# Patient Record
Sex: FEMALE | Race: OTHER | HISPANIC OR LATINO
[De-identification: names, ages, dates, MRNs, and addresses within clinical notes are randomized per-mention and may not be internally consistent; named-entity substitution may affect disease eponyms.]

---

## 2017-01-09 ENCOUNTER — APPOINTMENT (OUTPATIENT)
Dept: OBGYN | Facility: CLINIC | Age: 32
End: 2017-01-09

## 2017-01-09 VITALS
WEIGHT: 180 LBS | HEIGHT: 64 IN | SYSTOLIC BLOOD PRESSURE: 100 MMHG | BODY MASS INDEX: 30.73 KG/M2 | DIASTOLIC BLOOD PRESSURE: 60 MMHG

## 2017-01-11 ENCOUNTER — RESULT REVIEW (OUTPATIENT)
Age: 32
End: 2017-01-11

## 2017-01-12 ENCOUNTER — RECORD ABSTRACTING (OUTPATIENT)
Age: 32
End: 2017-01-12

## 2017-01-12 DIAGNOSIS — Z82.49 FAMILY HISTORY OF ISCHEMIC HEART DISEASE AND OTHER DISEASES OF THE CIRCULATORY SYSTEM: ICD-10-CM

## 2017-01-12 DIAGNOSIS — N83.209 UNSPECIFIED OVARIAN CYST, UNSPECIFIED SIDE: ICD-10-CM

## 2017-01-12 DIAGNOSIS — Z80.49 FAMILY HISTORY OF MALIGNANT NEOPLASM OF OTHER GENITAL ORGANS: ICD-10-CM

## 2017-01-12 DIAGNOSIS — Z12.4 ENCOUNTER FOR SCREENING FOR MALIGNANT NEOPLASM OF CERVIX: ICD-10-CM

## 2017-01-19 LAB
HPV I/H RISK 1 DNA CVX QL PROBE+SIG AMP: DETECTED
HPV16 DNA CVX QL PROBE+SIG AMP: NOT DETECTED
HPV16 DNA CVX QL PROBE+SIG AMP: NOT DETECTED

## 2017-04-19 ENCOUNTER — APPOINTMENT (OUTPATIENT)
Dept: UROGYNECOLOGY | Facility: CLINIC | Age: 32
End: 2017-04-19

## 2017-04-19 ENCOUNTER — RESULT REVIEW (OUTPATIENT)
Age: 32
End: 2017-04-19

## 2017-04-19 VITALS
HEIGHT: 64 IN | BODY MASS INDEX: 31.07 KG/M2 | DIASTOLIC BLOOD PRESSURE: 70 MMHG | SYSTOLIC BLOOD PRESSURE: 102 MMHG | WEIGHT: 182 LBS

## 2017-04-19 DIAGNOSIS — N30.30 TRIGONITIS W/OUT HEMATURIA: ICD-10-CM

## 2017-04-19 LAB — CYTOLOGY CVX/VAG DOC THIN PREP: NORMAL

## 2017-04-21 LAB
APPEARANCE UR: CLEAR
BACTERIA UR CULT: NORMAL
BILIRUB UR QL STRIP: NEGATIVE
COLOR UR: YELLOW
GLUCOSE UR STRIP-MCNC: NEGATIVE MG/DL
HGB UR QL STRIP: ABNORMAL
KETONES UR STRIP-MCNC: NEGATIVE MG/DL
NITRITE UR QL STRIP: NEGATIVE
PH UR STRIP: 6
PROT UR STRIP-MCNC: ABNORMAL MG/DL
RBC #/AREA URNS HPF: ABNORMAL P/HPF
SP GR UR STRIP: >= 1.03
URINE COMP/EPITH (NORTH): ABNORMAL
UROBILINOGEN UR STRIP-MCNC: 1 MG/DL
WBC URNS QL MICRO: ABNORMAL
WBC URNS QL MICRO: ABNORMAL P/HPF

## 2017-05-05 LAB
MYCOPLAS+UREOPLAS SPEC CULT: NORMAL
SPECIMEN SOURCE: NORMAL

## 2017-05-10 ENCOUNTER — OTHER (OUTPATIENT)
Age: 32
End: 2017-05-10

## 2017-06-12 ENCOUNTER — APPOINTMENT (OUTPATIENT)
Dept: OBGYN | Facility: CLINIC | Age: 32
End: 2017-06-12

## 2017-06-12 ENCOUNTER — OUTPATIENT (OUTPATIENT)
Dept: OUTPATIENT SERVICES | Facility: HOSPITAL | Age: 32
LOS: 1 days | Discharge: HOME | End: 2017-06-12

## 2017-06-12 ENCOUNTER — RESULT CHARGE (OUTPATIENT)
Age: 32
End: 2017-06-12

## 2017-06-12 VITALS
SYSTOLIC BLOOD PRESSURE: 110 MMHG | WEIGHT: 182 LBS | BODY MASS INDEX: 31.07 KG/M2 | DIASTOLIC BLOOD PRESSURE: 60 MMHG | HEIGHT: 64 IN

## 2017-06-12 LAB
HCG UR QL: NEGATIVE
QUALITY CONTROL: YES

## 2017-06-14 ENCOUNTER — RESULT REVIEW (OUTPATIENT)
Age: 32
End: 2017-06-14

## 2017-06-26 ENCOUNTER — APPOINTMENT (OUTPATIENT)
Dept: OBGYN | Facility: CLINIC | Age: 32
End: 2017-06-26

## 2017-06-26 ENCOUNTER — OUTPATIENT (OUTPATIENT)
Dept: OUTPATIENT SERVICES | Facility: HOSPITAL | Age: 32
LOS: 1 days | Discharge: HOME | End: 2017-06-26

## 2017-06-28 DIAGNOSIS — N39.46 MIXED INCONTINENCE: ICD-10-CM

## 2017-06-28 DIAGNOSIS — N30.30 TRIGONITIS WITHOUT HEMATURIA: ICD-10-CM

## 2017-06-29 ENCOUNTER — OTHER (OUTPATIENT)
Age: 32
End: 2017-06-29

## 2017-08-15 ENCOUNTER — RESULT REVIEW (OUTPATIENT)
Age: 32
End: 2017-08-15

## 2017-08-15 ENCOUNTER — OUTPATIENT (OUTPATIENT)
Dept: OUTPATIENT SERVICES | Facility: HOSPITAL | Age: 32
LOS: 1 days | Discharge: HOME | End: 2017-08-15

## 2017-08-15 ENCOUNTER — APPOINTMENT (OUTPATIENT)
Dept: INTERNAL MEDICINE | Facility: CLINIC | Age: 32
End: 2017-08-15

## 2017-08-15 VITALS
WEIGHT: 182 LBS | SYSTOLIC BLOOD PRESSURE: 114 MMHG | BODY MASS INDEX: 31.07 KG/M2 | DIASTOLIC BLOOD PRESSURE: 80 MMHG | HEART RATE: 97 BPM | HEIGHT: 64 IN

## 2017-08-15 DIAGNOSIS — Z87.898 PERSONAL HISTORY OF OTHER SPECIFIED CONDITIONS: ICD-10-CM

## 2017-08-15 DIAGNOSIS — R05 COUGH: ICD-10-CM

## 2017-08-15 DIAGNOSIS — R31.29 OTHER MICROSCOPIC HEMATURIA: ICD-10-CM

## 2017-08-17 ENCOUNTER — APPOINTMENT (OUTPATIENT)
Dept: UROGYNECOLOGY | Facility: CLINIC | Age: 32
End: 2017-08-17

## 2017-08-25 LAB
ANION GAP SERPL CALC-SCNC: 7 MEQ/L
BASOPHILS # BLD: 0.02 TH/MM3
BASOPHILS NFR BLD: 0.3 %
BUN SERPL-MCNC: 12 MG/DL
BUN/CREAT SERPL: 21.1 %
CALCIUM SERPL-MCNC: 9.2 MG/DL
CHLORIDE SERPL-SCNC: 108 MEQ/L
CHOLEST SERPL-MCNC: 174 MG/DL
CO2 SERPL-SCNC: 24 MEQ/L
CREAT SERPL-MCNC: 0.57 MG/DL
DIFFERENTIAL METHOD BLD: NORMAL
EOSINOPHIL # BLD: 0.3 TH/MM3
EOSINOPHIL NFR BLD: 3.9 %
ERYTHROCYTE [DISTWIDTH] IN BLOOD BY AUTOMATED COUNT: 13.4 %
ESTIMATED AVERGAGE GLUCOSE (NORTH): 117 MG/DL
GFR SERPL CREATININE-BSD FRML MDRD: 124
GLUCOSE SERPL-MCNC: 93 MG/DL
GRANULOCYTES # BLD: 4.84 TH/MM3
GRANULOCYTES NFR BLD: 62.4 %
HBA1C MFR BLD: 5.7 %
HCT VFR BLD AUTO: 38.1 %
HDLC SERPL-MCNC: 62 MG/DL
HDLC SERPL: 2.81
HGB BLD-MCNC: 12.7 G/DL
IMM GRANULOCYTES # BLD: 0.02 TH/MM3
IMM GRANULOCYTES NFR BLD: 0.3 %
LDLC SERPL DIRECT ASSAY-MCNC: 106 MG/DL
LYMPHOCYTES # BLD: 1.96 TH/MM3
LYMPHOCYTES NFR BLD: 25.3 %
MCH RBC QN AUTO: 29.6 PG
MCHC RBC AUTO-ENTMCNC: 33.3 G/DL
MCV RBC AUTO: 88.8 FL
MONOCYTES # BLD: 0.6 TH/MM3
MONOCYTES NFR BLD: 7.8 %
PLATELET # BLD: 300 TH/MM3
PMV BLD AUTO: 11.5 FL
POTASSIUM SERPL-SCNC: 4.4 MMOL/L
RBC # BLD AUTO: 4.29 MIL/MM3
SODIUM SERPL-SCNC: 139 MEQ/L
TRIGL SERPL-MCNC: 109 MG/DL
VLDLC SERPL-MCNC: 21 MG/DL
WBC # BLD: 7.74 TH/MM3

## 2017-09-26 ENCOUNTER — APPOINTMENT (OUTPATIENT)
Dept: INTERNAL MEDICINE | Facility: CLINIC | Age: 32
End: 2017-09-26

## 2017-09-29 ENCOUNTER — OUTPATIENT (OUTPATIENT)
Dept: OUTPATIENT SERVICES | Facility: HOSPITAL | Age: 32
LOS: 1 days | Discharge: HOME | End: 2017-09-29

## 2017-09-29 ENCOUNTER — APPOINTMENT (OUTPATIENT)
Dept: INTERNAL MEDICINE | Facility: CLINIC | Age: 32
End: 2017-09-29

## 2017-09-29 VITALS
BODY MASS INDEX: 31.07 KG/M2 | SYSTOLIC BLOOD PRESSURE: 107 MMHG | HEART RATE: 73 BPM | WEIGHT: 182 LBS | DIASTOLIC BLOOD PRESSURE: 73 MMHG | HEIGHT: 64 IN

## 2017-09-29 DIAGNOSIS — R33.9 RETENTION OF URINE, UNSPECIFIED: ICD-10-CM

## 2017-09-29 DIAGNOSIS — N39.46 MIXED INCONTINENCE: ICD-10-CM

## 2017-09-29 RX ORDER — OXYBUTYNIN CHLORIDE 10 MG/1
10 TABLET, EXTENDED RELEASE ORAL
Qty: 90 | Refills: 0 | Status: DISCONTINUED | COMMUNITY
Start: 2017-04-19 | End: 2017-09-29

## 2017-09-29 RX ORDER — BUTALBITAL, ASPIRIN, AND CAFFEINE 325; 50; 40 MG/1; MG/1; MG/1
50-325-40 CAPSULE ORAL
Qty: 40 | Refills: 2 | Status: DISCONTINUED | COMMUNITY
Start: 2017-08-15 | End: 2017-09-29

## 2017-09-29 RX ORDER — OXYBUTYNIN CHLORIDE 10 MG/1
10 TABLET, EXTENDED RELEASE ORAL
Qty: 90 | Refills: 0 | Status: DISCONTINUED | COMMUNITY
Start: 2017-06-29 | End: 2017-09-29

## 2017-10-17 ENCOUNTER — OUTPATIENT (OUTPATIENT)
Dept: OUTPATIENT SERVICES | Facility: HOSPITAL | Age: 32
LOS: 1 days | Discharge: HOME | End: 2017-10-17

## 2017-10-27 ENCOUNTER — APPOINTMENT (OUTPATIENT)
Dept: INTERNAL MEDICINE | Facility: CLINIC | Age: 32
End: 2017-10-27

## 2017-11-07 ENCOUNTER — APPOINTMENT (OUTPATIENT)
Dept: INTERNAL MEDICINE | Facility: CLINIC | Age: 32
End: 2017-11-07

## 2017-11-07 ENCOUNTER — OUTPATIENT (OUTPATIENT)
Dept: OUTPATIENT SERVICES | Facility: HOSPITAL | Age: 32
LOS: 1 days | Discharge: HOME | End: 2017-11-07

## 2017-11-07 VITALS
HEART RATE: 66 BPM | SYSTOLIC BLOOD PRESSURE: 137 MMHG | DIASTOLIC BLOOD PRESSURE: 82 MMHG | TEMPERATURE: 95.8 F | HEIGHT: 64 IN | WEIGHT: 175.38 LBS | BODY MASS INDEX: 29.94 KG/M2

## 2017-11-07 RX ORDER — NORGESTIMATE AND ETHINYL ESTRADIOL 7DAYSX3 LO
0.18/0.215/0.25 KIT ORAL DAILY
Qty: 6 | Refills: 0 | Status: DISCONTINUED | COMMUNITY
Start: 2017-01-09 | End: 2017-11-07

## 2017-11-07 RX ORDER — MELOXICAM 15 MG/1
15 TABLET ORAL DAILY
Qty: 10 | Refills: 2 | Status: DISCONTINUED | COMMUNITY
Start: 2017-09-29 | End: 2017-11-07

## 2017-11-07 RX ORDER — NITROFURANTOIN (MONOHYDRATE/MACROCRYSTALS) 25; 75 MG/1; MG/1
100 CAPSULE ORAL
Qty: 10 | Refills: 0 | Status: DISCONTINUED | COMMUNITY
Start: 2017-09-29 | End: 2017-11-07

## 2017-12-01 ENCOUNTER — APPOINTMENT (OUTPATIENT)
Dept: SURGERY | Facility: CLINIC | Age: 32
End: 2017-12-01
Payer: COMMERCIAL

## 2017-12-01 VITALS
BODY MASS INDEX: 29.37 KG/M2 | SYSTOLIC BLOOD PRESSURE: 116 MMHG | HEIGHT: 64 IN | DIASTOLIC BLOOD PRESSURE: 70 MMHG | WEIGHT: 172 LBS

## 2017-12-01 DIAGNOSIS — K43.9 VENTRAL HERNIA W/OUT OBSTRUCTION OR GANGRENE: ICD-10-CM

## 2017-12-01 PROCEDURE — 99204 OFFICE O/P NEW MOD 45 MIN: CPT

## 2017-12-02 ENCOUNTER — APPOINTMENT (OUTPATIENT)
Dept: INTERNAL MEDICINE | Facility: CLINIC | Age: 32
End: 2017-12-02

## 2017-12-02 ENCOUNTER — OUTPATIENT (OUTPATIENT)
Dept: OUTPATIENT SERVICES | Facility: HOSPITAL | Age: 32
LOS: 1 days | Discharge: HOME | End: 2017-12-02

## 2017-12-02 ENCOUNTER — RESULT REVIEW (OUTPATIENT)
Age: 32
End: 2017-12-02

## 2017-12-02 VITALS
WEIGHT: 172 LBS | HEIGHT: 64 IN | BODY MASS INDEX: 29.37 KG/M2 | DIASTOLIC BLOOD PRESSURE: 75 MMHG | HEART RATE: 65 BPM | SYSTOLIC BLOOD PRESSURE: 110 MMHG

## 2017-12-02 DIAGNOSIS — J15.9 UNSPECIFIED BACTERIAL PNEUMONIA: ICD-10-CM

## 2017-12-08 ENCOUNTER — OUTPATIENT (OUTPATIENT)
Dept: OUTPATIENT SERVICES | Facility: HOSPITAL | Age: 32
LOS: 1 days | Discharge: HOME | End: 2017-12-08

## 2017-12-08 DIAGNOSIS — K43.9 VENTRAL HERNIA WITHOUT OBSTRUCTION OR GANGRENE: ICD-10-CM

## 2017-12-14 LAB
ANION GAP SERPL CALC-SCNC: 8 MEQ/L
BUN SERPL-MCNC: 7 MG/DL
BUN/CREAT SERPL: 13.5 %
CALCIUM SERPL-MCNC: 9.2 MG/DL
CHLORIDE SERPL-SCNC: 104 MEQ/L
CHOLEST SERPL-MCNC: 161 MG/DL
CO2 SERPL-SCNC: 25 MEQ/L
CREAT SERPL-MCNC: 0.52 MG/DL
ESTIMATED AVERGAGE GLUCOSE (NORTH): 117 MG/DL
GFR SERPL CREATININE-BSD FRML MDRD: 138
GLUCOSE SERPL-MCNC: 81 MG/DL
HBA1C MFR BLD: 5.7 %
HDLC SERPL-MCNC: 50 MG/DL
HDLC SERPL: 3.22
LDLC SERPL DIRECT ASSAY-MCNC: 85 MG/DL
POTASSIUM SERPL-SCNC: 3.9 MMOL/L
SODIUM SERPL-SCNC: 137 MEQ/L
TRIGL SERPL-MCNC: 146 MG/DL
VLDLC SERPL-MCNC: 29 MG/DL

## 2017-12-15 ENCOUNTER — APPOINTMENT (OUTPATIENT)
Dept: SURGERY | Facility: CLINIC | Age: 32
End: 2017-12-15

## 2017-12-20 ENCOUNTER — APPOINTMENT (OUTPATIENT)
Dept: SURGERY | Facility: CLINIC | Age: 32
End: 2017-12-20
Payer: COMMERCIAL

## 2017-12-20 VITALS
SYSTOLIC BLOOD PRESSURE: 118 MMHG | DIASTOLIC BLOOD PRESSURE: 76 MMHG | BODY MASS INDEX: 29.02 KG/M2 | WEIGHT: 170 LBS | HEIGHT: 64 IN

## 2017-12-20 PROCEDURE — 99214 OFFICE O/P EST MOD 30 MIN: CPT

## 2018-01-05 ENCOUNTER — EMERGENCY (EMERGENCY)
Facility: HOSPITAL | Age: 33
LOS: 0 days | Discharge: HOME | End: 2018-01-05
Admitting: INTERNAL MEDICINE

## 2018-01-05 DIAGNOSIS — R10.33 PERIUMBILICAL PAIN: ICD-10-CM

## 2018-01-05 DIAGNOSIS — Z94.5 SKIN TRANSPLANT STATUS: ICD-10-CM

## 2018-01-05 DIAGNOSIS — N39.0 URINARY TRACT INFECTION, SITE NOT SPECIFIED: ICD-10-CM

## 2018-01-05 DIAGNOSIS — N83.202 UNSPECIFIED OVARIAN CYST, LEFT SIDE: ICD-10-CM

## 2018-01-05 DIAGNOSIS — N83.201 UNSPECIFIED OVARIAN CYST, RIGHT SIDE: ICD-10-CM

## 2018-01-05 DIAGNOSIS — K86.9 DISEASE OF PANCREAS, UNSPECIFIED: ICD-10-CM

## 2018-01-05 DIAGNOSIS — R94.5 ABNORMAL RESULTS OF LIVER FUNCTION STUDIES: ICD-10-CM

## 2018-01-12 ENCOUNTER — OUTPATIENT (OUTPATIENT)
Dept: OUTPATIENT SERVICES | Facility: HOSPITAL | Age: 33
LOS: 1 days | Discharge: HOME | End: 2018-01-12

## 2018-01-12 ENCOUNTER — APPOINTMENT (OUTPATIENT)
Dept: SURGERY | Facility: CLINIC | Age: 33
End: 2018-01-12
Payer: COMMERCIAL

## 2018-01-12 ENCOUNTER — RESULT REVIEW (OUTPATIENT)
Age: 33
End: 2018-01-12

## 2018-01-12 VITALS
HEIGHT: 64 IN | SYSTOLIC BLOOD PRESSURE: 120 MMHG | WEIGHT: 167 LBS | BODY MASS INDEX: 28.51 KG/M2 | DIASTOLIC BLOOD PRESSURE: 78 MMHG

## 2018-01-12 DIAGNOSIS — K86.9 DISEASE OF PANCREAS, UNSPECIFIED: ICD-10-CM

## 2018-01-12 PROCEDURE — 99215 OFFICE O/P EST HI 40 MIN: CPT

## 2018-01-17 ENCOUNTER — OUTPATIENT (OUTPATIENT)
Dept: OUTPATIENT SERVICES | Facility: HOSPITAL | Age: 33
LOS: 1 days | Discharge: HOME | End: 2018-01-17

## 2018-01-19 LAB
ALBUMIN SERPL-MCNC: 3.8 G/DL
ALP SERPL-CCNC: 138 IU/L
ALT SERPL-CCNC: 73 IU/L
AMYLASE SERPL-CCNC: 108 U/L
AST SERPL-CCNC: 45 IU/L
BILIRUB DIRECT SERPL-MCNC: < 0.1 MG/DL
BILIRUB SERPL-MCNC: 0.5 MG/DL
CANCER AG19-9 SERPL-ACNC: 15.3 U/ML
CEA SERPL-MCNC: 1.3 NG/ML
CRP SERPL-MCNC: 0.7 MG/DL
IGG FLD-MCNC: 1400 MG/DL
PROT SERPL-MCNC: 7.1 G/DL

## 2018-01-23 ENCOUNTER — APPOINTMENT (OUTPATIENT)
Dept: SURGERY | Facility: CLINIC | Age: 33
End: 2018-01-23
Payer: COMMERCIAL

## 2018-01-23 PROCEDURE — 99215 OFFICE O/P EST HI 40 MIN: CPT

## 2018-01-29 ENCOUNTER — APPOINTMENT (OUTPATIENT)
Dept: OBGYN | Facility: CLINIC | Age: 33
End: 2018-01-29

## 2018-01-29 DIAGNOSIS — R19.07 GENERALIZED INTRA-ABDOMINAL AND PELVIC SWELLING, MASS AND LUMP: ICD-10-CM

## 2018-02-01 ENCOUNTER — OUTPATIENT (OUTPATIENT)
Dept: OUTPATIENT SERVICES | Facility: HOSPITAL | Age: 33
LOS: 1 days | Discharge: HOME | End: 2018-02-01

## 2018-02-01 DIAGNOSIS — K86.9 DISEASE OF PANCREAS, UNSPECIFIED: ICD-10-CM

## 2018-02-05 ENCOUNTER — RESULT REVIEW (OUTPATIENT)
Age: 33
End: 2018-02-05

## 2018-02-05 ENCOUNTER — OUTPATIENT (OUTPATIENT)
Dept: OUTPATIENT SERVICES | Facility: HOSPITAL | Age: 33
LOS: 1 days | Discharge: HOME | End: 2018-02-05

## 2018-02-05 VITALS
OXYGEN SATURATION: 100 % | SYSTOLIC BLOOD PRESSURE: 109 MMHG | HEART RATE: 57 BPM | DIASTOLIC BLOOD PRESSURE: 64 MMHG | RESPIRATION RATE: 18 BRPM

## 2018-02-05 VITALS
TEMPERATURE: 98 F | WEIGHT: 163.14 LBS | RESPIRATION RATE: 20 BRPM | SYSTOLIC BLOOD PRESSURE: 95 MMHG | DIASTOLIC BLOOD PRESSURE: 80 MMHG | HEIGHT: 64.96 IN | HEART RATE: 63 BPM

## 2018-02-05 RX ORDER — MORPHINE SULFATE 50 MG/1
2 CAPSULE, EXTENDED RELEASE ORAL ONCE
Qty: 0 | Refills: 0 | Status: DISCONTINUED | OUTPATIENT
Start: 2018-02-05 | End: 2018-02-05

## 2018-02-05 RX ADMIN — MORPHINE SULFATE 2 MILLIGRAM(S): 50 CAPSULE, EXTENDED RELEASE ORAL at 16:12

## 2018-02-05 NOTE — ASU DISCHARGE PLAN (ADULT/PEDIATRIC). - NOTIFY
Fever greater than 101/Pain not relieved by Medications/Persistent Nausea and Vomiting/Bleeding that does not stop/Inability to Tolerate Liquids or Foods/Numbness, color, or temperature change to extremity

## 2018-02-07 ENCOUNTER — APPOINTMENT (OUTPATIENT)
Dept: SURGERY | Facility: CLINIC | Age: 33
End: 2018-02-07
Payer: SUBSIDIZED

## 2018-02-07 ENCOUNTER — OUTPATIENT (OUTPATIENT)
Dept: OUTPATIENT SERVICES | Facility: HOSPITAL | Age: 33
LOS: 1 days | Discharge: HOME | End: 2018-02-07

## 2018-02-07 VITALS
HEIGHT: 65 IN | HEART RATE: 72 BPM | WEIGHT: 165.35 LBS | SYSTOLIC BLOOD PRESSURE: 118 MMHG | DIASTOLIC BLOOD PRESSURE: 78 MMHG | RESPIRATION RATE: 16 BRPM | TEMPERATURE: 98 F

## 2018-02-07 VITALS
SYSTOLIC BLOOD PRESSURE: 120 MMHG | DIASTOLIC BLOOD PRESSURE: 74 MMHG | HEIGHT: 64 IN | WEIGHT: 165 LBS | BODY MASS INDEX: 28.17 KG/M2

## 2018-02-07 DIAGNOSIS — K86.9 DISEASE OF PANCREAS, UNSPECIFIED: ICD-10-CM

## 2018-02-07 DIAGNOSIS — K29.60 OTHER GASTRITIS WITHOUT BLEEDING: ICD-10-CM

## 2018-02-07 DIAGNOSIS — Z01.818 ENCOUNTER FOR OTHER PREPROCEDURAL EXAMINATION: ICD-10-CM

## 2018-02-07 DIAGNOSIS — K86.89 OTHER SPECIFIED DISEASES OF PANCREAS: ICD-10-CM

## 2018-02-07 LAB
ALBUMIN SERPL ELPH-MCNC: 4.1 G/DL — SIGNIFICANT CHANGE UP (ref 3–5.5)
ALP SERPL-CCNC: 104 U/L — SIGNIFICANT CHANGE UP (ref 30–115)
ALT FLD-CCNC: 142 U/L — HIGH (ref 0–41)
AMYLASE P1 CFR SERPL: 89 U/L — SIGNIFICANT CHANGE UP (ref 25–115)
ANION GAP SERPL CALC-SCNC: 9 MMOL/L — SIGNIFICANT CHANGE UP (ref 7–14)
APTT BLD: 30.6 SEC — SIGNIFICANT CHANGE UP (ref 27–39.2)
AST SERPL-CCNC: 66 U/L — HIGH (ref 0–41)
BASOPHILS # BLD AUTO: 0.01 K/UL — SIGNIFICANT CHANGE UP (ref 0–0.2)
BASOPHILS NFR BLD AUTO: 0.2 % — SIGNIFICANT CHANGE UP (ref 0–1)
BILIRUB SERPL-MCNC: 0.6 MG/DL — SIGNIFICANT CHANGE UP (ref 0.2–1.2)
BLD GP AB SCN SERPL QL: SIGNIFICANT CHANGE UP
BUN SERPL-MCNC: 8 MG/DL — LOW (ref 10–20)
CALCIUM SERPL-MCNC: 9.4 MG/DL — SIGNIFICANT CHANGE UP (ref 8.5–10.1)
CHLORIDE SERPL-SCNC: 101 MMOL/L — SIGNIFICANT CHANGE UP (ref 98–110)
CO2 SERPL-SCNC: 25 MMOL/L — SIGNIFICANT CHANGE UP (ref 17–32)
CREAT SERPL-MCNC: 0.4 MG/DL — LOW (ref 0.7–1.5)
EOSINOPHIL # BLD AUTO: 0.21 K/UL — SIGNIFICANT CHANGE UP (ref 0–0.7)
EOSINOPHIL NFR BLD AUTO: 3.2 % — SIGNIFICANT CHANGE UP (ref 0–8)
GLUCOSE SERPL-MCNC: 76 MG/DL — SIGNIFICANT CHANGE UP (ref 70–110)
HCT VFR BLD CALC: 36.7 % — LOW (ref 37–47)
HGB BLD-MCNC: 12.1 G/DL — LOW (ref 14–18)
IMM GRANULOCYTES NFR BLD AUTO: 0.3 % — SIGNIFICANT CHANGE UP (ref 0.1–0.3)
INR BLD: 1.09 RATIO — SIGNIFICANT CHANGE UP (ref 0.65–1.3)
LIDOCAIN IGE QN: 24 U/L — SIGNIFICANT CHANGE UP (ref 7–60)
LYMPHOCYTES # BLD AUTO: 2.2 K/UL — SIGNIFICANT CHANGE UP (ref 1.2–3.4)
LYMPHOCYTES # BLD AUTO: 33.2 % — SIGNIFICANT CHANGE UP (ref 20.5–51.1)
MCHC RBC-ENTMCNC: 28.7 PG — SIGNIFICANT CHANGE UP (ref 27–31)
MCHC RBC-ENTMCNC: 33 G/DL — SIGNIFICANT CHANGE UP (ref 32–37)
MCV RBC AUTO: 87.2 FL — SIGNIFICANT CHANGE UP (ref 81–91)
MONOCYTES # BLD AUTO: 0.57 K/UL — SIGNIFICANT CHANGE UP (ref 0.1–0.6)
MONOCYTES NFR BLD AUTO: 8.6 % — SIGNIFICANT CHANGE UP (ref 1.7–9.3)
NEUTROPHILS # BLD AUTO: 3.61 K/UL — SIGNIFICANT CHANGE UP (ref 1.4–6.5)
NEUTROPHILS NFR BLD AUTO: 54.5 % — SIGNIFICANT CHANGE UP (ref 42.2–75.2)
NRBC # BLD: 0 /100 WBCS — SIGNIFICANT CHANGE UP (ref 0–0)
PLATELET # BLD AUTO: 311 K/UL — SIGNIFICANT CHANGE UP (ref 130–400)
POTASSIUM SERPL-MCNC: 4.5 MMOL/L — SIGNIFICANT CHANGE UP (ref 3.5–5)
POTASSIUM SERPL-SCNC: 4.5 MMOL/L — SIGNIFICANT CHANGE UP (ref 3.5–5)
PROT SERPL-MCNC: 7.1 G/DL — SIGNIFICANT CHANGE UP (ref 6–8)
PROTHROM AB SERPL-ACNC: 11.8 SEC — SIGNIFICANT CHANGE UP (ref 9.95–12.87)
RBC # BLD: 4.21 M/UL — SIGNIFICANT CHANGE UP (ref 4.2–5.4)
RBC # FLD: 13.3 % — SIGNIFICANT CHANGE UP (ref 11.5–14.5)
SODIUM SERPL-SCNC: 135 MMOL/L — SIGNIFICANT CHANGE UP (ref 135–146)
SURGICAL PATHOLOGY STUDY: SIGNIFICANT CHANGE UP
TYPE + AB SCN PNL BLD: SIGNIFICANT CHANGE UP
WBC # BLD: 6.62 K/UL — SIGNIFICANT CHANGE UP (ref 4.8–10.8)
WBC # FLD AUTO: 6.62 K/UL — SIGNIFICANT CHANGE UP (ref 4.8–10.8)

## 2018-02-07 PROCEDURE — 99215 OFFICE O/P EST HI 40 MIN: CPT

## 2018-02-07 NOTE — H&P PST ADULT - HISTORY OF PRESENT ILLNESS
PT TO PAST WITH C/O MASS TO PANCREAS PT NOW FOR SCHEDULED PROCEDURE. PT DENIES ANY CP SOB PALP COUGH DYSURIA FEVER URI. PT ABLE TO MOHINDER 1-2 FOS W/O SOB

## 2018-02-07 NOTE — H&P PST ADULT - FAMILY HISTORY
Mother  Still living? Yes, Estimated age: Age Unknown  Maternal family history of cancer, Age at diagnosis: Age Unknown  Diabetes, Age at diagnosis: Age Unknown     Father  Still living? Unknown  Diabetes, Age at diagnosis: Age Unknown

## 2018-02-07 NOTE — H&P PST ADULT - SOURCE OF INFORMATION, PROFILE
patient/sister Catie assisted with interpretation, pt refused nestorice prefers sister- understands some english/family

## 2018-02-08 DIAGNOSIS — B96.81 HELICOBACTER PYLORI [H. PYLORI] AS THE CAUSE OF DISEASES CLASSIFIED ELSEWHERE: ICD-10-CM

## 2018-02-08 DIAGNOSIS — K29.40 CHRONIC ATROPHIC GASTRITIS WITHOUT BLEEDING: ICD-10-CM

## 2018-02-09 LAB — NON-GYNECOLOGICAL CYTOLOGY STUDY: SIGNIFICANT CHANGE UP

## 2018-02-14 LAB — NON-GYNECOLOGICAL CYTOLOGY STUDY: SIGNIFICANT CHANGE UP

## 2018-02-20 ENCOUNTER — INPATIENT (INPATIENT)
Facility: HOSPITAL | Age: 33
LOS: 7 days | Discharge: HOME HEALTH | End: 2018-02-28
Attending: SURGERY

## 2018-02-20 ENCOUNTER — RESULT REVIEW (OUTPATIENT)
Age: 33
End: 2018-02-20

## 2018-02-20 VITALS
HEART RATE: 74 BPM | SYSTOLIC BLOOD PRESSURE: 101 MMHG | RESPIRATION RATE: 18 BRPM | TEMPERATURE: 98 F | HEIGHT: 65 IN | DIASTOLIC BLOOD PRESSURE: 84 MMHG | WEIGHT: 162.04 LBS

## 2018-02-20 LAB
ABO RH CONFIRMATION: SIGNIFICANT CHANGE UP
ALBUMIN SERPL ELPH-MCNC: 3.1 G/DL — SIGNIFICANT CHANGE UP (ref 3–5.5)
ALP SERPL-CCNC: 77 U/L — SIGNIFICANT CHANGE UP (ref 30–115)
ALT FLD-CCNC: 183 U/L — HIGH (ref 0–41)
ANION GAP SERPL CALC-SCNC: 9 MMOL/L — SIGNIFICANT CHANGE UP (ref 7–14)
APTT BLD: 25.7 SEC — LOW (ref 27–39.2)
AST SERPL-CCNC: 168 U/L — HIGH (ref 0–41)
BILIRUB DIRECT SERPL-MCNC: 1.1 MG/DL — HIGH (ref 0–0.2)
BILIRUB INDIRECT FLD-MCNC: 0.8 MG/DL — SIGNIFICANT CHANGE UP
BILIRUB SERPL-MCNC: 1.9 MG/DL — HIGH (ref 0.2–1.2)
BUN SERPL-MCNC: 5 MG/DL — LOW (ref 10–20)
CALCIUM SERPL-MCNC: 7.7 MG/DL — LOW (ref 8.5–10.1)
CHLORIDE SERPL-SCNC: 106 MMOL/L — SIGNIFICANT CHANGE UP (ref 98–110)
CO2 SERPL-SCNC: 21 MMOL/L — SIGNIFICANT CHANGE UP (ref 17–32)
CREAT SERPL-MCNC: 0.6 MG/DL — LOW (ref 0.7–1.5)
GLUCOSE SERPL-MCNC: 166 MG/DL — HIGH (ref 70–110)
HCT VFR BLD CALC: 34.3 % — LOW (ref 37–47)
HGB BLD-MCNC: 11.8 G/DL — LOW (ref 14–18)
INR BLD: 1.12 RATIO — SIGNIFICANT CHANGE UP (ref 0.65–1.3)
MCHC RBC-ENTMCNC: 29.8 PG — SIGNIFICANT CHANGE UP (ref 27–31)
MCHC RBC-ENTMCNC: 34.4 G/DL — SIGNIFICANT CHANGE UP (ref 32–37)
MCV RBC AUTO: 86.6 FL — SIGNIFICANT CHANGE UP (ref 81–91)
NRBC # BLD: 0 /100 WBCS — SIGNIFICANT CHANGE UP (ref 0–0)
PLATELET # BLD AUTO: 269 K/UL — SIGNIFICANT CHANGE UP (ref 130–400)
POTASSIUM SERPL-MCNC: 4.1 MMOL/L — SIGNIFICANT CHANGE UP (ref 3.5–5)
POTASSIUM SERPL-SCNC: 4.1 MMOL/L — SIGNIFICANT CHANGE UP (ref 3.5–5)
PROT SERPL-MCNC: 5.7 G/DL — LOW (ref 6–8)
PROTHROM AB SERPL-ACNC: 12.1 SEC — SIGNIFICANT CHANGE UP (ref 9.95–12.87)
RBC # BLD: 3.96 M/UL — LOW (ref 4.2–5.4)
RBC # FLD: 13.5 % — SIGNIFICANT CHANGE UP (ref 11.5–14.5)
SODIUM SERPL-SCNC: 136 MMOL/L — SIGNIFICANT CHANGE UP (ref 135–146)
WBC # BLD: 16.5 K/UL — HIGH (ref 4.8–10.8)
WBC # FLD AUTO: 16.5 K/UL — HIGH (ref 4.8–10.8)

## 2018-02-20 RX ORDER — MORPHINE SULFATE 50 MG/1
4 CAPSULE, EXTENDED RELEASE ORAL
Qty: 0 | Refills: 0 | Status: ON HOLD | OUTPATIENT
Start: 2018-02-20

## 2018-02-20 RX ORDER — PANTOPRAZOLE SODIUM 20 MG/1
40 TABLET, DELAYED RELEASE ORAL EVERY 24 HOURS
Qty: 0 | Refills: 0 | Status: DISCONTINUED | OUTPATIENT
Start: 2018-02-20 | End: 2018-02-28

## 2018-02-20 RX ORDER — MORPHINE SULFATE 50 MG/1
2 CAPSULE, EXTENDED RELEASE ORAL EVERY 4 HOURS
Qty: 0 | Refills: 0 | Status: DISCONTINUED | OUTPATIENT
Start: 2018-02-20 | End: 2018-02-21

## 2018-02-20 RX ORDER — SODIUM CHLORIDE 9 MG/ML
1000 INJECTION, SOLUTION INTRAVENOUS
Qty: 0 | Refills: 0 | Status: DISCONTINUED | OUTPATIENT
Start: 2018-02-20 | End: 2018-02-22

## 2018-02-20 RX ORDER — ACETAMINOPHEN 500 MG
1000 TABLET ORAL ONCE
Qty: 0 | Refills: 0 | Status: COMPLETED | OUTPATIENT
Start: 2018-02-20 | End: 2018-02-21

## 2018-02-20 RX ORDER — MORPHINE SULFATE 50 MG/1
4 CAPSULE, EXTENDED RELEASE ORAL ONCE
Qty: 0 | Refills: 0 | Status: DISCONTINUED | OUTPATIENT
Start: 2018-02-20 | End: 2018-02-20

## 2018-02-20 RX ORDER — ACETAMINOPHEN 500 MG
1000 TABLET ORAL ONCE
Qty: 0 | Refills: 0 | Status: COMPLETED | OUTPATIENT
Start: 2018-02-20 | End: 2018-02-20

## 2018-02-20 RX ORDER — HEPARIN SODIUM 5000 [USP'U]/ML
5000 INJECTION INTRAVENOUS; SUBCUTANEOUS EVERY 8 HOURS
Qty: 0 | Refills: 0 | Status: DISCONTINUED | OUTPATIENT
Start: 2018-02-20 | End: 2018-02-20

## 2018-02-20 RX ORDER — ONDANSETRON 8 MG/1
4 TABLET, FILM COATED ORAL EVERY 4 HOURS
Qty: 0 | Refills: 0 | Status: DISCONTINUED | OUTPATIENT
Start: 2018-02-20 | End: 2018-02-28

## 2018-02-20 RX ORDER — ONDANSETRON 8 MG/1
4 TABLET, FILM COATED ORAL ONCE
Qty: 0 | Refills: 0 | Status: ON HOLD | OUTPATIENT
Start: 2018-02-20

## 2018-02-20 RX ORDER — PANTOPRAZOLE SODIUM 20 MG/1
40 TABLET, DELAYED RELEASE ORAL ONCE
Qty: 0 | Refills: 0 | Status: DISCONTINUED | OUTPATIENT
Start: 2018-02-20 | End: 2018-02-20

## 2018-02-20 RX ORDER — ENOXAPARIN SODIUM 100 MG/ML
40 INJECTION SUBCUTANEOUS EVERY 24 HOURS
Qty: 0 | Refills: 0 | Status: DISCONTINUED | OUTPATIENT
Start: 2018-02-20 | End: 2018-02-28

## 2018-02-20 RX ORDER — MORPHINE SULFATE 50 MG/1
2 CAPSULE, EXTENDED RELEASE ORAL
Qty: 0 | Refills: 0 | Status: ON HOLD | OUTPATIENT
Start: 2018-02-20

## 2018-02-20 RX ORDER — HEPARIN SODIUM 5000 [USP'U]/ML
5000 INJECTION INTRAVENOUS; SUBCUTANEOUS ONCE
Qty: 0 | Refills: 0 | Status: DISCONTINUED | OUTPATIENT
Start: 2018-02-20 | End: 2018-02-20

## 2018-02-20 RX ADMIN — MORPHINE SULFATE 4 MILLIGRAM(S): 50 CAPSULE, EXTENDED RELEASE ORAL at 22:10

## 2018-02-20 RX ADMIN — Medication 400 MILLIGRAM(S): at 20:41

## 2018-02-20 NOTE — BRIEF OPERATIVE NOTE - PROCEDURE
<<-----Click on this checkbox to enter Procedure Pancreaticoduodenectomy, pylorus sparing  02/20/2018  With cholecystectomy  Active  TDINITTO Ventral hernia repair  02/21/2018    Active  IMUKHERJEE  Cholecystectomy  02/21/2018    Active  IMUKHERJEE  Pancreaticoduodenectomy, pylorus sparing  02/20/2018  With cholecystectomy  Active  Sushma Dickens

## 2018-02-20 NOTE — CHART NOTE - NSCHARTNOTEFT_GEN_A_CORE
PACU ANESTHESIA ADMISSION NOTE      Procedure:   Post op diagnosis:     ____ Intubated  TV:______       Rate: ______      FiO2: ______    _x___ Patent Airway    __x__ Full return of protective reflexes    ____ Full recovery from anesthesia / sedation to baseline status    Vitals:  HR 81  /61  RR 12  O2sat. 100%  Temp: 37.1      Mental Status:  ____ Awake   _____ Alert   __x___ Drowsy   _____ Sedated    Nausea/Vomiting: ____ Yes, See Post - Op Orders      __x_ No    Pain Scale (0-10): ___x__    Treatment: ____ None    ___x_ See Post - Op/PCA Orders    Post - Operative Fluids:   ____ Oral   __x__ See Post - Op Orders    Plan:  Discharge to:   ____Home       __x___Floor      _____Critical Care    _____ Other:_________________    Comments: Uneventful anesthesia. Pt's in PACU in stable condition.

## 2018-02-21 DIAGNOSIS — C80.1 MALIGNANT (PRIMARY) NEOPLASM, UNSPECIFIED: ICD-10-CM

## 2018-02-21 LAB
ALBUMIN SERPL ELPH-MCNC: 2.9 G/DL — LOW (ref 3–5.5)
ALP SERPL-CCNC: 68 U/L — SIGNIFICANT CHANGE UP (ref 30–115)
ALT FLD-CCNC: 152 U/L — HIGH (ref 0–41)
ANION GAP SERPL CALC-SCNC: 7 MMOL/L — SIGNIFICANT CHANGE UP (ref 7–14)
AST SERPL-CCNC: 125 U/L — HIGH (ref 0–41)
BASOPHILS # BLD AUTO: 0.02 K/UL — SIGNIFICANT CHANGE UP (ref 0–0.2)
BASOPHILS NFR BLD AUTO: 0.1 % — SIGNIFICANT CHANGE UP (ref 0–1)
BILIRUB DIRECT SERPL-MCNC: 0.5 MG/DL — HIGH (ref 0–0.2)
BILIRUB INDIRECT FLD-MCNC: 0.8 MG/DL — SIGNIFICANT CHANGE UP
BILIRUB SERPL-MCNC: 1.3 MG/DL — HIGH (ref 0.2–1.2)
BUN SERPL-MCNC: 5 MG/DL — LOW (ref 10–20)
CALCIUM SERPL-MCNC: 7.8 MG/DL — LOW (ref 8.5–10.1)
CHLORIDE SERPL-SCNC: 105 MMOL/L — SIGNIFICANT CHANGE UP (ref 98–110)
CO2 SERPL-SCNC: 23 MMOL/L — SIGNIFICANT CHANGE UP (ref 17–32)
CREAT SERPL-MCNC: 0.6 MG/DL — LOW (ref 0.7–1.5)
EOSINOPHIL # BLD AUTO: 0 K/UL — SIGNIFICANT CHANGE UP (ref 0–0.7)
EOSINOPHIL NFR BLD AUTO: 0 % — SIGNIFICANT CHANGE UP (ref 0–8)
GLUCOSE SERPL-MCNC: 138 MG/DL — HIGH (ref 70–110)
HCT VFR BLD CALC: 32.4 % — LOW (ref 37–47)
HGB BLD-MCNC: 10.9 G/DL — LOW (ref 14–18)
IMM GRANULOCYTES NFR BLD AUTO: 0.4 % — HIGH (ref 0.1–0.3)
LYMPHOCYTES # BLD AUTO: 1.09 K/UL — LOW (ref 1.2–3.4)
LYMPHOCYTES # BLD AUTO: 7.4 % — LOW (ref 20.5–51.1)
MCHC RBC-ENTMCNC: 29.3 PG — SIGNIFICANT CHANGE UP (ref 27–31)
MCHC RBC-ENTMCNC: 33.6 G/DL — SIGNIFICANT CHANGE UP (ref 32–37)
MCV RBC AUTO: 87.1 FL — SIGNIFICANT CHANGE UP (ref 81–91)
MONOCYTES # BLD AUTO: 1.68 K/UL — HIGH (ref 0.1–0.6)
MONOCYTES NFR BLD AUTO: 11.4 % — HIGH (ref 1.7–9.3)
NEUTROPHILS # BLD AUTO: 11.88 K/UL — HIGH (ref 1.4–6.5)
NEUTROPHILS NFR BLD AUTO: 80.7 % — HIGH (ref 42.2–75.2)
PLATELET # BLD AUTO: 322 K/UL — SIGNIFICANT CHANGE UP (ref 130–400)
POTASSIUM SERPL-MCNC: 3.9 MMOL/L — SIGNIFICANT CHANGE UP (ref 3.5–5)
POTASSIUM SERPL-SCNC: 3.9 MMOL/L — SIGNIFICANT CHANGE UP (ref 3.5–5)
PROT SERPL-MCNC: 5.4 G/DL — LOW (ref 6–8)
RBC # BLD: 3.72 M/UL — LOW (ref 4.2–5.4)
RBC # FLD: 13.6 % — SIGNIFICANT CHANGE UP (ref 11.5–14.5)
SODIUM SERPL-SCNC: 135 MMOL/L — SIGNIFICANT CHANGE UP (ref 135–146)
WBC # BLD: 14.73 K/UL — HIGH (ref 4.8–10.8)
WBC # FLD AUTO: 14.73 K/UL — HIGH (ref 4.8–10.8)

## 2018-02-21 RX ORDER — GABAPENTIN 400 MG/1
200 CAPSULE ORAL THREE TIMES A DAY
Qty: 0 | Refills: 0 | Status: DISCONTINUED | OUTPATIENT
Start: 2018-02-21 | End: 2018-02-28

## 2018-02-21 RX ORDER — MORPHINE SULFATE 50 MG/1
2 CAPSULE, EXTENDED RELEASE ORAL EVERY 4 HOURS
Qty: 0 | Refills: 0 | Status: DISCONTINUED | OUTPATIENT
Start: 2018-02-21 | End: 2018-02-25

## 2018-02-21 RX ORDER — MORPHINE SULFATE 50 MG/1
4 CAPSULE, EXTENDED RELEASE ORAL EVERY 4 HOURS
Qty: 0 | Refills: 0 | Status: DISCONTINUED | OUTPATIENT
Start: 2018-02-21 | End: 2018-02-25

## 2018-02-21 RX ORDER — ACETAMINOPHEN 500 MG
650 TABLET ORAL EVERY 6 HOURS
Qty: 0 | Refills: 0 | Status: DISCONTINUED | OUTPATIENT
Start: 2018-02-21 | End: 2018-02-28

## 2018-02-21 RX ORDER — ACETAMINOPHEN 500 MG
650 TABLET ORAL EVERY 6 HOURS
Qty: 0 | Refills: 0 | Status: DISCONTINUED | OUTPATIENT
Start: 2018-02-21 | End: 2018-02-21

## 2018-02-21 RX ORDER — HYDROMORPHONE HYDROCHLORIDE 2 MG/ML
1 INJECTION INTRAMUSCULAR; INTRAVENOUS; SUBCUTANEOUS
Qty: 0 | Refills: 0 | Status: DISCONTINUED | OUTPATIENT
Start: 2018-02-21 | End: 2018-02-21

## 2018-02-21 RX ORDER — GABAPENTIN 400 MG/1
100 CAPSULE ORAL THREE TIMES A DAY
Qty: 0 | Refills: 0 | Status: DISCONTINUED | OUTPATIENT
Start: 2018-02-21 | End: 2018-02-21

## 2018-02-21 RX ORDER — GABAPENTIN 400 MG/1
300 CAPSULE ORAL THREE TIMES A DAY
Qty: 0 | Refills: 0 | Status: DISCONTINUED | OUTPATIENT
Start: 2018-02-21 | End: 2018-02-21

## 2018-02-21 RX ADMIN — MORPHINE SULFATE 4 MILLIGRAM(S): 50 CAPSULE, EXTENDED RELEASE ORAL at 16:39

## 2018-02-21 RX ADMIN — MORPHINE SULFATE 4 MILLIGRAM(S): 50 CAPSULE, EXTENDED RELEASE ORAL at 16:55

## 2018-02-21 RX ADMIN — ONDANSETRON 4 MILLIGRAM(S): 8 TABLET, FILM COATED ORAL at 05:25

## 2018-02-21 RX ADMIN — ENOXAPARIN SODIUM 40 MILLIGRAM(S): 100 INJECTION SUBCUTANEOUS at 01:38

## 2018-02-21 RX ADMIN — ONDANSETRON 4 MILLIGRAM(S): 8 TABLET, FILM COATED ORAL at 10:24

## 2018-02-21 RX ADMIN — ONDANSETRON 4 MILLIGRAM(S): 8 TABLET, FILM COATED ORAL at 17:42

## 2018-02-21 RX ADMIN — GABAPENTIN 200 MILLIGRAM(S): 400 CAPSULE ORAL at 21:40

## 2018-02-21 RX ADMIN — MORPHINE SULFATE 2 MILLIGRAM(S): 50 CAPSULE, EXTENDED RELEASE ORAL at 10:19

## 2018-02-21 RX ADMIN — MORPHINE SULFATE 2 MILLIGRAM(S): 50 CAPSULE, EXTENDED RELEASE ORAL at 14:35

## 2018-02-21 RX ADMIN — Medication 650 MILLIGRAM(S): at 18:12

## 2018-02-21 RX ADMIN — MORPHINE SULFATE 2 MILLIGRAM(S): 50 CAPSULE, EXTENDED RELEASE ORAL at 01:38

## 2018-02-21 RX ADMIN — MORPHINE SULFATE 2 MILLIGRAM(S): 50 CAPSULE, EXTENDED RELEASE ORAL at 10:35

## 2018-02-21 RX ADMIN — Medication 650 MILLIGRAM(S): at 17:42

## 2018-02-21 RX ADMIN — Medication 400 MILLIGRAM(S): at 11:15

## 2018-02-21 RX ADMIN — MORPHINE SULFATE 2 MILLIGRAM(S): 50 CAPSULE, EXTENDED RELEASE ORAL at 05:24

## 2018-02-21 RX ADMIN — Medication 650 MILLIGRAM(S): at 23:36

## 2018-02-21 RX ADMIN — MORPHINE SULFATE 2 MILLIGRAM(S): 50 CAPSULE, EXTENDED RELEASE ORAL at 21:40

## 2018-02-21 RX ADMIN — Medication 1000 MILLIGRAM(S): at 11:30

## 2018-02-21 RX ADMIN — MORPHINE SULFATE 2 MILLIGRAM(S): 50 CAPSULE, EXTENDED RELEASE ORAL at 14:21

## 2018-02-21 RX ADMIN — ONDANSETRON 4 MILLIGRAM(S): 8 TABLET, FILM COATED ORAL at 01:39

## 2018-02-21 RX ADMIN — ONDANSETRON 4 MILLIGRAM(S): 8 TABLET, FILM COATED ORAL at 14:22

## 2018-02-21 RX ADMIN — ONDANSETRON 4 MILLIGRAM(S): 8 TABLET, FILM COATED ORAL at 21:40

## 2018-02-21 RX ADMIN — PANTOPRAZOLE SODIUM 40 MILLIGRAM(S): 20 TABLET, DELAYED RELEASE ORAL at 01:39

## 2018-02-22 LAB
ALBUMIN SERPL ELPH-MCNC: 2.7 G/DL — LOW (ref 3–5.5)
ALBUMIN SERPL ELPH-MCNC: 2.8 G/DL — LOW (ref 3–5.5)
ALP SERPL-CCNC: 63 U/L — SIGNIFICANT CHANGE UP (ref 30–115)
ALP SERPL-CCNC: 63 U/L — SIGNIFICANT CHANGE UP (ref 30–115)
ALT FLD-CCNC: 80 U/L — HIGH (ref 0–41)
ALT FLD-CCNC: 88 U/L — HIGH (ref 0–41)
ANION GAP SERPL CALC-SCNC: 6 MMOL/L — LOW (ref 7–14)
ANION GAP SERPL CALC-SCNC: 6 MMOL/L — LOW (ref 7–14)
APPEARANCE UR: (no result)
AST SERPL-CCNC: 48 U/L — HIGH (ref 0–41)
AST SERPL-CCNC: 54 U/L — HIGH (ref 0–41)
BASOPHILS # BLD AUTO: 0.02 K/UL — SIGNIFICANT CHANGE UP (ref 0–0.2)
BASOPHILS # BLD AUTO: 0.03 K/UL — SIGNIFICANT CHANGE UP (ref 0–0.2)
BASOPHILS NFR BLD AUTO: 0.1 % — SIGNIFICANT CHANGE UP (ref 0–1)
BASOPHILS NFR BLD AUTO: 0.1 % — SIGNIFICANT CHANGE UP (ref 0–1)
BILIRUB DIRECT SERPL-MCNC: 0.5 MG/DL — HIGH (ref 0–0.2)
BILIRUB DIRECT SERPL-MCNC: 0.5 MG/DL — HIGH (ref 0–0.2)
BILIRUB INDIRECT FLD-MCNC: 0.8 MG/DL — SIGNIFICANT CHANGE UP
BILIRUB INDIRECT FLD-MCNC: 0.8 MG/DL — SIGNIFICANT CHANGE UP
BILIRUB SERPL-MCNC: 1.3 MG/DL — HIGH (ref 0.2–1.2)
BILIRUB SERPL-MCNC: 1.3 MG/DL — HIGH (ref 0.2–1.2)
BILIRUB UR-MCNC: NEGATIVE — SIGNIFICANT CHANGE UP
BUN SERPL-MCNC: 6 MG/DL — LOW (ref 10–20)
BUN SERPL-MCNC: 7 MG/DL — LOW (ref 10–20)
CALCIUM SERPL-MCNC: 7.9 MG/DL — LOW (ref 8.5–10.1)
CALCIUM SERPL-MCNC: 8.5 MG/DL — SIGNIFICANT CHANGE UP (ref 8.5–10.1)
CHLORIDE SERPL-SCNC: 103 MMOL/L — SIGNIFICANT CHANGE UP (ref 98–110)
CHLORIDE SERPL-SCNC: 104 MMOL/L — SIGNIFICANT CHANGE UP (ref 98–110)
CK MB CFR SERPL CALC: 1.8 NG/ML — SIGNIFICANT CHANGE UP (ref 0.6–6.3)
CK SERPL-CCNC: 1158 U/L — HIGH (ref 0–225)
CO2 SERPL-SCNC: 22 MMOL/L — SIGNIFICANT CHANGE UP (ref 17–32)
CO2 SERPL-SCNC: 23 MMOL/L — SIGNIFICANT CHANGE UP (ref 17–32)
COLOR SPEC: SIGNIFICANT CHANGE UP
CREAT SERPL-MCNC: 0.5 MG/DL — LOW (ref 0.7–1.5)
CREAT SERPL-MCNC: 0.7 MG/DL — SIGNIFICANT CHANGE UP (ref 0.7–1.5)
DIFF PNL FLD: (no result)
EOSINOPHIL # BLD AUTO: 0.01 K/UL — SIGNIFICANT CHANGE UP (ref 0–0.7)
EOSINOPHIL # BLD AUTO: 0.01 K/UL — SIGNIFICANT CHANGE UP (ref 0–0.7)
EOSINOPHIL NFR BLD AUTO: 0 % — SIGNIFICANT CHANGE UP (ref 0–8)
EOSINOPHIL NFR BLD AUTO: 0 % — SIGNIFICANT CHANGE UP (ref 0–8)
EPI CELLS # UR: (no result) /HPF
GLUCOSE SERPL-MCNC: 144 MG/DL — HIGH (ref 70–110)
GLUCOSE SERPL-MCNC: 155 MG/DL — HIGH (ref 70–110)
GLUCOSE UR QL: NEGATIVE — SIGNIFICANT CHANGE UP
HCT VFR BLD CALC: 28.6 % — LOW (ref 37–47)
HCT VFR BLD CALC: 30.8 % — LOW (ref 37–47)
HGB BLD-MCNC: 10.3 G/DL — LOW (ref 14–18)
HGB BLD-MCNC: 9.7 G/DL — LOW (ref 14–18)
IMM GRANULOCYTES NFR BLD AUTO: 1.3 % — HIGH (ref 0.1–0.3)
IMM GRANULOCYTES NFR BLD AUTO: 1.4 % — HIGH (ref 0.1–0.3)
KETONES UR-MCNC: NEGATIVE — SIGNIFICANT CHANGE UP
LEUKOCYTE ESTERASE UR-ACNC: (no result)
LYMPHOCYTES # BLD AUTO: 1.39 K/UL — SIGNIFICANT CHANGE UP (ref 1.2–3.4)
LYMPHOCYTES # BLD AUTO: 1.48 K/UL — SIGNIFICANT CHANGE UP (ref 1.2–3.4)
LYMPHOCYTES # BLD AUTO: 5.7 % — LOW (ref 20.5–51.1)
LYMPHOCYTES # BLD AUTO: 6.1 % — LOW (ref 20.5–51.1)
MAGNESIUM SERPL-MCNC: 1.9 MG/DL — SIGNIFICANT CHANGE UP (ref 1.8–2.4)
MAGNESIUM SERPL-MCNC: 1.9 MG/DL — SIGNIFICANT CHANGE UP (ref 1.8–2.4)
MCHC RBC-ENTMCNC: 29.2 PG — SIGNIFICANT CHANGE UP (ref 27–31)
MCHC RBC-ENTMCNC: 29.5 PG — SIGNIFICANT CHANGE UP (ref 27–31)
MCHC RBC-ENTMCNC: 33.4 G/DL — SIGNIFICANT CHANGE UP (ref 32–37)
MCHC RBC-ENTMCNC: 33.9 G/DL — SIGNIFICANT CHANGE UP (ref 32–37)
MCV RBC AUTO: 86.1 FL — SIGNIFICANT CHANGE UP (ref 81–91)
MCV RBC AUTO: 88.3 FL — SIGNIFICANT CHANGE UP (ref 81–91)
MONOCYTES # BLD AUTO: 2.25 K/UL — HIGH (ref 0.1–0.6)
MONOCYTES # BLD AUTO: 2.51 K/UL — HIGH (ref 0.1–0.6)
MONOCYTES NFR BLD AUTO: 10.4 % — HIGH (ref 1.7–9.3)
MONOCYTES NFR BLD AUTO: 9.3 % — SIGNIFICANT CHANGE UP (ref 1.7–9.3)
NEUTROPHILS # BLD AUTO: 19.78 K/UL — HIGH (ref 1.4–6.5)
NEUTROPHILS # BLD AUTO: 20.19 K/UL — HIGH (ref 1.4–6.5)
NEUTROPHILS NFR BLD AUTO: 82.1 % — HIGH (ref 42.2–75.2)
NEUTROPHILS NFR BLD AUTO: 83.5 % — HIGH (ref 42.2–75.2)
NITRITE UR-MCNC: NEGATIVE — SIGNIFICANT CHANGE UP
NRBC # BLD: 0 /100 WBCS — SIGNIFICANT CHANGE UP (ref 0–0)
NRBC # BLD: 0 /100 WBCS — SIGNIFICANT CHANGE UP (ref 0–0)
PH UR: 6 — SIGNIFICANT CHANGE UP (ref 5–8)
PHOSPHATE SERPL-MCNC: 1.9 MG/DL — LOW (ref 2.1–4.9)
PHOSPHATE SERPL-MCNC: 1.9 MG/DL — LOW (ref 2.1–4.9)
PLATELET # BLD AUTO: 295 K/UL — SIGNIFICANT CHANGE UP (ref 130–400)
PLATELET # BLD AUTO: 321 K/UL — SIGNIFICANT CHANGE UP (ref 130–400)
POTASSIUM SERPL-MCNC: 3.4 MMOL/L — LOW (ref 3.5–5)
POTASSIUM SERPL-MCNC: 3.8 MMOL/L — SIGNIFICANT CHANGE UP (ref 3.5–5)
POTASSIUM SERPL-SCNC: 3.4 MMOL/L — LOW (ref 3.5–5)
POTASSIUM SERPL-SCNC: 3.8 MMOL/L — SIGNIFICANT CHANGE UP (ref 3.5–5)
PROT SERPL-MCNC: 5.2 G/DL — LOW (ref 6–8)
PROT SERPL-MCNC: 5.4 G/DL — LOW (ref 6–8)
PROT UR-MCNC: 100
RBC # BLD: 3.32 M/UL — LOW (ref 4.2–5.4)
RBC # BLD: 3.49 M/UL — LOW (ref 4.2–5.4)
RBC # FLD: 13.6 % — SIGNIFICANT CHANGE UP (ref 11.5–14.5)
RBC # FLD: 13.7 % — SIGNIFICANT CHANGE UP (ref 11.5–14.5)
RBC CASTS # UR COMP ASSIST: >50 /HPF
SODIUM SERPL-SCNC: 131 MMOL/L — LOW (ref 135–146)
SODIUM SERPL-SCNC: 133 MMOL/L — LOW (ref 135–146)
SP GR SPEC: >=1.03 — SIGNIFICANT CHANGE UP (ref 1.01–1.03)
TROPONIN I SERPL-MCNC: <0.02 NG/ML — SIGNIFICANT CHANGE UP (ref 0–0.05)
UROBILINOGEN FLD QL: 1 (ref 0.2–0.2)
WBC # BLD: 24.13 K/UL — HIGH (ref 4.8–10.8)
WBC # BLD: 24.21 K/UL — HIGH (ref 4.8–10.8)
WBC # FLD AUTO: 24.13 K/UL — HIGH (ref 4.8–10.8)
WBC # FLD AUTO: 24.21 K/UL — HIGH (ref 4.8–10.8)
WBC UR QL: SIGNIFICANT CHANGE UP /HPF

## 2018-02-22 RX ORDER — DEXTROSE MONOHYDRATE, SODIUM CHLORIDE, AND POTASSIUM CHLORIDE 50; .745; 4.5 G/1000ML; G/1000ML; G/1000ML
1000 INJECTION, SOLUTION INTRAVENOUS
Qty: 0 | Refills: 0 | Status: DISCONTINUED | OUTPATIENT
Start: 2018-02-22 | End: 2018-02-24

## 2018-02-22 RX ORDER — POTASSIUM CHLORIDE 20 MEQ
20 PACKET (EA) ORAL
Qty: 0 | Refills: 0 | Status: COMPLETED | OUTPATIENT
Start: 2018-02-22 | End: 2018-02-22

## 2018-02-22 RX ORDER — AMPICILLIN SODIUM AND SULBACTAM SODIUM 250; 125 MG/ML; MG/ML
3 INJECTION, POWDER, FOR SUSPENSION INTRAMUSCULAR; INTRAVENOUS EVERY 6 HOURS
Qty: 0 | Refills: 0 | Status: DISCONTINUED | OUTPATIENT
Start: 2018-02-22 | End: 2018-02-23

## 2018-02-22 RX ORDER — AMPICILLIN SODIUM AND SULBACTAM SODIUM 250; 125 MG/ML; MG/ML
INJECTION, POWDER, FOR SUSPENSION INTRAMUSCULAR; INTRAVENOUS
Qty: 0 | Refills: 0 | Status: DISCONTINUED | OUTPATIENT
Start: 2018-02-22 | End: 2018-02-23

## 2018-02-22 RX ORDER — SODIUM CHLORIDE 9 MG/ML
500 INJECTION INTRAMUSCULAR; INTRAVENOUS; SUBCUTANEOUS ONCE
Qty: 0 | Refills: 0 | Status: COMPLETED | OUTPATIENT
Start: 2018-02-22 | End: 2018-02-22

## 2018-02-22 RX ORDER — AMPICILLIN SODIUM AND SULBACTAM SODIUM 250; 125 MG/ML; MG/ML
3 INJECTION, POWDER, FOR SUSPENSION INTRAMUSCULAR; INTRAVENOUS ONCE
Qty: 0 | Refills: 0 | Status: COMPLETED | OUTPATIENT
Start: 2018-02-22 | End: 2018-02-22

## 2018-02-22 RX ORDER — DIATRIZOATE MEGLUMINE 180 MG/ML
20 INJECTION, SOLUTION INTRAVESICAL ONCE
Qty: 0 | Refills: 0 | Status: COMPLETED | OUTPATIENT
Start: 2018-02-22 | End: 2018-02-22

## 2018-02-22 RX ORDER — POTASSIUM CHLORIDE 20 MEQ
20 PACKET (EA) ORAL ONCE
Qty: 0 | Refills: 0 | Status: COMPLETED | OUTPATIENT
Start: 2018-02-22 | End: 2018-02-22

## 2018-02-22 RX ORDER — SODIUM CHLORIDE 9 MG/ML
1000 INJECTION, SOLUTION INTRAVENOUS
Qty: 0 | Refills: 0 | Status: DISCONTINUED | OUTPATIENT
Start: 2018-02-22 | End: 2018-02-22

## 2018-02-22 RX ORDER — MAGNESIUM SULFATE 500 MG/ML
1 VIAL (ML) INJECTION ONCE
Qty: 0 | Refills: 0 | Status: COMPLETED | OUTPATIENT
Start: 2018-02-22 | End: 2018-02-22

## 2018-02-22 RX ORDER — DEXTROSE MONOHYDRATE, SODIUM CHLORIDE, AND POTASSIUM CHLORIDE 50; .745; 4.5 G/1000ML; G/1000ML; G/1000ML
1000 INJECTION, SOLUTION INTRAVENOUS
Qty: 0 | Refills: 0 | Status: DISCONTINUED | OUTPATIENT
Start: 2018-02-22 | End: 2018-02-23

## 2018-02-22 RX ORDER — ACETAMINOPHEN 500 MG
650 TABLET ORAL EVERY 6 HOURS
Qty: 0 | Refills: 0 | Status: DISCONTINUED | OUTPATIENT
Start: 2018-02-22 | End: 2018-02-28

## 2018-02-22 RX ADMIN — AMPICILLIN SODIUM AND SULBACTAM SODIUM 200 GRAM(S): 250; 125 INJECTION, POWDER, FOR SUSPENSION INTRAMUSCULAR; INTRAVENOUS at 18:03

## 2018-02-22 RX ADMIN — GABAPENTIN 200 MILLIGRAM(S): 400 CAPSULE ORAL at 15:29

## 2018-02-22 RX ADMIN — MORPHINE SULFATE 2 MILLIGRAM(S): 50 CAPSULE, EXTENDED RELEASE ORAL at 12:52

## 2018-02-22 RX ADMIN — Medication 650 MILLIGRAM(S): at 06:15

## 2018-02-22 RX ADMIN — Medication 50 MILLIEQUIVALENT(S): at 17:39

## 2018-02-22 RX ADMIN — MORPHINE SULFATE 2 MILLIGRAM(S): 50 CAPSULE, EXTENDED RELEASE ORAL at 13:07

## 2018-02-22 RX ADMIN — Medication 85 MILLIMOLE(S): at 15:33

## 2018-02-22 RX ADMIN — DIATRIZOATE MEGLUMINE 20 MILLILITER(S): 180 INJECTION, SOLUTION INTRAVESICAL at 21:59

## 2018-02-22 RX ADMIN — SODIUM CHLORIDE 500 MILLILITER(S): 9 INJECTION INTRAMUSCULAR; INTRAVENOUS; SUBCUTANEOUS at 20:25

## 2018-02-22 RX ADMIN — Medication 50 MILLIEQUIVALENT(S): at 22:52

## 2018-02-22 RX ADMIN — ENOXAPARIN SODIUM 40 MILLIGRAM(S): 100 INJECTION SUBCUTANEOUS at 15:28

## 2018-02-22 RX ADMIN — MORPHINE SULFATE 2 MILLIGRAM(S): 50 CAPSULE, EXTENDED RELEASE ORAL at 03:47

## 2018-02-22 RX ADMIN — SODIUM CHLORIDE 100 MILLILITER(S): 9 INJECTION, SOLUTION INTRAVENOUS at 05:50

## 2018-02-22 RX ADMIN — AMPICILLIN SODIUM AND SULBACTAM SODIUM 200 GRAM(S): 250; 125 INJECTION, POWDER, FOR SUSPENSION INTRAMUSCULAR; INTRAVENOUS at 15:28

## 2018-02-22 RX ADMIN — GABAPENTIN 200 MILLIGRAM(S): 400 CAPSULE ORAL at 05:18

## 2018-02-22 RX ADMIN — Medication 650 MILLIGRAM(S): at 21:58

## 2018-02-22 RX ADMIN — Medication 650 MILLIGRAM(S): at 11:24

## 2018-02-22 RX ADMIN — Medication 100 GRAM(S): at 17:39

## 2018-02-22 RX ADMIN — PANTOPRAZOLE SODIUM 40 MILLIGRAM(S): 20 TABLET, DELAYED RELEASE ORAL at 08:20

## 2018-02-22 RX ADMIN — Medication 650 MILLIGRAM(S): at 05:18

## 2018-02-22 RX ADMIN — Medication 650 MILLIGRAM(S): at 13:39

## 2018-02-22 NOTE — CHART NOTE - NSCHARTNOTEFT_GEN_A_CORE
Was called by RN, patient Febrile 101.2, remains tachycardic HR 120s. Spoke with overnight surgical chief, plan for STAT CTA Chest PE protocol and CT A/P w/ PO + IV contrast.    Vital Signs Last 24 Hrs:  T(C): 38.4 (22 Feb 2018 21:06), Max: 38.4 (22 Feb 2018 21:06)  T(F): 101.2 (22 Feb 2018 21:06), Max: 101.2 (22 Feb 2018 21:06)  HR: 123 (22 Feb 2018 21:06) (98 - 125)  BP: 125/68 (22 Feb 2018 21:06) (118/78 - 132/72)  BP(mean): --  RR: 20 (22 Feb 2018 21:06) (18 - 20)  SpO2: 97% (22 Feb 2018 21:06) (95% - 97%)    Labs:                       9.7    24.21 )-----------( 295      ( 22 Feb 2018 16:13 )             28.6     02-22    131<L>  |  103  |  7<L>  ----------------------------<  155<H>  3.4<L>   |  22  |  0.5<L>    Ca    7.9<L>      22 Feb 2018 16:13  Phos  1.9     02-22  Mg     1.9     02-22    TPro  5.2<L>  /  Alb  2.8<L>  /  TBili  1.3<H>  /  DBili  0.5<H>  /  AST  48<H>  /  ALT  80<H>  /  AlkPhos  63  02-22

## 2018-02-23 ENCOUNTER — TRANSCRIPTION ENCOUNTER (OUTPATIENT)
Age: 33
End: 2018-02-23

## 2018-02-23 LAB
ALBUMIN SERPL ELPH-MCNC: 2.4 G/DL — LOW (ref 3–5.5)
ALP SERPL-CCNC: 71 U/L — SIGNIFICANT CHANGE UP (ref 30–115)
ALT FLD-CCNC: 62 U/L — HIGH (ref 0–41)
AMYLASE FLD-CCNC: 1407 U/L — SIGNIFICANT CHANGE UP
AMYLASE FLD-CCNC: 4873 U/L — SIGNIFICANT CHANGE UP
ANION GAP SERPL CALC-SCNC: 8 MMOL/L — SIGNIFICANT CHANGE UP (ref 7–14)
AST SERPL-CCNC: 38 U/L — SIGNIFICANT CHANGE UP (ref 0–41)
BASOPHILS # BLD AUTO: 0.04 K/UL — SIGNIFICANT CHANGE UP (ref 0–0.2)
BASOPHILS NFR BLD AUTO: 0.2 % — SIGNIFICANT CHANGE UP (ref 0–1)
BILIRUB DIRECT SERPL-MCNC: 0.7 MG/DL — HIGH (ref 0–0.2)
BILIRUB INDIRECT FLD-MCNC: 0.9 MG/DL — SIGNIFICANT CHANGE UP
BILIRUB SERPL-MCNC: 1.6 MG/DL — HIGH (ref 0.2–1.2)
BUN SERPL-MCNC: <5 MG/DL — LOW (ref 10–20)
CALCIUM SERPL-MCNC: 8 MG/DL — LOW (ref 8.5–10.1)
CHLORIDE SERPL-SCNC: 106 MMOL/L — SIGNIFICANT CHANGE UP (ref 98–110)
CO2 SERPL-SCNC: 20 MMOL/L — SIGNIFICANT CHANGE UP (ref 17–32)
CREAT SERPL-MCNC: 0.4 MG/DL — LOW (ref 0.7–1.5)
EOSINOPHIL # BLD AUTO: 0.02 K/UL — SIGNIFICANT CHANGE UP (ref 0–0.7)
EOSINOPHIL NFR BLD AUTO: 0.1 % — SIGNIFICANT CHANGE UP (ref 0–8)
GLUCOSE SERPL-MCNC: 121 MG/DL — HIGH (ref 70–110)
HCT VFR BLD CALC: 27.9 % — LOW (ref 37–47)
HGB BLD-MCNC: 9.4 G/DL — LOW (ref 14–18)
IMM GRANULOCYTES NFR BLD AUTO: 1.6 % — HIGH (ref 0.1–0.3)
LYMPHOCYTES # BLD AUTO: 1.78 K/UL — SIGNIFICANT CHANGE UP (ref 1.2–3.4)
LYMPHOCYTES # BLD AUTO: 7.1 % — LOW (ref 20.5–51.1)
MAGNESIUM SERPL-MCNC: 2 MG/DL — SIGNIFICANT CHANGE UP (ref 1.8–2.4)
MCHC RBC-ENTMCNC: 29.5 PG — SIGNIFICANT CHANGE UP (ref 27–31)
MCHC RBC-ENTMCNC: 33.7 G/DL — SIGNIFICANT CHANGE UP (ref 32–37)
MCV RBC AUTO: 87.5 FL — SIGNIFICANT CHANGE UP (ref 81–91)
MONOCYTES # BLD AUTO: 1.76 K/UL — HIGH (ref 0.1–0.6)
MONOCYTES NFR BLD AUTO: 7 % — SIGNIFICANT CHANGE UP (ref 1.7–9.3)
NEUTROPHILS # BLD AUTO: 21 K/UL — HIGH (ref 1.4–6.5)
NEUTROPHILS NFR BLD AUTO: 84 % — HIGH (ref 42.2–75.2)
NRBC # BLD: 0 /100 WBCS — SIGNIFICANT CHANGE UP (ref 0–0)
PHOSPHATE SERPL-MCNC: 2.4 MG/DL — SIGNIFICANT CHANGE UP (ref 2.1–4.9)
PLATELET # BLD AUTO: 321 K/UL — SIGNIFICANT CHANGE UP (ref 130–400)
POTASSIUM SERPL-MCNC: 3.7 MMOL/L — SIGNIFICANT CHANGE UP (ref 3.5–5)
POTASSIUM SERPL-SCNC: 3.7 MMOL/L — SIGNIFICANT CHANGE UP (ref 3.5–5)
PROT SERPL-MCNC: 5.3 G/DL — LOW (ref 6–8)
RBC # BLD: 3.19 M/UL — LOW (ref 4.2–5.4)
RBC # FLD: 13.6 % — SIGNIFICANT CHANGE UP (ref 11.5–14.5)
SODIUM SERPL-SCNC: 134 MMOL/L — LOW (ref 135–146)
SURGICAL PATHOLOGY STUDY: SIGNIFICANT CHANGE UP
WBC # BLD: 24.99 K/UL — HIGH (ref 4.8–10.8)
WBC # FLD AUTO: 24.99 K/UL — HIGH (ref 4.8–10.8)

## 2018-02-23 RX ORDER — VANCOMYCIN HCL 1 G
VIAL (EA) INTRAVENOUS
Qty: 0 | Refills: 0 | Status: DISCONTINUED | OUTPATIENT
Start: 2018-02-23 | End: 2018-02-28

## 2018-02-23 RX ORDER — VANCOMYCIN HCL 1 G
1000 VIAL (EA) INTRAVENOUS ONCE
Qty: 0 | Refills: 0 | Status: COMPLETED | OUTPATIENT
Start: 2018-02-23 | End: 2018-02-23

## 2018-02-23 RX ORDER — CEFEPIME 1 G/1
1000 INJECTION, POWDER, FOR SOLUTION INTRAMUSCULAR; INTRAVENOUS EVERY 12 HOURS
Qty: 0 | Refills: 0 | Status: DISCONTINUED | OUTPATIENT
Start: 2018-02-23 | End: 2018-02-28

## 2018-02-23 RX ORDER — VANCOMYCIN HCL 1 G
1000 VIAL (EA) INTRAVENOUS EVERY 12 HOURS
Qty: 0 | Refills: 0 | Status: DISCONTINUED | OUTPATIENT
Start: 2018-02-23 | End: 2018-02-28

## 2018-02-23 RX ORDER — DIATRIZOATE MEGLUMINE 180 MG/ML
20 INJECTION, SOLUTION INTRAVESICAL ONCE
Qty: 0 | Refills: 0 | Status: COMPLETED | OUTPATIENT
Start: 2018-02-23 | End: 2018-02-23

## 2018-02-23 RX ORDER — CEFEPIME 1 G/1
INJECTION, POWDER, FOR SOLUTION INTRAMUSCULAR; INTRAVENOUS
Qty: 0 | Refills: 0 | Status: DISCONTINUED | OUTPATIENT
Start: 2018-02-23 | End: 2018-02-28

## 2018-02-23 RX ORDER — CEFEPIME 1 G/1
1000 INJECTION, POWDER, FOR SOLUTION INTRAMUSCULAR; INTRAVENOUS ONCE
Qty: 0 | Refills: 0 | Status: COMPLETED | OUTPATIENT
Start: 2018-02-23 | End: 2018-02-23

## 2018-02-23 RX ORDER — POTASSIUM PHOSPHATE, MONOBASIC POTASSIUM PHOSPHATE, DIBASIC 236; 224 MG/ML; MG/ML
30 INJECTION, SOLUTION INTRAVENOUS ONCE
Qty: 0 | Refills: 0 | Status: COMPLETED | OUTPATIENT
Start: 2018-02-23 | End: 2018-02-23

## 2018-02-23 RX ADMIN — PANTOPRAZOLE SODIUM 40 MILLIGRAM(S): 20 TABLET, DELAYED RELEASE ORAL at 23:57

## 2018-02-23 RX ADMIN — ENOXAPARIN SODIUM 40 MILLIGRAM(S): 100 INJECTION SUBCUTANEOUS at 12:15

## 2018-02-23 RX ADMIN — Medication 50 MILLIEQUIVALENT(S): at 01:00

## 2018-02-23 RX ADMIN — PANTOPRAZOLE SODIUM 40 MILLIGRAM(S): 20 TABLET, DELAYED RELEASE ORAL at 13:13

## 2018-02-23 RX ADMIN — AMPICILLIN SODIUM AND SULBACTAM SODIUM 200 GRAM(S): 250; 125 INJECTION, POWDER, FOR SUSPENSION INTRAMUSCULAR; INTRAVENOUS at 01:00

## 2018-02-23 RX ADMIN — MORPHINE SULFATE 2 MILLIGRAM(S): 50 CAPSULE, EXTENDED RELEASE ORAL at 05:38

## 2018-02-23 RX ADMIN — CEFEPIME 100 MILLIGRAM(S): 1 INJECTION, POWDER, FOR SOLUTION INTRAMUSCULAR; INTRAVENOUS at 13:19

## 2018-02-23 RX ADMIN — GABAPENTIN 200 MILLIGRAM(S): 400 CAPSULE ORAL at 13:13

## 2018-02-23 RX ADMIN — Medication 650 MILLIGRAM(S): at 23:57

## 2018-02-23 RX ADMIN — POTASSIUM PHOSPHATE, MONOBASIC POTASSIUM PHOSPHATE, DIBASIC 85 MILLIMOLE(S): 236; 224 INJECTION, SOLUTION INTRAVENOUS at 21:33

## 2018-02-23 RX ADMIN — DIATRIZOATE MEGLUMINE 20 MILLILITER(S): 180 INJECTION, SOLUTION INTRAVESICAL at 12:19

## 2018-02-23 RX ADMIN — Medication 250 MILLIGRAM(S): at 12:07

## 2018-02-23 RX ADMIN — DEXTROSE MONOHYDRATE, SODIUM CHLORIDE, AND POTASSIUM CHLORIDE 100 MILLILITER(S): 50; .745; 4.5 INJECTION, SOLUTION INTRAVENOUS at 17:20

## 2018-02-23 RX ADMIN — CEFEPIME 100 MILLIGRAM(S): 1 INJECTION, POWDER, FOR SOLUTION INTRAMUSCULAR; INTRAVENOUS at 18:56

## 2018-02-23 RX ADMIN — Medication 650 MILLIGRAM(S): at 12:15

## 2018-02-23 RX ADMIN — AMPICILLIN SODIUM AND SULBACTAM SODIUM 200 GRAM(S): 250; 125 INJECTION, POWDER, FOR SUSPENSION INTRAMUSCULAR; INTRAVENOUS at 05:39

## 2018-02-23 RX ADMIN — GABAPENTIN 200 MILLIGRAM(S): 400 CAPSULE ORAL at 21:31

## 2018-02-23 RX ADMIN — Medication 250 MILLIGRAM(S): at 18:56

## 2018-02-23 RX ADMIN — Medication 650 MILLIGRAM(S): at 05:39

## 2018-02-23 RX ADMIN — Medication 650 MILLIGRAM(S): at 18:57

## 2018-02-23 RX ADMIN — ENOXAPARIN SODIUM 40 MILLIGRAM(S): 100 INJECTION SUBCUTANEOUS at 23:57

## 2018-02-23 RX ADMIN — Medication 650 MILLIGRAM(S): at 06:06

## 2018-02-23 NOTE — CONSULT NOTE ADULT - SUBJECTIVE AND OBJECTIVE BOX
SICU Consultation Note  =====================================================  HPI: 33y Female  HPI:  32yo female pod3 from whipple and ventral hernia repair, febrile to 101.2 and tachycardic overnight, with associated elevation in wcc to 24, ct done overnight showed no pe or fluid collection, findings suggestive of pna, started on appropriate abx. feeling better    PAST MEDICAL & SURGICAL HISTORY:  H/O gastroesophageal reflux (GERD)  No significant past surgical history    Home Meds: Home Medications:  none  Allergies: Allergies    No Known Allergies    Intolerances      Soc:   Advanced Directives: Presumed Full Code     ROS:    REVIEW OF SYSTEMS    [ ] A ten-point review of systems was otherwise negative except as noted.      CURRENT MEDICATIONS:   --------------------------------------------------------------------------------------  Neurologic Medications  acetaminophen   Tablet. 650 milliGRAM(s) Oral every 6 hours  acetaminophen  Suppository 650 milliGRAM(s) Rectal every 6 hours PRN For Temp greater than 38.5 C (101.3 F)  gabapentin 200 milliGRAM(s) Oral three times a day  morphine  - Injectable 4 milliGRAM(s) IV Push every 4 hours PRN Severe Pain (7 - 10)  morphine  - Injectable 2 milliGRAM(s) IV Push every 4 hours PRN Moderate Pain (4 - 6)  ondansetron Injectable 4 milliGRAM(s) IV Push every 4 hours    Respiratory Medications    Cardiovascular Medications    Gastrointestinal Medications  dextrose 5% + sodium chloride 0.9% with potassium chloride 20 mEq/L 1000 milliLiter(s) IV Continuous <Continuous>  pantoprazole  Injectable 40 milliGRAM(s) IV Push every 24 hours  potassium phosphate IVPB 30 milliMole(s) IV Intermittent once    Genitourinary Medications    Hematologic/Oncologic Medications  enoxaparin Injectable 40 milliGRAM(s) SubCutaneous every 24 hours    Antimicrobial/Immunologic Medications  cefepime  IVPB      cefepime  IVPB 1000 milliGRAM(s) IV Intermittent every 12 hours  vancomycin  IVPB      vancomycin  IVPB 1000 milliGRAM(s) IV Intermittent every 12 hours    Endocrine/Metabolic Medications    Topical/Other Medications    --------------------------------------------------------------------------------------    VITAL SIGNS, INS/OUTS (last 24 hours):  --------------------------------------------------------------------------------------  ICU Vital Signs Last 24 Hrs  T(C): 37.8 (23 Feb 2018 16:56), Max: 38.4 (22 Feb 2018 21:06)  T(F): 100 (23 Feb 2018 16:56), Max: 101.2 (22 Feb 2018 21:06)  HR: 97 (23 Feb 2018 15:44) (97 - 125)  BP: 116/68 (23 Feb 2018 15:44) (116/68 - 142/62)  BP(mean): --  ABP: --  ABP(mean): --  RR: 18 (23 Feb 2018 15:44) (18 - 20)  SpO2: 97% (22 Feb 2018 21:06) (95% - 97%)    I&O's Summary    22 Feb 2018 07:01  -  23 Feb 2018 07:00  --------------------------------------------------------  IN: 2340 mL / OUT: 660 mL / NET: 1680 mL    23 Feb 2018 07:01  -  23 Feb 2018 17:51  --------------------------------------------------------  IN: 0 mL / OUT: 615 mL / NET: -615 mL      --------------------------------------------------------------------------------------    EXAM:  General/Neuro  Exam: Normal, NAD, alert, oriented x 3, no focal deficits.    Respiratory  Exam: Lungs clear to auscultation, Normal expansion/effort.     Cardiovascular  Exam: S1, S2.  Regular rate and rhythm.  Peripheral edema   Cardiac Rhythm: Normal Sinus Rhythm  ECHO: normal    GI  Exam: Abdomen soft, per incisional tender, Non-distended.    Wound:   c/d/i      Extremities  Exam: Extremities warm, pink, well-perfused.        LABS  --------------------------------------------------------------------------------------  Labs:  CAPILLARY BLOOD GLUCOSE                              9.4    24.99 )-----------( 321      ( 23 Feb 2018 07:24 )             27.9       Auto Neutrophil %: 84.0 % (02-23-18 @ 07:24)  Auto Immature Granulocyte %: 1.6 % (02-23-18 @ 07:24)    02-23    134<L>  |  106  |  <5<L>  ----------------------------<  121<H>  3.7   |  20  |  0.4<L>      Calcium, Total Serum: 8.0 mg/dL (02-23-18 @ 07:24)  Magnesium, Serum: 2.0 mg/dL (02-23-18 @ 07:24)  Phosphorus Level, Serum: 2.4 mg/dL (02-23-18 @ 07:24)      LFTs:             5.3  | 1.6  | 38       ------------------[71      ( 23 Feb 2018 07:24 )  2.4  | 0.7  | 62          Lipase:x      Amylase:x             Coags:    CARDIAC MARKERS ( 22 Feb 2018 16:13 )  <0.02 ng/mL / x     / 1158 U/L / x     / 1.8 ng/mL      Urinalysis Basic - ( 22 Feb 2018 21:31 )    Color: Dark Yellow / Appearance: Cloudy / SG: >=1.030 / pH: x  Gluc: x / Ketone: Negative  / Bili: Negative / Urobili: 1.0   Blood: x / Protein: 100 / Nitrite: Negative   Leuk Esterase: Small / RBC: >50 /HPF / WBC 1-2 /HPF   Sq Epi: x / Non Sq Epi: Few /HPF / Bacteria: x          --------------------------------------------------------------------------------------    OTHER LABS    IMAGING RESULTS  < from: CT Chest w/ IV Cont (02.22.18 @ 23:38) >  No central or lobar pulmonary embolism.    Bilateral lower lobe, right greater than left, parenchymal consolidative   opacities with air bronchogram compatible with infection/inflammatory   process. With a history of recent surgery, this could also be compatible   with atelectasis. Correlate clinically.    < end of copied text >  < from: CT Abdomen and Pelvis w/ Oral Cont and w/ IV Cont (02.22.18 @ 23:38) >  There is no discrete fluid collection within the resection bed in this   patient post Whipple. All changes are postsurgical.    There is colonic wall thickening of the right colon and transverse colon,   which may be related to edematous change. Colitis is not excluded.    Mildly dilated loops of small bowel in the left abdomen likely represent   ileus.    < end of copied text >    < from: CT Abdomen and Pelvis w/ Oral Cont (02.23.18 @ 15:04) >  1.  No evidence ofan aortic dissection or intramural hematoma.    2.  Since February 22, 2018, unchanged bibasilar right greater than left   atelectasis.    3.  Post Whipple procedure with stable postsurgical changes as above.    4.  Additional findings are unchanged on the short-term follow-up   examination.    < end of copied text >

## 2018-02-23 NOTE — DISCHARGE NOTE ADULT - HOSPITAL COURSE
33F admitted s/p elective Whipple procedure for newly diagnosed solid pseudopapillary neoplasm. Patient underwent the procedure on 2/20. She left the OR with 2 DIMITRY drains and an NG tube. She was discharged from PACU to the general surgery floor. On postoperative day 2 she was febrile and tachycardic, CTA was ordered to rule out pulmonary embolism. CTA was negative for PE, but possible thoracic aorta dissection was demonstrated. Though this was assumed to be a motion artifact, further workup was indicated and patient underwent LOVE (negative) and dedicated CT study which was also negative for aortic pathology. Patient's tachycardia resolved and the elevation was presumed to be related to pain.  Patient had persistently elevated WBC, which began trending down 2 days prior to discharge.

## 2018-02-23 NOTE — DISCHARGE NOTE ADULT - CARE PLAN
Principal Discharge DX:	Solid pseudopapillary carcinoma  Goal:	Complete recovery s/p Whipple procedure  Assessment and plan of treatment:	Follow up in clinic with Dr. Hudson on 3/7

## 2018-02-23 NOTE — DISCHARGE NOTE ADULT - PATIENT PORTAL LINK FT
You can access the [facility] Patient Portal, offered by [facility], by registering with the following website: [email or web address]

## 2018-02-23 NOTE — CONSULT NOTE ADULT - ASSESSMENT
ASSESSMENT:  33y Female s/p whipple ventral hernia repair now with presumed pna    -continue abx  -follow up cultures   -no indication for icu at this time  -low threshold for upgrade if pt does not improve with antibiotics   -d/w dr arana

## 2018-02-24 LAB
ALBUMIN SERPL ELPH-MCNC: 2.3 G/DL — LOW (ref 3–5.5)
ALP SERPL-CCNC: 92 U/L — SIGNIFICANT CHANGE UP (ref 30–115)
ALT FLD-CCNC: 55 U/L — HIGH (ref 0–41)
AMYLASE P1 CFR SERPL: 113 U/L — SIGNIFICANT CHANGE UP (ref 25–115)
ANION GAP SERPL CALC-SCNC: 8 MMOL/L — SIGNIFICANT CHANGE UP (ref 7–14)
APPEARANCE UR: (no result)
AST SERPL-CCNC: 31 U/L — SIGNIFICANT CHANGE UP (ref 0–41)
BASOPHILS # BLD AUTO: 0.02 K/UL — SIGNIFICANT CHANGE UP (ref 0–0.2)
BASOPHILS NFR BLD AUTO: 0.1 % — SIGNIFICANT CHANGE UP (ref 0–1)
BILIRUB DIRECT SERPL-MCNC: 0.5 MG/DL — HIGH (ref 0–0.2)
BILIRUB INDIRECT FLD-MCNC: 0.7 MG/DL — SIGNIFICANT CHANGE UP
BILIRUB SERPL-MCNC: 1.2 MG/DL — SIGNIFICANT CHANGE UP (ref 0.2–1.2)
BILIRUB UR-MCNC: NEGATIVE — SIGNIFICANT CHANGE UP
BUN SERPL-MCNC: <5 MG/DL — LOW (ref 10–20)
CALCIUM SERPL-MCNC: 7.9 MG/DL — LOW (ref 8.5–10.1)
CHLORIDE SERPL-SCNC: 104 MMOL/L — SIGNIFICANT CHANGE UP (ref 98–110)
CO2 SERPL-SCNC: 22 MMOL/L — SIGNIFICANT CHANGE UP (ref 17–32)
COLOR SPEC: YELLOW — SIGNIFICANT CHANGE UP
CREAT SERPL-MCNC: 0.4 MG/DL — LOW (ref 0.7–1.5)
DIFF PNL FLD: (no result)
EOSINOPHIL # BLD AUTO: 0.14 K/UL — SIGNIFICANT CHANGE UP (ref 0–0.7)
EOSINOPHIL NFR BLD AUTO: 0.7 % — SIGNIFICANT CHANGE UP (ref 0–8)
GLUCOSE SERPL-MCNC: 62 MG/DL — LOW (ref 70–110)
GLUCOSE UR QL: NEGATIVE MG/DL — SIGNIFICANT CHANGE UP
HCT VFR BLD CALC: 25.2 % — LOW (ref 37–47)
HGB BLD-MCNC: 8.5 G/DL — LOW (ref 14–18)
IMM GRANULOCYTES NFR BLD AUTO: 0.8 % — HIGH (ref 0.1–0.3)
KETONES UR-MCNC: NEGATIVE — SIGNIFICANT CHANGE UP
LEUKOCYTE ESTERASE UR-ACNC: NEGATIVE — SIGNIFICANT CHANGE UP
LYMPHOCYTES # BLD AUTO: 1.14 K/UL — LOW (ref 1.2–3.4)
LYMPHOCYTES # BLD AUTO: 5.7 % — LOW (ref 20.5–51.1)
MAGNESIUM SERPL-MCNC: 1.9 MG/DL — SIGNIFICANT CHANGE UP (ref 1.8–2.4)
MCHC RBC-ENTMCNC: 29.3 PG — SIGNIFICANT CHANGE UP (ref 27–31)
MCHC RBC-ENTMCNC: 33.7 G/DL — SIGNIFICANT CHANGE UP (ref 32–37)
MCV RBC AUTO: 86.9 FL — SIGNIFICANT CHANGE UP (ref 81–91)
MONOCYTES # BLD AUTO: 1.89 K/UL — HIGH (ref 0.1–0.6)
MONOCYTES NFR BLD AUTO: 9.4 % — HIGH (ref 1.7–9.3)
NEUTROPHILS # BLD AUTO: 16.77 K/UL — HIGH (ref 1.4–6.5)
NEUTROPHILS NFR BLD AUTO: 83.3 % — HIGH (ref 42.2–75.2)
NITRITE UR-MCNC: NEGATIVE — SIGNIFICANT CHANGE UP
NRBC # BLD: 0 /100 WBCS — SIGNIFICANT CHANGE UP (ref 0–0)
PH UR: 7.5 — SIGNIFICANT CHANGE UP (ref 5–8)
PHOSPHATE SERPL-MCNC: 3 MG/DL — SIGNIFICANT CHANGE UP (ref 2.1–4.9)
PLATELET # BLD AUTO: 330 K/UL — SIGNIFICANT CHANGE UP (ref 130–400)
POTASSIUM SERPL-MCNC: 3.7 MMOL/L — SIGNIFICANT CHANGE UP (ref 3.5–5)
POTASSIUM SERPL-SCNC: 3.7 MMOL/L — SIGNIFICANT CHANGE UP (ref 3.5–5)
PROT SERPL-MCNC: 5.4 G/DL — LOW (ref 6–8)
PROT UR-MCNC: (no result) MG/DL
RBC # BLD: 2.9 M/UL — LOW (ref 4.2–5.4)
RBC # FLD: 13.5 % — SIGNIFICANT CHANGE UP (ref 11.5–14.5)
SODIUM SERPL-SCNC: 134 MMOL/L — LOW (ref 135–146)
SP GR SPEC: 1.01 — SIGNIFICANT CHANGE UP (ref 1.01–1.03)
UROBILINOGEN FLD QL: 1 MG/DL (ref 0.2–0.2)
WBC # BLD: 20.12 K/UL — HIGH (ref 4.8–10.8)
WBC # FLD AUTO: 20.12 K/UL — HIGH (ref 4.8–10.8)

## 2018-02-24 RX ORDER — MAGNESIUM SULFATE 500 MG/ML
1 VIAL (ML) INJECTION ONCE
Qty: 0 | Refills: 0 | Status: COMPLETED | OUTPATIENT
Start: 2018-02-24 | End: 2018-02-24

## 2018-02-24 RX ORDER — DEXTROSE MONOHYDRATE, SODIUM CHLORIDE, AND POTASSIUM CHLORIDE 50; .745; 4.5 G/1000ML; G/1000ML; G/1000ML
1000 INJECTION, SOLUTION INTRAVENOUS
Qty: 0 | Refills: 0 | Status: DISCONTINUED | OUTPATIENT
Start: 2018-02-24 | End: 2018-02-25

## 2018-02-24 RX ORDER — POTASSIUM CHLORIDE 20 MEQ
20 PACKET (EA) ORAL ONCE
Qty: 0 | Refills: 0 | Status: COMPLETED | OUTPATIENT
Start: 2018-02-24 | End: 2018-02-24

## 2018-02-24 RX ADMIN — DEXTROSE MONOHYDRATE, SODIUM CHLORIDE, AND POTASSIUM CHLORIDE 125 MILLILITER(S): 50; .745; 4.5 INJECTION, SOLUTION INTRAVENOUS at 16:32

## 2018-02-24 RX ADMIN — Medication 650 MILLIGRAM(S): at 12:09

## 2018-02-24 RX ADMIN — DEXTROSE MONOHYDRATE, SODIUM CHLORIDE, AND POTASSIUM CHLORIDE 125 MILLILITER(S): 50; .745; 4.5 INJECTION, SOLUTION INTRAVENOUS at 05:00

## 2018-02-24 RX ADMIN — CEFEPIME 100 MILLIGRAM(S): 1 INJECTION, POWDER, FOR SOLUTION INTRAMUSCULAR; INTRAVENOUS at 05:50

## 2018-02-24 RX ADMIN — CEFEPIME 100 MILLIGRAM(S): 1 INJECTION, POWDER, FOR SOLUTION INTRAMUSCULAR; INTRAVENOUS at 20:40

## 2018-02-24 RX ADMIN — Medication 250 MILLIGRAM(S): at 18:54

## 2018-02-24 RX ADMIN — Medication 650 MILLIGRAM(S): at 23:34

## 2018-02-24 RX ADMIN — Medication 250 MILLIGRAM(S): at 05:50

## 2018-02-24 RX ADMIN — Medication 650 MILLIGRAM(S): at 05:50

## 2018-02-24 RX ADMIN — Medication 650 MILLIGRAM(S): at 00:30

## 2018-02-24 RX ADMIN — GABAPENTIN 200 MILLIGRAM(S): 400 CAPSULE ORAL at 21:30

## 2018-02-24 RX ADMIN — Medication 100 GRAM(S): at 12:09

## 2018-02-24 RX ADMIN — GABAPENTIN 200 MILLIGRAM(S): 400 CAPSULE ORAL at 05:50

## 2018-02-24 RX ADMIN — Medication 650 MILLIGRAM(S): at 18:54

## 2018-02-24 RX ADMIN — Medication 50 MILLIEQUIVALENT(S): at 12:10

## 2018-02-24 RX ADMIN — GABAPENTIN 200 MILLIGRAM(S): 400 CAPSULE ORAL at 14:11

## 2018-02-24 RX ADMIN — Medication 650 MILLIGRAM(S): at 14:12

## 2018-02-25 LAB
AMYLASE P1 CFR SERPL: 90 U/L — SIGNIFICANT CHANGE UP (ref 25–115)
ANION GAP SERPL CALC-SCNC: 10 MMOL/L — SIGNIFICANT CHANGE UP (ref 7–14)
BASOPHILS # BLD AUTO: 0.03 K/UL — SIGNIFICANT CHANGE UP (ref 0–0.2)
BASOPHILS NFR BLD AUTO: 0.2 % — SIGNIFICANT CHANGE UP (ref 0–1)
BUN SERPL-MCNC: 5 MG/DL — LOW (ref 10–20)
CALCIUM SERPL-MCNC: 7.8 MG/DL — LOW (ref 8.5–10.1)
CHLORIDE SERPL-SCNC: 97 MMOL/L — LOW (ref 98–110)
CO2 SERPL-SCNC: 23 MMOL/L — SIGNIFICANT CHANGE UP (ref 17–32)
CREAT SERPL-MCNC: 0.4 MG/DL — LOW (ref 0.7–1.5)
EOSINOPHIL # BLD AUTO: 0.31 K/UL — SIGNIFICANT CHANGE UP (ref 0–0.7)
EOSINOPHIL NFR BLD AUTO: 1.8 % — SIGNIFICANT CHANGE UP (ref 0–8)
GLUCOSE SERPL-MCNC: 88 MG/DL — SIGNIFICANT CHANGE UP (ref 70–110)
HCT VFR BLD CALC: 27.2 % — LOW (ref 37–47)
HGB BLD-MCNC: 9.1 G/DL — LOW (ref 14–18)
IMM GRANULOCYTES NFR BLD AUTO: 1.5 % — HIGH (ref 0.1–0.3)
LYMPHOCYTES # BLD AUTO: 1.71 K/UL — SIGNIFICANT CHANGE UP (ref 1.2–3.4)
LYMPHOCYTES # BLD AUTO: 9.8 % — LOW (ref 20.5–51.1)
MAGNESIUM SERPL-MCNC: 2 MG/DL — SIGNIFICANT CHANGE UP (ref 1.8–2.4)
MCHC RBC-ENTMCNC: 29.3 PG — SIGNIFICANT CHANGE UP (ref 27–31)
MCHC RBC-ENTMCNC: 33.5 G/DL — SIGNIFICANT CHANGE UP (ref 32–37)
MCV RBC AUTO: 87.5 FL — SIGNIFICANT CHANGE UP (ref 81–91)
MONOCYTES # BLD AUTO: 1.57 K/UL — HIGH (ref 0.1–0.6)
MONOCYTES NFR BLD AUTO: 9 % — SIGNIFICANT CHANGE UP (ref 1.7–9.3)
NEUTROPHILS # BLD AUTO: 13.54 K/UL — HIGH (ref 1.4–6.5)
NEUTROPHILS NFR BLD AUTO: 77.7 % — HIGH (ref 42.2–75.2)
NRBC # BLD: 0 /100 WBCS — SIGNIFICANT CHANGE UP (ref 0–0)
PHOSPHATE SERPL-MCNC: 2.7 MG/DL — SIGNIFICANT CHANGE UP (ref 2.1–4.9)
PLATELET # BLD AUTO: 432 K/UL — HIGH (ref 130–400)
POTASSIUM SERPL-MCNC: 3.5 MMOL/L — SIGNIFICANT CHANGE UP (ref 3.5–5)
POTASSIUM SERPL-SCNC: 3.5 MMOL/L — SIGNIFICANT CHANGE UP (ref 3.5–5)
RBC # BLD: 3.11 M/UL — LOW (ref 4.2–5.4)
RBC # FLD: 13.3 % — SIGNIFICANT CHANGE UP (ref 11.5–14.5)
SODIUM SERPL-SCNC: 130 MMOL/L — LOW (ref 135–146)
WBC # BLD: 17.43 K/UL — HIGH (ref 4.8–10.8)
WBC # FLD AUTO: 17.43 K/UL — HIGH (ref 4.8–10.8)

## 2018-02-25 RX ORDER — OXYCODONE AND ACETAMINOPHEN 5; 325 MG/1; MG/1
1 TABLET ORAL EVERY 4 HOURS
Qty: 0 | Refills: 0 | Status: DISCONTINUED | OUTPATIENT
Start: 2018-02-25 | End: 2018-02-28

## 2018-02-25 RX ORDER — POTASSIUM CHLORIDE 20 MEQ
20 PACKET (EA) ORAL
Qty: 0 | Refills: 0 | Status: COMPLETED | OUTPATIENT
Start: 2018-02-25 | End: 2018-02-25

## 2018-02-25 RX ORDER — OXYCODONE AND ACETAMINOPHEN 5; 325 MG/1; MG/1
2 TABLET ORAL EVERY 4 HOURS
Qty: 0 | Refills: 0 | Status: DISCONTINUED | OUTPATIENT
Start: 2018-02-25 | End: 2018-02-28

## 2018-02-25 RX ORDER — FUROSEMIDE 40 MG
10 TABLET ORAL ONCE
Qty: 0 | Refills: 0 | Status: COMPLETED | OUTPATIENT
Start: 2018-02-25 | End: 2018-02-26

## 2018-02-25 RX ADMIN — Medication 650 MILLIGRAM(S): at 17:10

## 2018-02-25 RX ADMIN — GABAPENTIN 200 MILLIGRAM(S): 400 CAPSULE ORAL at 05:14

## 2018-02-25 RX ADMIN — CEFEPIME 100 MILLIGRAM(S): 1 INJECTION, POWDER, FOR SOLUTION INTRAMUSCULAR; INTRAVENOUS at 05:15

## 2018-02-25 RX ADMIN — Medication 250 MILLIGRAM(S): at 19:53

## 2018-02-25 RX ADMIN — Medication 250 MILLIGRAM(S): at 05:15

## 2018-02-25 RX ADMIN — Medication 50 MILLIEQUIVALENT(S): at 17:09

## 2018-02-25 RX ADMIN — CEFEPIME 100 MILLIGRAM(S): 1 INJECTION, POWDER, FOR SOLUTION INTRAMUSCULAR; INTRAVENOUS at 17:09

## 2018-02-25 RX ADMIN — GABAPENTIN 200 MILLIGRAM(S): 400 CAPSULE ORAL at 21:56

## 2018-02-25 RX ADMIN — Medication 50 MILLIEQUIVALENT(S): at 19:54

## 2018-02-25 RX ADMIN — PANTOPRAZOLE SODIUM 40 MILLIGRAM(S): 20 TABLET, DELAYED RELEASE ORAL at 01:33

## 2018-02-25 RX ADMIN — Medication 650 MILLIGRAM(S): at 05:13

## 2018-02-25 RX ADMIN — GABAPENTIN 200 MILLIGRAM(S): 400 CAPSULE ORAL at 13:20

## 2018-02-25 RX ADMIN — ENOXAPARIN SODIUM 40 MILLIGRAM(S): 100 INJECTION SUBCUTANEOUS at 01:33

## 2018-02-25 NOTE — DIETITIAN INITIAL EVALUATION ADULT. - OTHER INFO
32y/o femal with h/o GERD, cholecystectomy and solid pseudopapillary carcinoma presented with pancreatic cancer. s/p pancredicoduodenectomy, ex lap, whipple procedure and ventral hernia repair. Skin is intact (John Score-20).

## 2018-02-25 NOTE — DIETITIAN INITIAL EVALUATION ADULT. - ORAL INTAKE PTA
good/Per family, the patient followed a regular diet at home; usually consume three meals at 100% but for past two months the patient consumed <25% of meals due to poor appetite; denies multivitamin use.

## 2018-02-25 NOTE — DIETITIAN INITIAL EVALUATION ADULT. - FACTORS AFF FOOD INTAKE
Per family, the patient consumes 50% of meals at this time secondary to poor appetite. Per family, the patient had two solid bowel movements (2/24).

## 2018-02-26 LAB
AMYLASE FLD-CCNC: 165 U/L — SIGNIFICANT CHANGE UP
AMYLASE FLD-CCNC: 67 U/L — SIGNIFICANT CHANGE UP
ANION GAP SERPL CALC-SCNC: 9 MMOL/L — SIGNIFICANT CHANGE UP (ref 7–14)
ANION GAP SERPL CALC-SCNC: 9 MMOL/L — SIGNIFICANT CHANGE UP (ref 7–14)
BASOPHILS # BLD AUTO: 0.04 K/UL — SIGNIFICANT CHANGE UP (ref 0–0.2)
BASOPHILS NFR BLD AUTO: 0.3 % — SIGNIFICANT CHANGE UP (ref 0–1)
BUN SERPL-MCNC: 5 MG/DL — LOW (ref 10–20)
BUN SERPL-MCNC: 9 MG/DL — LOW (ref 10–20)
CALCIUM SERPL-MCNC: 7.9 MG/DL — LOW (ref 8.5–10.1)
CALCIUM SERPL-MCNC: 8.5 MG/DL — SIGNIFICANT CHANGE UP (ref 8.5–10.1)
CHLORIDE SERPL-SCNC: 96 MMOL/L — LOW (ref 98–110)
CHLORIDE SERPL-SCNC: 98 MMOL/L — SIGNIFICANT CHANGE UP (ref 98–110)
CO2 SERPL-SCNC: 23 MMOL/L — SIGNIFICANT CHANGE UP (ref 17–32)
CO2 SERPL-SCNC: 26 MMOL/L — SIGNIFICANT CHANGE UP (ref 17–32)
CREAT SERPL-MCNC: 0.4 MG/DL — LOW (ref 0.7–1.5)
CREAT SERPL-MCNC: 0.4 MG/DL — LOW (ref 0.7–1.5)
EOSINOPHIL # BLD AUTO: 0.35 K/UL — SIGNIFICANT CHANGE UP (ref 0–0.7)
EOSINOPHIL NFR BLD AUTO: 2.2 % — SIGNIFICANT CHANGE UP (ref 0–8)
GLUCOSE SERPL-MCNC: 80 MG/DL — SIGNIFICANT CHANGE UP (ref 70–110)
GLUCOSE SERPL-MCNC: 88 MG/DL — SIGNIFICANT CHANGE UP (ref 70–110)
HCT VFR BLD CALC: 26.9 % — LOW (ref 37–47)
HGB BLD-MCNC: 9.1 G/DL — LOW (ref 14–18)
IMM GRANULOCYTES NFR BLD AUTO: 3.1 % — HIGH (ref 0.1–0.3)
LYMPHOCYTES # BLD AUTO: 1.81 K/UL — SIGNIFICANT CHANGE UP (ref 1.2–3.4)
LYMPHOCYTES # BLD AUTO: 11.4 % — LOW (ref 20.5–51.1)
MAGNESIUM SERPL-MCNC: 2 MG/DL — SIGNIFICANT CHANGE UP (ref 1.8–2.4)
MCHC RBC-ENTMCNC: 29.1 PG — SIGNIFICANT CHANGE UP (ref 27–31)
MCHC RBC-ENTMCNC: 33.8 G/DL — SIGNIFICANT CHANGE UP (ref 32–37)
MCV RBC AUTO: 85.9 FL — SIGNIFICANT CHANGE UP (ref 81–91)
MONOCYTES # BLD AUTO: 1.75 K/UL — HIGH (ref 0.1–0.6)
MONOCYTES NFR BLD AUTO: 11 % — HIGH (ref 1.7–9.3)
NEUTROPHILS # BLD AUTO: 11.47 K/UL — HIGH (ref 1.4–6.5)
NEUTROPHILS NFR BLD AUTO: 72 % — SIGNIFICANT CHANGE UP (ref 42.2–75.2)
NRBC # BLD: 0 /100 WBCS — SIGNIFICANT CHANGE UP (ref 0–0)
PHOSPHATE SERPL-MCNC: 4.2 MG/DL — SIGNIFICANT CHANGE UP (ref 2.1–4.9)
PLATELET # BLD AUTO: 464 K/UL — HIGH (ref 130–400)
POTASSIUM SERPL-MCNC: 3.6 MMOL/L — SIGNIFICANT CHANGE UP (ref 3.5–5)
POTASSIUM SERPL-MCNC: 4.6 MMOL/L — SIGNIFICANT CHANGE UP (ref 3.5–5)
POTASSIUM SERPL-SCNC: 3.6 MMOL/L — SIGNIFICANT CHANGE UP (ref 3.5–5)
POTASSIUM SERPL-SCNC: 4.6 MMOL/L — SIGNIFICANT CHANGE UP (ref 3.5–5)
RBC # BLD: 3.13 M/UL — LOW (ref 4.2–5.4)
RBC # FLD: 13.2 % — SIGNIFICANT CHANGE UP (ref 11.5–14.5)
SODIUM SERPL-SCNC: 128 MMOL/L — LOW (ref 135–146)
SODIUM SERPL-SCNC: 133 MMOL/L — LOW (ref 135–146)
WBC # BLD: 15.91 K/UL — HIGH (ref 4.8–10.8)
WBC # FLD AUTO: 15.91 K/UL — HIGH (ref 4.8–10.8)

## 2018-02-26 RX ORDER — POTASSIUM CHLORIDE 20 MEQ
20 PACKET (EA) ORAL
Qty: 0 | Refills: 0 | Status: COMPLETED | OUTPATIENT
Start: 2018-02-26 | End: 2018-02-26

## 2018-02-26 RX ORDER — FUROSEMIDE 40 MG
10 TABLET ORAL DAILY
Qty: 0 | Refills: 0 | Status: DISCONTINUED | OUTPATIENT
Start: 2018-02-26 | End: 2018-02-28

## 2018-02-26 RX ORDER — FUROSEMIDE 40 MG
10 TABLET ORAL ONCE
Qty: 0 | Refills: 0 | Status: COMPLETED | OUTPATIENT
Start: 2018-02-26 | End: 2018-02-26

## 2018-02-26 RX ADMIN — Medication 10 MILLIGRAM(S): at 11:41

## 2018-02-26 RX ADMIN — Medication 650 MILLIGRAM(S): at 18:12

## 2018-02-26 RX ADMIN — Medication 650 MILLIGRAM(S): at 05:31

## 2018-02-26 RX ADMIN — Medication 50 MILLIEQUIVALENT(S): at 11:44

## 2018-02-26 RX ADMIN — Medication 50 MILLIEQUIVALENT(S): at 14:19

## 2018-02-26 RX ADMIN — Medication 10 MILLIGRAM(S): at 14:17

## 2018-02-26 RX ADMIN — CEFEPIME 100 MILLIGRAM(S): 1 INJECTION, POWDER, FOR SOLUTION INTRAMUSCULAR; INTRAVENOUS at 18:09

## 2018-02-26 RX ADMIN — ENOXAPARIN SODIUM 40 MILLIGRAM(S): 100 INJECTION SUBCUTANEOUS at 07:29

## 2018-02-26 RX ADMIN — Medication 650 MILLIGRAM(S): at 00:02

## 2018-02-26 RX ADMIN — Medication 650 MILLIGRAM(S): at 17:59

## 2018-02-26 RX ADMIN — GABAPENTIN 200 MILLIGRAM(S): 400 CAPSULE ORAL at 05:30

## 2018-02-26 RX ADMIN — Medication 250 MILLIGRAM(S): at 05:32

## 2018-02-26 RX ADMIN — CEFEPIME 100 MILLIGRAM(S): 1 INJECTION, POWDER, FOR SOLUTION INTRAMUSCULAR; INTRAVENOUS at 05:31

## 2018-02-26 RX ADMIN — Medication 650 MILLIGRAM(S): at 14:42

## 2018-02-26 RX ADMIN — Medication 650 MILLIGRAM(S): at 21:16

## 2018-02-26 RX ADMIN — GABAPENTIN 200 MILLIGRAM(S): 400 CAPSULE ORAL at 14:21

## 2018-02-26 RX ADMIN — GABAPENTIN 200 MILLIGRAM(S): 400 CAPSULE ORAL at 21:17

## 2018-02-26 RX ADMIN — Medication 250 MILLIGRAM(S): at 18:09

## 2018-02-26 RX ADMIN — PANTOPRAZOLE SODIUM 40 MILLIGRAM(S): 20 TABLET, DELAYED RELEASE ORAL at 07:29

## 2018-02-27 LAB
ALBUMIN SERPL ELPH-MCNC: 2.5 G/DL — LOW (ref 3–5.5)
ALBUMIN SERPL ELPH-MCNC: 2.5 G/DL — LOW (ref 3–5.5)
ALP SERPL-CCNC: 196 U/L — HIGH (ref 30–115)
ALP SERPL-CCNC: 199 U/L — HIGH (ref 30–115)
ALT FLD-CCNC: 61 U/L — HIGH (ref 0–41)
ALT FLD-CCNC: 67 U/L — HIGH (ref 0–41)
ANION GAP SERPL CALC-SCNC: 9 MMOL/L — SIGNIFICANT CHANGE UP (ref 7–14)
AST SERPL-CCNC: 29 U/L — SIGNIFICANT CHANGE UP (ref 0–41)
AST SERPL-CCNC: 33 U/L — SIGNIFICANT CHANGE UP (ref 0–41)
BASOPHILS # BLD AUTO: 0.03 K/UL — SIGNIFICANT CHANGE UP (ref 0–0.2)
BASOPHILS NFR BLD AUTO: 0.2 % — SIGNIFICANT CHANGE UP (ref 0–1)
BILIRUB DIRECT SERPL-MCNC: 0.3 MG/DL — HIGH (ref 0–0.2)
BILIRUB DIRECT SERPL-MCNC: 0.3 MG/DL — HIGH (ref 0–0.2)
BILIRUB INDIRECT FLD-MCNC: 0.4 MG/DL — SIGNIFICANT CHANGE UP
BILIRUB INDIRECT FLD-MCNC: 0.6 MG/DL — SIGNIFICANT CHANGE UP
BILIRUB SERPL-MCNC: 0.7 MG/DL — SIGNIFICANT CHANGE UP (ref 0.2–1.2)
BILIRUB SERPL-MCNC: 0.9 MG/DL — SIGNIFICANT CHANGE UP (ref 0.2–1.2)
BUN SERPL-MCNC: 6 MG/DL — LOW (ref 10–20)
CALCIUM SERPL-MCNC: 8.4 MG/DL — LOW (ref 8.5–10.1)
CHLORIDE SERPL-SCNC: 97 MMOL/L — LOW (ref 98–110)
CO2 SERPL-SCNC: 25 MMOL/L — SIGNIFICANT CHANGE UP (ref 17–32)
CREAT SERPL-MCNC: 0.4 MG/DL — LOW (ref 0.7–1.5)
EOSINOPHIL # BLD AUTO: 0.44 K/UL — SIGNIFICANT CHANGE UP (ref 0–0.7)
EOSINOPHIL NFR BLD AUTO: 2.8 % — SIGNIFICANT CHANGE UP (ref 0–8)
GLUCOSE SERPL-MCNC: 119 MG/DL — HIGH (ref 70–110)
HCT VFR BLD CALC: 28.2 % — LOW (ref 37–47)
HGB BLD-MCNC: 9.5 G/DL — LOW (ref 14–18)
IMM GRANULOCYTES NFR BLD AUTO: 5.6 % — HIGH (ref 0.1–0.3)
LYMPHOCYTES # BLD AUTO: 13.5 % — LOW (ref 20.5–51.1)
LYMPHOCYTES # BLD AUTO: 2.12 K/UL — SIGNIFICANT CHANGE UP (ref 1.2–3.4)
MAGNESIUM SERPL-MCNC: 1.9 MG/DL — SIGNIFICANT CHANGE UP (ref 1.8–2.4)
MCHC RBC-ENTMCNC: 28.7 PG — SIGNIFICANT CHANGE UP (ref 27–31)
MCHC RBC-ENTMCNC: 33.7 G/DL — SIGNIFICANT CHANGE UP (ref 32–37)
MCV RBC AUTO: 85.2 FL — SIGNIFICANT CHANGE UP (ref 81–91)
MONOCYTES # BLD AUTO: 1.62 K/UL — HIGH (ref 0.1–0.6)
MONOCYTES NFR BLD AUTO: 10.4 % — HIGH (ref 1.7–9.3)
NEUTROPHILS # BLD AUTO: 10.56 K/UL — HIGH (ref 1.4–6.5)
NEUTROPHILS NFR BLD AUTO: 67.5 % — SIGNIFICANT CHANGE UP (ref 42.2–75.2)
NRBC # BLD: 0 /100 WBCS — SIGNIFICANT CHANGE UP (ref 0–0)
PHOSPHATE SERPL-MCNC: 4.3 MG/DL — SIGNIFICANT CHANGE UP (ref 2.1–4.9)
PLATELET # BLD AUTO: 546 K/UL — HIGH (ref 130–400)
POTASSIUM SERPL-MCNC: 3.8 MMOL/L — SIGNIFICANT CHANGE UP (ref 3.5–5)
POTASSIUM SERPL-SCNC: 3.8 MMOL/L — SIGNIFICANT CHANGE UP (ref 3.5–5)
PROT SERPL-MCNC: 6 G/DL — SIGNIFICANT CHANGE UP (ref 6–8)
PROT SERPL-MCNC: 6.2 G/DL — SIGNIFICANT CHANGE UP (ref 6–8)
RBC # BLD: 3.31 M/UL — LOW (ref 4.2–5.4)
RBC # FLD: 13.1 % — SIGNIFICANT CHANGE UP (ref 11.5–14.5)
SODIUM SERPL-SCNC: 131 MMOL/L — LOW (ref 135–146)
WBC # BLD: 15.65 K/UL — HIGH (ref 4.8–10.8)
WBC # FLD AUTO: 15.65 K/UL — HIGH (ref 4.8–10.8)

## 2018-02-27 RX ADMIN — CEFEPIME 100 MILLIGRAM(S): 1 INJECTION, POWDER, FOR SOLUTION INTRAMUSCULAR; INTRAVENOUS at 18:32

## 2018-02-27 RX ADMIN — GABAPENTIN 200 MILLIGRAM(S): 400 CAPSULE ORAL at 21:38

## 2018-02-27 RX ADMIN — Medication 650 MILLIGRAM(S): at 06:06

## 2018-02-27 RX ADMIN — GABAPENTIN 200 MILLIGRAM(S): 400 CAPSULE ORAL at 05:20

## 2018-02-27 RX ADMIN — Medication 650 MILLIGRAM(S): at 00:13

## 2018-02-27 RX ADMIN — Medication 250 MILLIGRAM(S): at 18:32

## 2018-02-27 RX ADMIN — Medication 650 MILLIGRAM(S): at 05:18

## 2018-02-27 RX ADMIN — Medication 650 MILLIGRAM(S): at 23:54

## 2018-02-27 RX ADMIN — Medication 10 MILLIGRAM(S): at 05:19

## 2018-02-27 RX ADMIN — CEFEPIME 100 MILLIGRAM(S): 1 INJECTION, POWDER, FOR SOLUTION INTRAMUSCULAR; INTRAVENOUS at 05:16

## 2018-02-27 RX ADMIN — ENOXAPARIN SODIUM 40 MILLIGRAM(S): 100 INJECTION SUBCUTANEOUS at 01:17

## 2018-02-27 RX ADMIN — Medication 250 MILLIGRAM(S): at 06:06

## 2018-02-27 RX ADMIN — Medication 650 MILLIGRAM(S): at 18:40

## 2018-02-27 RX ADMIN — Medication 650 MILLIGRAM(S): at 01:10

## 2018-02-27 RX ADMIN — Medication 650 MILLIGRAM(S): at 18:32

## 2018-02-27 RX ADMIN — Medication 650 MILLIGRAM(S): at 11:04

## 2018-02-27 RX ADMIN — PANTOPRAZOLE SODIUM 40 MILLIGRAM(S): 20 TABLET, DELAYED RELEASE ORAL at 01:17

## 2018-02-28 VITALS
DIASTOLIC BLOOD PRESSURE: 60 MMHG | SYSTOLIC BLOOD PRESSURE: 136 MMHG | HEART RATE: 64 BPM | RESPIRATION RATE: 16 BRPM | TEMPERATURE: 97 F

## 2018-02-28 LAB
ALBUMIN SERPL ELPH-MCNC: 2.4 G/DL — LOW (ref 3–5.5)
ALP SERPL-CCNC: 200 U/L — HIGH (ref 30–115)
ALT FLD-CCNC: 50 U/L — HIGH (ref 0–41)
ANION GAP SERPL CALC-SCNC: 8 MMOL/L — SIGNIFICANT CHANGE UP (ref 7–14)
AST SERPL-CCNC: 24 U/L — SIGNIFICANT CHANGE UP (ref 0–41)
BASOPHILS # BLD AUTO: 0.03 K/UL — SIGNIFICANT CHANGE UP (ref 0–0.2)
BASOPHILS NFR BLD AUTO: 0.2 % — SIGNIFICANT CHANGE UP (ref 0–1)
BILIRUB DIRECT SERPL-MCNC: 0.2 MG/DL — SIGNIFICANT CHANGE UP (ref 0–0.2)
BILIRUB INDIRECT FLD-MCNC: 0.5 MG/DL — SIGNIFICANT CHANGE UP
BILIRUB SERPL-MCNC: 0.7 MG/DL — SIGNIFICANT CHANGE UP (ref 0.2–1.2)
BUN SERPL-MCNC: 9 MG/DL — LOW (ref 10–20)
CALCIUM SERPL-MCNC: 8.5 MG/DL — SIGNIFICANT CHANGE UP (ref 8.5–10.1)
CHLORIDE SERPL-SCNC: 98 MMOL/L — SIGNIFICANT CHANGE UP (ref 98–110)
CO2 SERPL-SCNC: 27 MMOL/L — SIGNIFICANT CHANGE UP (ref 17–32)
CREAT SERPL-MCNC: 0.4 MG/DL — LOW (ref 0.7–1.5)
EOSINOPHIL # BLD AUTO: 0.44 K/UL — SIGNIFICANT CHANGE UP (ref 0–0.7)
EOSINOPHIL NFR BLD AUTO: 3.4 % — SIGNIFICANT CHANGE UP (ref 0–8)
GLUCOSE SERPL-MCNC: 105 MG/DL — SIGNIFICANT CHANGE UP (ref 70–110)
HCT VFR BLD CALC: 28.2 % — LOW (ref 37–47)
HGB BLD-MCNC: 9.5 G/DL — LOW (ref 14–18)
IMM GRANULOCYTES NFR BLD AUTO: 5.4 % — HIGH (ref 0.1–0.3)
LYMPHOCYTES # BLD AUTO: 17 % — LOW (ref 20.5–51.1)
LYMPHOCYTES # BLD AUTO: 2.2 K/UL — SIGNIFICANT CHANGE UP (ref 1.2–3.4)
MAGNESIUM SERPL-MCNC: 2 MG/DL — SIGNIFICANT CHANGE UP (ref 1.8–2.4)
MCHC RBC-ENTMCNC: 28.7 PG — SIGNIFICANT CHANGE UP (ref 27–31)
MCHC RBC-ENTMCNC: 33.7 G/DL — SIGNIFICANT CHANGE UP (ref 32–37)
MCV RBC AUTO: 85.2 FL — SIGNIFICANT CHANGE UP (ref 81–91)
MONOCYTES # BLD AUTO: 1.16 K/UL — HIGH (ref 0.1–0.6)
MONOCYTES NFR BLD AUTO: 8.9 % — SIGNIFICANT CHANGE UP (ref 1.7–9.3)
NEUTROPHILS # BLD AUTO: 8.44 K/UL — HIGH (ref 1.4–6.5)
NEUTROPHILS NFR BLD AUTO: 65.1 % — SIGNIFICANT CHANGE UP (ref 42.2–75.2)
NRBC # BLD: 0 /100 WBCS — SIGNIFICANT CHANGE UP (ref 0–0)
PHOSPHATE SERPL-MCNC: 4.3 MG/DL — SIGNIFICANT CHANGE UP (ref 2.1–4.9)
PLATELET # BLD AUTO: 589 K/UL — HIGH (ref 130–400)
POTASSIUM SERPL-MCNC: 3.6 MMOL/L — SIGNIFICANT CHANGE UP (ref 3.5–5)
POTASSIUM SERPL-SCNC: 3.6 MMOL/L — SIGNIFICANT CHANGE UP (ref 3.5–5)
PROT SERPL-MCNC: 5.9 G/DL — LOW (ref 6–8)
RBC # BLD: 3.31 M/UL — LOW (ref 4.2–5.4)
RBC # FLD: 13.1 % — SIGNIFICANT CHANGE UP (ref 11.5–14.5)
SODIUM SERPL-SCNC: 133 MMOL/L — LOW (ref 135–146)
WBC # BLD: 12.97 K/UL — HIGH (ref 4.8–10.8)
WBC # FLD AUTO: 12.97 K/UL — HIGH (ref 4.8–10.8)

## 2018-02-28 RX ORDER — ACETAMINOPHEN 500 MG
2 TABLET ORAL
Qty: 0 | Refills: 0 | COMMUNITY
Start: 2018-02-28

## 2018-02-28 RX ORDER — ACETAMINOPHEN 500 MG
1 TABLET ORAL
Qty: 0 | Refills: 0 | COMMUNITY
Start: 2018-02-28

## 2018-02-28 RX ORDER — GABAPENTIN 400 MG/1
2 CAPSULE ORAL
Qty: 42 | Refills: 0 | OUTPATIENT
Start: 2018-02-28 | End: 2018-03-06

## 2018-02-28 RX ORDER — CIPROFLOXACIN LACTATE 400MG/40ML
1 VIAL (ML) INTRAVENOUS
Qty: 7 | Refills: 0 | OUTPATIENT
Start: 2018-02-28 | End: 2018-03-06

## 2018-02-28 RX ORDER — GABAPENTIN 400 MG/1
2 CAPSULE ORAL
Qty: 0 | Refills: 0 | COMMUNITY
Start: 2018-02-28

## 2018-02-28 RX ADMIN — GABAPENTIN 200 MILLIGRAM(S): 400 CAPSULE ORAL at 05:02

## 2018-02-28 RX ADMIN — Medication 10 MILLIGRAM(S): at 05:03

## 2018-02-28 RX ADMIN — Medication 250 MILLIGRAM(S): at 06:40

## 2018-02-28 RX ADMIN — CEFEPIME 100 MILLIGRAM(S): 1 INJECTION, POWDER, FOR SOLUTION INTRAMUSCULAR; INTRAVENOUS at 05:03

## 2018-02-28 RX ADMIN — Medication 650 MILLIGRAM(S): at 05:02

## 2018-02-28 RX ADMIN — ENOXAPARIN SODIUM 40 MILLIGRAM(S): 100 INJECTION SUBCUTANEOUS at 05:12

## 2018-02-28 NOTE — CHART NOTE - NSCHARTNOTEFT_GEN_A_CORE
Registered Dietitian Follow-Up     Patient Profile Reviewed                           Yes [x]   No []     Nutrition History Previously Obtained        Yes [x]  No []       Pertinent Subjective Information:     Pertinent Medical Interventions: s/p ex-lap whipple procedure and ventral hernia repair      Diet order: soft, Ensure clear. Pt. reports tolerance to soft diet, >50% PO intake, some supplement intake, 3 full bottles observed lingering in the room       Anthropometrics:  - Ht.  - Wt. no new weights documented, pt. OOB to chair during visit-unable to take a bed scale weight   - %wt change  - BMI  - IBW     Pertinent Lab Data: (2/28) RBC 3.31, Hg 9.5, Hct 28.2, Na 133, BUN 9, creat 0.4     Pertinent Meds: Lasix, Zofran, Protonix      Physical Findings:  - Appearance: alert & oriented  - GI function: denies symptoms   - Tubes:  - Oral/Mouth cavity: denies symptoms  - Skin: surgical incision ([address] 21)     Nutrition Requirements  Weight Used:     Estimated Energy Needs    Continue [x]  Adjust [] 1686-1804kcal  Adjusted Energy Recommendations:   kcal/day        Estimated Protein Needs    Continue [x]  Adjust [] 91-102g   Adjusted Protein Recommendations:   gm/day        Estimated Fluid Needs        Continue [x]  Adjust [] 1710-1955ml  Adjusted Fluid Recommendations:   mL/day     Nutrient Intake: current diet order meets estimated needs        [] Previous Nutrition Diagnosis: Inadequate protein calorie intake            [x] Ongoing          []     PO intake increased, will continue to monitor        Nutrition Intervention: meals and snacks, medical food supplement, nutrition related medication management       Rec: Ensure Enlive TID instead of Ensure clear, soft, low fat diet, pancreatic enzymes before meals       Goal/Expected Outcome: In 3 days pt. to consume % PO and supplement      Indicator/Monitoring: diet order, energy intake, body composition, nutrition focused physical findings Registered Dietitian Follow-Up     Patient Profile Reviewed                           Yes [x]   No []     Nutrition History Previously Obtained        Yes [x]  No []       Pertinent Subjective Information: pt. reports appetite improved since initial assessment, reports tolerance to soft diet, >50% PO intake and some supplement intake , requesting Ensure enlive instead of Ensure clear (observed 3 full bottles of Ensure clear lingering in the room)     Pertinent Medical Interventions: s/p ex-lap whipple procedure and ventral hernia repair      Diet order: soft, Ensure clear.   Anthropometrics:  - Ht.  - Wt. no new weights documented, pt. OOB to chair during visit-unable to take a bed scale weight   - %wt change  - BMI  - IBW     Pertinent Lab Data: (2/28) RBC 3.31, Hg 9.5, Hct 28.2, Na 133, BUN 9, creat 0.4     Pertinent Meds: Lasix, Zofran, Protonix      Physical Findings:  - Appearance: alert & oriented  - GI function: denies symptoms   - Tubes:  - Oral/Mouth cavity: denies symptoms  - Skin: surgical incision ([address] 21)     Nutrition Requirements  Weight Used:     Estimated Energy Needs    Continue [x]  Adjust [] 1686-1804kcal  Adjusted Energy Recommendations:   kcal/day        Estimated Protein Needs    Continue [x]  Adjust [] 91-102g   Adjusted Protein Recommendations:   gm/day        Estimated Fluid Needs        Continue [x]  Adjust [] 1710-1955ml  Adjusted Fluid Recommendations:   mL/day     Nutrient Intake: current diet order meets estimated needs        [] Previous Nutrition Diagnosis: Inadequate protein calorie intake            [x] Ongoing          []     PO intake increased, will continue to monitor        Nutrition Intervention: meals and snacks, medical food supplement, nutrition related medication management       Rec: Ensure Enlive TID instead of Ensure clear, soft, low fat diet, pancreatic enzymes before meals       Goal/Expected Outcome: In 3 days pt. to consume % PO and supplement      Indicator/Monitoring: diet order, energy intake, body composition, nutrition focused physical findings

## 2018-02-28 NOTE — PROGRESS NOTE ADULT - ASSESSMENT
33F admitted s/p whipple procedure. Hospital course complicated by low grade fevers and tachycardia, necessitating CTA to r/o PE. No PE was demonstrated but patient had positive finding of double lumen at the root of the aorta. Though this was likely motion artifact, further studies were obtained to r/o dissection. ECHO and CT were negative. Also no signs of leak seen on CT. CT did demonstrate a right sided pneumonia.
Assessment:  33yFemale patient admitted S/P EX-LAP, WHIPPLE PROCEDURE & VENTRAL HERNIA REPAIR; afebrile, pain controlled, patient ambulated w/o issue, R DIMITRY drain removed, L DIMITRY drain pulled 5cm and secured in place, tolerating diet, +flatus, -BM, with the above physical exam, labs, and imaging findings.    Plan:  C/W IV abx  Pain control  encourage ambulation, incentive spirometer  F/U AM labs  SW: Home care nurse for drain care    Date/Time: 02-27-18 @ 03:50
33F admitted s/p whipple procedure for pancreatic head mass
33F found to have solid pseudopapillary neoplasm admitted s/p whipple procedure
33F s/p whipple for solid pseudopapillary neoplasm.
ASSESSMENT/PLAN:   33y y/o  Female S/P EX-LAP, WHIPPLE PROCEDURE & VENTRAL HERNIA REPAIR.    - NPO, NGT, IVF.   - PAIN CONTROL.   - F/U DIMITRY OUTPUTS  - F/U AM LABS.     SPECTRA: [id number]
ASSESSMENT:  33yFemale patient admitted S/P EX-LAP, WHIPPLE PROCEDURE & VENTRAL HERNIA REPAIR; w/ elevated WBC, trending down,  Tmax 100.8, on clears, contrast, pain controlled , able to ambulate multiple times around unit, +flatus, +BM, DIMITRY drains x2 , labs repleted, with the above physical exam, labs, and imaging findings.    Plan:  - TOLERATING CLEARS, CONTINUE FLUIDS  - DIMITRY drain fluid amylase, serum amylase today  - C/W abx, WBC TRENDING DOWN  - PAIN CONTROL/ INCENTIVE HEIDY/OOB AMBULATE.
ASSESSMENT:  33yFemale patient admitted S/P EX-LAP, WHIPPLE PROCEDURE & VENTRAL HERNIA REPAIR; w/ elevated WBC, trending down,  Tmax 100.8, on clears, contrast, pain controlled , able to ambulate multiple times around unit, +flatus, +BM, DIMITRY drains x2 , labs repleted, with the above physical exam, labs, and imaging findings.    Plan:  - TOLERATING fulls.  - f/u DIMITRY drain fluid amylase, serum amylase today  - C/W abx, WBC TRENDING DOWN  - PAIN CONTROL/ INCENTIVE HEIDY/OOB AMBULATE.
Assessment:  33yFemale patient admitted S/P EX-LAP, WHIPPLE PROCEDURE & VENTRAL HERNIA REPAIR; w/ elevated WBC, Tmax 101.2, made NPO, started Unasyn, had CTA chest and CT A/P w/ IV and PO contrast, pain controlled, c/o mild lower back pain, able to ambulate multiple times around unit, +flatus, -BM, DIMITRY drains and serum amylase sent, labs repleted, with the above physical exam, labs, and imaging findings.    Plan:  F/U DIMITRY drain fluid amylase, serum amylase  C/W abx  NPO  IVF  replete lab abnormalities  encourage ambulation  encourage IS use  Pain control    Date/Time: 02-23-18 @ 04:51

## 2018-02-28 NOTE — PROGRESS NOTE ADULT - NSHPATTENDINGPLANDISCUSS_GEN_ALL_CORE
patient and .
Surgical and Nursing staff and Dr. Hudson
Surgical staff, Patient and family
patient, family and surgicalstaff
Surgical and Nursing staff
patient
patient, family and Surgical staff
patient, family , nurse and surgical staff

## 2018-02-28 NOTE — PROGRESS NOTE ADULT - SUBJECTIVE AND OBJECTIVE BOX
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 Admitted s/p Whipple procedure.     OVERNIGHT EVENTS:   VITALS:   	T(F): 99.8 (24 Feb 2018 00:02), Max: 100.9 (23 Feb 2018 05:17)  	HR: 94 (24 Feb 2018 00:02) (94 - 119)  	BP: 117/68 (24 Feb 2018 00:02) (116/68 - 142/62)  	RR: 18 (24 Feb 2018 00:02) (18 - 18)    BOWEL MOVEMENT: No  FLATUS: No    PHYSICAL EXAM: Young female sleeping, NAD. Abdomen soft and nondistended. Horizontal incision with overlying steris lightly saturated. intact. Other incisions with dressing c/d/i.     MEDICATIONS  (STANDING):  acetaminophen   Tablet. 650 milliGRAM(s) Oral every 6 hours  cefepime  IVPB      cefepime  IVPB 1000 milliGRAM(s) IV Intermittent every 12 hours  dextrose 5% + sodium chloride 0.9% with potassium chloride 20 mEq/L 1000 milliLiter(s) (100 mL/Hr) IV Continuous <Continuous>  enoxaparin Injectable 40 milliGRAM(s) SubCutaneous every 24 hours  gabapentin 200 milliGRAM(s) Oral three times a day  ondansetron Injectable 4 milliGRAM(s) IV Push every 4 hours  pantoprazole  Injectable 40 milliGRAM(s) IV Push every 24 hours  vancomycin  IVPB      vancomycin  IVPB 1000 milliGRAM(s) IV Intermittent every 12 hours    MEDICATIONS  (PRN):  acetaminophen  Suppository 650 milliGRAM(s) Rectal every 6 hours PRN For Temp greater than 38.5 C (101.3 F)  morphine  - Injectable 4 milliGRAM(s) IV Push every 4 hours PRN Severe Pain (7 - 10)  morphine  - Injectable 2 milliGRAM(s) IV Push every 4 hours PRN Moderate Pain (4 - 6)      LABS:                        9.4    24.99 )-----------( 321      ( 23 Feb 2018 07:24 )             27.9     02-23    134<L>  |  106  |  <5<L>  ----------------------------<  121<H>  3.7   |  20  |  0.4<L>    Ca    8.0<L>      23 Feb 2018 07:24  Phos  2.4     02-23  Mg     2.0     02-23    TPro  5.3<L>  /  Alb  2.4<L>  /  TBili  1.6<H>  /  DBili  0.7<H>  /  AST  38  /  ALT  62<H>  /  AlkPhos  71  02-23      LIVER FUNCTIONS - ( 23 Feb 2018 07:24 )  Alb: 2.4 g/dL / Pro: 5.3 g/dL / ALK PHOS: 71 U/L / ALT: 62 U/L / AST: 38 U/L / GGT: x           Urinalysis Basic - ( 22 Feb 2018 21:31 )    Color: Dark Yellow / Appearance: Cloudy / SG: >=1.030 / pH: x  Gluc: x / Ketone: Negative  / Bili: Negative / Urobili: 1.0   Blood: x / Protein: 100 / Nitrite: Negative   Leuk Esterase: Small / RBC: >50 /HPF / WBC 1-2 /HPF   Sq Epi: x / Non Sq Epi: Few /HPF / Bacteria: x          CARDIAC MARKERS ( 22 Feb 2018 16:13 )  <0.02 ng/mL / x     / 1158 U/L / x     / 1.8 ng/mL        RADIOLOGY & ADDITIONAL STUDIES:   < from: CT Abdomen and Pelvis w/ Oral Cont (02.23.18 @ 15:04) >  1.  No evidence ofan aortic dissection or intramural hematoma.    2.  Since February 22, 2018, unchanged bibasilar right greater than left   atelectasis.    3.  Post Whipple procedure with stable postsurgical changes as above.    4.  Additional findings are unchanged on the short-term follow-up   examination.    < end of copied text >
Progress Note: General Surgery  Patient: SHIELA VERMA , 33y (1985)Female   MRN: [id number]  Location: [address]  Visit: 02-20-18 Inpatient  Date: 02-27-18 @ 03:50  Hospital Day: 8 , Post-op Day: 7    Procedure: S/P EX-LAP, WHIPPLE PROCEDURE & VENTRAL HERNIA REPAIR  Events over 24h: afebrile, pain controlled, patient ambulated w/o issue, R DIMITRY drain removed, L DIMITRY drain pulled 5cm and secured in place, tolerating diet, +flatus, -BM    Vitals: T(F): 99.9 (02-26-18 @ 23:22), Max: 99.9 (02-26-18 @ 23:22)  HR: 99 (02-26-18 @ 23:22)  BP: 112/68 (02-26-18 @ 23:22) (111/67 - 117/70)  RR: 18 (02-26-18 @ 23:22)    In:   02-26-18 @ 07:01  -  02-27-18 @ 03:50  --------------------------------------------------------  IN: 780 mL    Out:   02-26-18 @ 07:01  -  02-27-18 @ 03:50  --------------------------------------------------------  OUT: 66 mL    Net:   02-26-18 @ 07:01  -  02-27-18 @ 03:50  --------------------------------------------------------  NET: 714 mL    Voided Urine:   02-26-18 @ 07:01  -  02-27-18 @ 03:50  --------------------------------------------------------  OUT: 66 mL  Duval Catheter: no   Drains:   DIMITRY:   02-26-18 @ 07:01  -  02-27-18 @ 03:50  --------------------------------------------------------  OUT: 65 mL  serous fluid     Diet: Diet, Soft:   Supplement Feeding Modality:  Oral  Ensure Clear Cans or Servings Per Day:  4       Frequency:  Daily (02-26-18 @ 08:22)  IV Fluids: no , IVL    Physical Examination:  General Appearance: NAD, alert and cooperative  HEENT: NCAT, WNL  Heart: S1 and S2. No murmurs. Rhythm is regular  Lungs: Clear to auscultation BL without rales, rhonchi, wheezing or diminished breath sounds.  Abdomen:  Positive bowel sounds. Soft, nondistended, +yana-incisional tenderness   Skin: Warm/dry, Normal color, texture and turgor with no lesions or eruptions. No jaundice.   Incisions/Wounds: incisions clean, dressings dry and intact, no signs of infection    Medications:  acetaminophen   Tablet. 650 milliGRAM(s) Oral every 6 hours  acetaminophen  Suppository 650 milliGRAM(s) Rectal every 6 hours PRN For Temp greater than 38.5 C (101.3 F)  furosemide   Injectable 10 milliGRAM(s) IV Push daily  gabapentin 200 milliGRAM(s) Oral three times a day  ondansetron Injectable 4 milliGRAM(s) IV Push every 4 hours  oxyCODONE    5 mG/acetaminophen 325 mG 1 Tablet(s) Oral every 4 hours PRN Moderate Pain (4 - 6)  oxyCODONE    5 mG/acetaminophen 325 mG 2 Tablet(s) Oral every 4 hours PRN Severe Pain (7 - 10)    DVT Prophylaxis: enoxaparin Injectable 40 milliGRAM(s) SubCutaneous every 24 hours  GI Prophylaxis: pantoprazole  Injectable 40 milliGRAM(s) IV Push every 24 hours  Antibiotics: cefepime  IVPB      cefepime  IVPB 1000 milliGRAM(s) IV Intermittent every 12 hours  vancomycin  IVPB      vancomycin  IVPB 1000 milliGRAM(s) IV Intermittent every 12 hours  Anticoagulation: None    Labs:                        9.1    15.91 )-----------( 464      ( 26 Feb 2018 07:03 )             26.9   02-26    133<L>  |  98  |  9<L>  ----------------------------<  80  4.6   |  26  |  0.4<L>    Ca    8.5      26 Feb 2018 18:12  Phos  4.2     02-26  Mg     2.0     02-26    TPro  6.0  /  Alb  2.5<L>  /  TBili  0.7  /  DBili  0.3<H>  /  AST  33  /  ALT  67<H>  /  AlkPhos  196<H>  02-26    Imaging:  None
GENERAL SURGERY PROGRESS NOTE    Patient: SHIELA VERMA , 33y ([birth year])Female   MRN: [id number]  [address]  Visit: 18 Inpatient  Date: 18 @ 05:09    Hospital Day #: 7  Post-Op Day #: 6    Procedure/Dx/Injuries: S/P EX-LAP, WHIPPLE PROCEDURE & VENTRAL HERNIA REPAIR  Events of past 24 hours: + fever of 101.5@ 00:27    PAST MEDICAL & SURGICAL HISTORY:  H/O gastroesophageal reflux (GERD)  No significant past surgical history    Vitals: T(F): 101.5 (18 @ 00:27), Max: 101.5 (18 @ 00:27)  HR: 100 (18 @ 00:27)  BP: 129/78 (18 @ 00:27)  RR: 18 (18 @ 00:27)  SpO2: --    Pain (0-10):            Pain Control Adequate: [] YES [] N    Diet, Full Liquid    2018 07:01  -  2018 05:11  --------------------------------------------------------  IN:    Oral Fluid: 500 mL  Total IN: 500 mL    OUT:    Bulb: 20 mL    Bulb: 20 mL  Total OUT: 40 mL      Bowel Movement: : [x] YES [] NO  Flatus: : [x] YES [] NO    PHYSICAL EXAM  GEN: NAD  CV/LUNG: RRR, CTAB  ABD: SOFT, NONDISTENDED, MILD TENDERNESS @ INCISION SITES, NO REBOUND, NO GUARDING  INCISION: C/D/I    MEDICATIONS  (STANDING):  acetaminophen   Tablet. 650 milliGRAM(s) Oral every 6 hours  cefepime  IVPB      cefepime  IVPB 1000 milliGRAM(s) IV Intermittent every 12 hours  enoxaparin Injectable 40 milliGRAM(s) SubCutaneous every 24 hours  furosemide   Injectable 10 milliGRAM(s) IV Push once  gabapentin 200 milliGRAM(s) Oral three times a day  ondansetron Injectable 4 milliGRAM(s) IV Push every 4 hours  pantoprazole  Injectable 40 milliGRAM(s) IV Push every 24 hours  vancomycin  IVPB      vancomycin  IVPB 1000 milliGRAM(s) IV Intermittent every 12 hours    MEDICATIONS  (PRN):  acetaminophen  Suppository 650 milliGRAM(s) Rectal every 6 hours PRN For Temp greater than 38.5 C (101.3 F)  oxyCODONE    5 mG/acetaminophen 325 mG 1 Tablet(s) Oral every 4 hours PRN Moderate Pain (4 - 6)  oxyCODONE    5 mG/acetaminophen 325 mG 2 Tablet(s) Oral every 4 hours PRN Severe Pain (7 - 10)      DVT PROPHYLAXIS: [x] YES [] NO   GI PROPHYLAXIS: [x] YES [] NO   ANTICOAGULATION: [] YES [x] NO   ANTIBIOTICS: [x] YES [] NO cefepime  IVPB / vancomycin  IVPB    LAB/STUDIES:             9.1    17.43 )-----------( 432      ( 2018 13:45 )             27.2     02-    130<L>  |  97<L>  |  5<L>  ----------------------------<  88  3.5   |  23  |  0.4<L>    Ca    7.8<L>      2018 13:45  Phos  2.7     -  Mg     2.0         TPro  5.4<L>  /  Alb  2.3<L>  /  TBili  1.2  /  DBili  0.5<H>  /  AST  31  /  ALT  55<H>  /  AlkPhos  92  -               x    | x    | x        ------------------[x       ( 2018 13:45 )  x    | x    | x           Lipase:x      Amylase:90         Urinalysis Basic - ( 2018 13:20 )    Color: Yellow / Appearance: Cloudy / S.015 / pH: x  Gluc: x / Ketone: Negative  / Bili: Negative / Urobili: 1.0 mg/dL   Blood: x / Protein: Trace mg/dL / Nitrite: Negative   Leuk Esterase: Negative / RBC: 5-10 /HPF / WBC x   Sq Epi: x / Non Sq Epi: Few /HPF / Bacteria: Few /HPF
GENERAL SURGERY PROGRESS NOTE    Patient: SHIELA VERMA , 33y ([birth year])Female   MRN: [id number]  Location: [address]  Visit: 18 Inpatient  Date: 18 @ 02:28    Hospital Day #: 5  Post-Op Day #: 4    Procedure/Dx/Injuries: S/P EX-LAP, WHIPPLE PROCEDURE & VENTRAL HERNIA REPAIR  Events of past 24 hours: + FEVER 100.8 (18 @ 19:30)    PAST MEDICAL & SURGICAL HISTORY:  H/O gastroesophageal reflux (GERD)  No significant past surgical history    Vitals: T(F): 99.7 (18 @ 23:53), Max: 100.8 (18 @ 19:30)  HR: 83 (18 @ 23:53)  BP: 121/74 (18 @ 23:53)  RR: 18 (18 @ 23:53)  SpO2: --    Pain (0-10):            Pain Control Adequate: [x] YES [] N    Diet, Clear Liquid    18 @ 07:01  -  18 @ 07:00  --------------------------------------------------------  OUT:    Bulb: 40 mL    Bulb: 20 mL    Voided: 900 mL  Total OUT: 960 mL    Bowel Movement: : [x] YES [] NO  Flatus: : [x] YES [] NO    PHYSICAL EXAM  GEN: NAD  CV/LUNG: RRR, CTAB  ABD: SOFT, NONDISTENDED, MILD TENDERNESS @ INCISION SITES, NO REBOUND, NO GUARDING  INCISION: C/D/I    MEDICATIONS  (STANDING):  acetaminophen   Tablet. 650 milliGRAM(s) Oral every 6 hours  cefepime  IVPB      cefepime  IVPB 1000 milliGRAM(s) IV Intermittent every 12 hours  dextrose 5% + sodium chloride 0.9% with potassium chloride 20 mEq/L 1000 milliLiter(s) (125 mL/Hr) IV Continuous <Continuous>  enoxaparin Injectable 40 milliGRAM(s) SubCutaneous every 24 hours  gabapentin 200 milliGRAM(s) Oral three times a day  ondansetron Injectable 4 milliGRAM(s) IV Push every 4 hours  pantoprazole  Injectable 40 milliGRAM(s) IV Push every 24 hours  vancomycin  IVPB      vancomycin  IVPB 1000 milliGRAM(s) IV Intermittent every 12 hours    MEDICATIONS  (PRN):  acetaminophen  Suppository 650 milliGRAM(s) Rectal every 6 hours PRN For Temp greater than 38.5 C (101.3 F)  morphine  - Injectable 4 milliGRAM(s) IV Push every 4 hours PRN Severe Pain (7 - 10)  morphine  - Injectable 2 milliGRAM(s) IV Push every 4 hours PRN Moderate Pain (4 - 6)    DVT PROPHYLAXIS: [x] YES [] NO   GI PROPHYLAXIS: [x] YES [] NO   ANTICOAGULATION: [] YES [x] NO   ANTIBIOTICS: [x] YES [] NO cefepime  IVPB / vancomycin  IVPB      LAB/STUDIES:             8.5    20.12 )-----------( 330      ( 2018 06:37 )             25.2     -    134<L>  |  104  |  <5<L>  ----------------------------<  62<L>  3.7   |  22  |  0.4<L>    Ca    7.9<L>      2018 06:37  Phos  3.0     -  Mg     1.9         TPro  5.4<L>  /  Alb  2.3<L>  /  TBili  1.2  /  DBili  0.5<H>  /  AST  31  /  ALT  55<H>  /  AlkPhos  92                 5.4  | 1.2  | 31       ------------------[92      ( 2018 06:37 )  2.3  | 0.5  | 55          Lipase:x      Amylase:113        Urinalysis Basic - ( 2018 13:20 )    Color: Yellow / Appearance: Cloudy / S.015 / pH: x  Gluc: x / Ketone: Negative  / Bili: Negative / Urobili: 1.0 mg/dL   Blood: x / Protein: Trace mg/dL / Nitrite: Negative   Leuk Esterase: Negative / RBC: 5-10 /HPF / WBC x   Sq Epi: x / Non Sq Epi: Few /HPF / Bacteria: Few /HPF
POST-OP CHECK    PROCEDURE: S/P EX-LAP, WHIPPLE PROCEDURE & VENTRAL HERNIA REPAIR    S: Pt awake and alert resting comfortaby in bed. Pain controlled. Pt denies N/V, SOB, CP, palpitations.     O:   Vital Signs Last 24 Hrs  T(C): 37.2 (20 Feb 2018 21:30), Max: 37.2 (20 Feb 2018 21:30)  T(F): 98.9 (20 Feb 2018 21:30), Max: 98.9 (20 Feb 2018 21:30)  HR: 95 (20 Feb 2018 21:30) (74 - 98)  BP: 123/69 (20 Feb 2018 21:30) (101/84 - 141/87)  BP(mean): --  RR: 18 (20 Feb 2018 21:30) (10 - 20)  SpO2: 97% (20 Feb 2018 20:59) (96% - 100%)  20 Feb 2018 07:01  -  21 Feb 2018 03:49  --------------------------------------------------------  IN: 200 mL / OUT: 295 mL / NET: -95 mL      Diet, NPO    PHYSICAL EXAM:    GEN: NAD  ABD: SOFT, NON DISTENDED, TENDER @ INCISIONS. DRESSING C/D/I. NO BLEEDS, NO HEMATOMAS. DIMITRY's X 2 W/ SEROSANGUINOUS FLUID   INC: C/D/I    MEDICATIONS  (STANDING):  acetaminophen  IVPB. 1000 milliGRAM(s) IV Intermittent Once  acetaminophen  IVPB. 1000 milliGRAM(s) IV Intermittent Once  enoxaparin Injectable 40 milliGRAM(s) SubCutaneous every 24 hours  lactated ringers. 1000 milliLiter(s) (100 mL/Hr) IV Continuous <Continuous>  morphine  - Injectable 2 milliGRAM(s) IV Push every 4 hours  ondansetron Injectable 4 milliGRAM(s) IV Push every 4 hours  pantoprazole  Injectable 40 milliGRAM(s) IV Push every 24 hours    MEDICATIONS  (PRN):      LABS:                        11.8   16.50 )-----------( 269      ( 20 Feb 2018 19:27 )             34.3     02-20    136  |  106  |  5<L>  ----------------------------<  166<H>  4.1   |  21  |  0.6<L>    Ca    7.7<L>      20 Feb 2018 19:27    TPro  5.7<L>  /  Alb  3.1  /  TBili  1.9<H>  /  DBili  1.1<H>  /  AST  168<H>  /  ALT  183<H>  /  AlkPhos  77  02-20    LIVER FUNCTIONS - ( 20 Feb 2018 19:27 )  Alb: 3.1 g/dL / Pro: 5.7 g/dL / ALK PHOS: 77 U/L / ALT: 183 U/L / AST: 168 U/L / GGT: x           PT/INR - ( 20 Feb 2018 19:27 )   PT: 12.10 sec;   INR: 1.12 ratio         PTT - ( 20 Feb 2018 19:27 )  PTT:25.7 sec
Progress Note: General Surgery  Patient: SHIELA VERMA , 33y (1985)Female   MRN: [id number]  Location: [address]  Visit: 18 Inpatient  Date: 18 @ 04:51  Hospital Day: 4 , Post-op Day: 3    Procedure: S/P EX-LAP, WHIPPLE PROCEDURE & VENTRAL HERNIA REPAIR  Events over 24h: elevated WBC, Tmax 101.2, made NPO, started Unasyn, had CTA chest and CT A/P w/ IV and PO contrast, pain controlled, c/o mild lower back pain, able to ambulate multiple times around unit, +flatus, -BM, DIMITRY drains and serum amylase sent, labs repleted    Vitals: T(F): 99.7 (18 @ 00:00), Max: 101.2 (18 @ 21:06)  HR: 125 (18 @ 00:00)  BP: 137/87 (18 @ 00:00) (118/78 - 137/87)  RR: 20 (18 @ 00:00)  SpO2: 97% (18 @ 21:06)    In:   18 @ 07:01  -  18 @ 04:51  --------------------------------------------------------  IN: 2340 mL      Out:   18 @ 07:01  -  18 @ 04:51  --------------------------------------------------------  OUT: 650 mL      Net:   18 @ 07:01  -  18 @ 04:51  --------------------------------------------------------  NET: 1690 mL    Voided Urine:   18 @ 07:01  -  18 @ 04:51  --------------------------------------------------------  OUT: 650 mL  Duval Catheter: no   Drains:   DIMITRY:   18 @ 07:01  -  18 @ 04:51  --------------------------------------------------------  OUT: 150 mL  R DIMITRY: 30  L DIMITRY: 120  serosanguionous    Diet: Diet, Clear Liquid:   Bariatric Clear Liquid (BARICLLIQ)  Supplement Feeding Modality:  Oral  Ensure Clear Cans or Servings Per Day:  1       Frequency:  Daily (18 @ 09:34)  Diet, NPO (18 @ 12:34)  IV Fluids: yes , Type: dextrose 5% + sodium chloride 0.9% with potassium chloride 20 mEq/L 1000 milliLiter(s) (100 mL/Hr) IV Continuous <Continuous>    Physical Examination:  General Appearance: NAD, alert and cooperative  HEENT: NCAT,  WNL  Heart: S1 and S2. No murmurs. Rhythm is regular  Lungs: Clear to auscultation BL without rales, rhonchi, wheezing or diminished breath sounds.  Abdomen:  Positive bowel sounds. Soft, nondistended, +yana-incisional tenderness  MSK/Extremities: ROM intact BL upper/lower extremities.   Neuro: Grossly intact.  Skin: Warm/dry, Normal color, texture and turgor with no lesions or eruptions. No jaundice.   Incisions/Wounds: Dressings in place, clean, dry and intact, no signs of infection    Medications:  acetaminophen   Tablet. 650 milliGRAM(s) Oral every 6 hours  acetaminophen  Suppository 650 milliGRAM(s) Rectal every 6 hours PRN For Temp greater than 38.5 C (101.3 F)  gabapentin 200 milliGRAM(s) Oral three times a day  morphine  - Injectable 4 milliGRAM(s) IV Push every 4 hours PRN Severe Pain (7 - 10)  morphine  - Injectable 2 milliGRAM(s) IV Push every 4 hours PRN Moderate Pain (4 - 6)  ondansetron Injectable 4 milliGRAM(s) IV Push every 4 hours    DVT Prophylaxis: enoxaparin Injectable 40 milliGRAM(s) SubCutaneous every 24 hours  GI Prophylaxis: pantoprazole  Injectable 40 milliGRAM(s) IV Push every 24 hours  Antibiotics: ampicillin/sulbactam  IVPB      ampicillin/sulbactam  IVPB 3 Gram(s) IV Intermittent every 6 hours  Anticoagulation: none    Labs:  CBC:          9.7<L>  24.21<H> >------< 295          28.6<L>  BMP:  131<L>|103|7<L>  --------------------< 155<H>  3.4<L>|22|0.5<L>  LFT:  5.2<L>|1.3<H>|48<H>  -------------------- 63  2.8<L>|0.5<H>|80<H>  Ca:7.9<L>  M.9  PO4:1.9<L>    OTHER Labs:    Trop:<0.02  CKMB:--  Amylase:--  Lipase:--  LA:--  CBC:          10.3<L>  24.13<H> >------< 321          30.8<L>  BMP:  133<L>|104|6<L>  --------------------< 144<H>  3.8|23|0.7  LFT:  5.4<L>|1.3<H>|54<H>  -------------------- 63  2.7<L>|0.5<H>|88<H>  Ca:8.5  M.9  PO4:1.9<L>    Imaging:  EXAM:  CT CHEST IC        PROCEDURE DATE:  2018    IMPRESSION:   No central or lobar pulmonary embolism.  Bilateral lower lobe, right greater than left, parenchymal consolidative   opacities with air bronchogram compatible with infection/inflammatory   process. With a history of recent surgery, this could also be compatible   with atelectasis. Correlate clinically.    EXAM:  CT ABDOMEN AND PELVIS OC IC        PROCEDURE DATE:  2018    IMPRESSION:      Post Whipple procedure with postsurgical changes.   A collection of fluid is seen measuring 5 x 2 cm,  in the area of   resection bed.  Evaluation of anastomotic leak is limited without oral contrast.
[facility] F4-4B 0[address]  SHIELA VERMA  33yFemale  [id number]    SURGICAL INTERN PROGRESS NOTE  HPI: 33F s/p [facility] DAY:   POST OPERATIVE DAY #:   STATUS POST:   OVERNIGHT EVENTS:   VITALS:  TMax: 99.8 (27 Feb 2018 23:50)  	HR: 73 (28 Feb 2018 07:20) (73 - 99)  	BP: 117/68 (28 Feb 2018 07:20) (109/60 - 133/74)  	RR: 18 (28 Feb 2018 05:00) (16 - 18)  	SpO2: 97% (28 Feb 2018 05:00) (97% - 97%)  BOWEL MOVEMENT: No  FLATUS: Yes    PHYSICAL EXAM: Young female sitting in chair, NAD. Abdomen is soft and nondistended, steri-strips covering transverse incision, C/D/I.      MEDICATIONS  (STANDING):  acetaminophen   Tablet. 650 milliGRAM(s) Oral every 6 hours  cefepime  IVPB      cefepime  IVPB 1000 milliGRAM(s) IV Intermittent every 12 hours  enoxaparin Injectable 40 milliGRAM(s) SubCutaneous every 24 hours  furosemide   Injectable 10 milliGRAM(s) IV Push daily  gabapentin 200 milliGRAM(s) Oral three times a day  ondansetron Injectable 4 milliGRAM(s) IV Push every 4 hours  pantoprazole  Injectable 40 milliGRAM(s) IV Push every 24 hours  vancomycin  IVPB      vancomycin  IVPB 1000 milliGRAM(s) IV Intermittent every 12 hours    MEDICATIONS  (PRN):  acetaminophen  Suppository 650 milliGRAM(s) Rectal every 6 hours PRN For Temp greater than 38.5 C (101.3 F)  oxyCODONE    5 mG/acetaminophen 325 mG 1 Tablet(s) Oral every 4 hours PRN Moderate Pain (4 - 6)  oxyCODONE    5 mG/acetaminophen 325 mG 2 Tablet(s) Oral every 4 hours PRN Severe Pain (7 - 10)      LABS:                        9.5    12.97 )-----------( 589      ( 28 Feb 2018 08:44 )             28.2     02-27    131<L>  |  97<L>  |  6<L>  ----------------------------<  119<H>  3.8   |  25  |  0.4<L>    Ca    8.4<L>      27 Feb 2018 06:36  Phos  4.3     02-27  Mg     1.9     02-27    TPro  6.2  /  Alb  2.5<L>  /  TBili  0.9  /  DBili  0.3<H>  /  AST  29  /  ALT  61<H>  /  AlkPhos  199<H>  02-27      LIVER FUNCTIONS - ( 27 Feb 2018 06:36 )  Alb: 2.5 g/dL / Pro: 6.2 g/dL / ALK PHOS: 199 U/L / ALT: 61 U/L / AST: 29 U/L / GGT: x
[facility] F4-4B 015 B  SHIELA VERMA  33yFemale  [id number]    SURGICAL INTERN PROGRESS NOTE  HPI: 33F with solid pseudopapillary neoplasm admitted s/p whipple procedure     HOSPITAL DAY: 3  POST OPERATIVE DAY #: 3  STATUS POST: Ex-lap, Whipple procedure and ventral hernia repair  OVERNIGHT EVENTS: No acute events overnight. Patient ambulated, passed TOV yesterday. Pain controlled with gabapentin, tylenol OTC, morphine.   VITALS: Tmax 100.6 (21 Feb 2018 23:00)  	HR: 113 (21 Feb 2018 23:00) (90 - 118)  	BP: 136/62 (21 Feb 2018 23:00) (123/67 - 139/76)  	BP(mean): 64 (21 Feb 2018 07:26) (64 - 64)  	RR: 18 (21 Feb 2018 23:00) (18 - 18)    BOWEL MOVEMENT: No  FLATUS: No    PHYSICAL EXAM: Young female in NAD. Abdomen with epigastric incision dressed with gauze and tegaderm, intact. diffuse tenderness to palpation without guarding or rigidity. Nondistended.     MEDICATIONS  (STANDING):  acetaminophen   Tablet. 650 milliGRAM(s) Oral every 6 hours  enoxaparin Injectable 40 milliGRAM(s) SubCutaneous every 24 hours  gabapentin 200 milliGRAM(s) Oral three times a day  lactated ringers. 1000 milliLiter(s) (100 mL/Hr) IV Continuous <Continuous>  ondansetron Injectable 4 milliGRAM(s) IV Push every 4 hours  pantoprazole  Injectable 40 milliGRAM(s) IV Push every 24 hours    MEDICATIONS  (PRN):  morphine  - Injectable 4 milliGRAM(s) IV Push every 4 hours PRN Severe Pain (7 - 10)  morphine  - Injectable 2 milliGRAM(s) IV Push every 4 hours PRN Moderate Pain (4 - 6)      LABS:                        10.9   14.73 )-----------( 322      ( 21 Feb 2018 06:12 )             32.4     02-21    135  |  105  |  5<L>  ----------------------------<  138<H>  3.9   |  23  |  0.6<L>    Ca    7.8<L>      21 Feb 2018 06:12    TPro  5.4<L>  /  Alb  2.9<L>  /  TBili  1.3<H>  /  DBili  0.5<H>  /  AST  125<H>  /  ALT  152<H>  /  AlkPhos  68  02-21    PT/INR - ( 20 Feb 2018 19:27 )   PT: 12.10 sec;   INR: 1.12 ratio         PTT - ( 20 Feb 2018 19:27 )  PTT:25.7 sec  LIVER FUNCTIONS - ( 21 Feb 2018 06:12 )  Alb: 2.9 g/dL / Pro: 5.4 g/dL / ALK PHOS: 68 U/L / ALT: 152 U/L / AST: 125 U/L / GGT: x
[facility] [id number] [address]  SHIELA VERMA  33yFemale  [id number]    SURGICAL INTERN PROGRESS NOTE  HPI:33F admitted s/p ex-lap, whipple procedure and ventral hernia repair   HOSPITAL DAY: 2  POST OPERATIVE DAY #: 1  STATUS POST:   OVERNIGHT EVENTS:   VITALS: TMax: 100.1 (21 Feb 2018 07:26)  	HR: 90 (21 Feb 2018 07:26) (78 - 98)  	BP: 130/- (21 Feb 2018 07:26) (103/85 - 141/87)  	RR: 18 (21 Feb 2018 07:26) (10 - 20)  	SpO2: 97% (20 Feb 2018 20:59) (96% - 100%)  I/O:I&O's: Dimitry #1- 55, DIMITRY#2- 70, NGT- 20, Duval- 210  BOWEL MOVEMENT: No  FLATUS: No    PHYSICAL EXAM: Young female sleeping, NAD. Incisions with gauze and tegaderm C/D/I. 2 DIMITRY drains present. Abdomen nondistended, tender.     MEDICATIONS  (STANDING):  acetaminophen  IVPB. 1000 milliGRAM(s) IV Intermittent Once  acetaminophen  IVPB. 1000 milliGRAM(s) IV Intermittent Once  enoxaparin Injectable 40 milliGRAM(s) SubCutaneous every 24 hours  lactated ringers. 1000 milliLiter(s) (100 mL/Hr) IV Continuous <Continuous>  morphine  - Injectable 2 milliGRAM(s) IV Push every 4 hours  ondansetron Injectable 4 milliGRAM(s) IV Push every 4 hours  pantoprazole  Injectable 40 milliGRAM(s) IV Push every 24 hours    MEDICATIONS  (PRN): Young female in NAD.     LABS:                        11.8   16.50 )-----------( 269      ( 20 Feb 2018 19:27 )             34.3     02-20    136  |  106  |  5<L>  ----------------------------<  166<H>  4.1   |  21  |  0.6<L>    Ca    7.7<L>      20 Feb 2018 19:27    TPro  5.7<L>  /  Alb  3.1  /  TBili  1.9<H>  /  DBili  1.1<H>  /  AST  168<H>  /  ALT  183<H>  /  AlkPhos  77  02-20    PT/INR - ( 20 Feb 2018 19:27 )   PT: 12.10 sec;   INR: 1.12 ratio         PTT - ( 20 Feb 2018 19:27 )  PTT:25.7 sec  LIVER FUNCTIONS - ( 20 Feb 2018 19:27 )  Alb: 3.1 g/dL / Pro: 5.7 g/dL / ALK PHOS: 77 U/L / ALT: 183 U/L / AST: 168 U/L / GGT: x                       RADIOLOGY & ADDITIONAL STUDIES:

## 2018-02-28 NOTE — PROGRESS NOTE ADULT - PROBLEM SELECTOR PROBLEM 1
Solid pseudopapillary carcinoma

## 2018-02-28 NOTE — PROGRESS NOTE ADULT - ATTENDING COMMENTS
First CT reviewed - Abdomen - post operative changes , Chest - possible Aortic dissection. There fore ordered 2nd CT no aortic dissection and oral contrast all the way down to the colon.  Echo: no aortic dissection.   Vitals: Tachycardia getting better.   Drain amylase sent: reviewed - will continue with DIMITRY drain for now.   Leukocystosis : still elevated . Possible infection , possible from abdomen vs pnuemonia on CT : Will change ABX from Unasyn to Vanco/Cefepime. Will repeat Labs.   Continue NPO -
Patient feels much stronger.   ambulating.  tolerating liquid diet.   advance diet.   awaiting labs.   DC iv fluids  Continue ABX
Pt gradually getting better.   S/p Pancreaticoduodenectomy.   Possible Pneumonia improving.   Right DIMITRY drain removed .   Left DIMITRY drain serous fluid.   Vitals stable except low grade temp of 99.  WBC 15  on ABX.  Patient tolerating diet and ambulating.       Pathology discussed with the patient.  Awaiting normalization of WBC before discharging home on ABX.
Stable. Febrile to 101.5 last night.  Cultures pending.    Tolerated fulls.  Having BM and passing flatus.  NO N/V for 3 days.    Continue antibiotics.  Advance diet as tolerated.
Patient s/p pancreaticoduodenectomy.   DIMITRY drain sero sanguinous.   Pt not nauseous.   Had small amount vomiting yesterday.   Pain more in back than in incision.   Voided urine.  LFT improving.     Continue sips of clears.   Continue physical therapy and pain control   follow labs.
Pt doing well.   DC home.   Follow up with Dr. Hudson in a week.
S/P pancreaticoduodenectomy.   Doing well post op.   DC NG tube   DC Duval.   Incentive Spirometer.   DIMITRY sero sanguinous.  Ambulate
Pt feels much better.   She feels stronger.   No nausea.   She had some coughing spells yesterday when she vomited bilious contents.  But she feels much better.   She has  passed flatus.  She has ambulated.    Will continue with abx.  Will start clear liquid diet .   Aggressive Chest PT.  Continue drain monitoring.   WBC shows improvement

## 2018-02-28 NOTE — PROGRESS NOTE ADULT - PROBLEM SELECTOR PLAN 1
s/p whipple procedure  currently not tachycardia, afebrile  currently NPO, possibly advance diet today  f/u AM labs, patient continues to have leukocytosis. f/u Cr as well. Patient received contrast x2.
[facility]  [facility] planning  DIMITRY education  VNS prepared
s/p Rebecca  Passed TOV, ambulated  Had sips and chips yesterday  switch to maintenance fluids  advance diet to clears   pain control  LMWH/PTX
s/p Whipple procedure  Pain control  LMWH/PTX  NPO, IVF

## 2018-03-02 DIAGNOSIS — K43.6 OTHER AND UNSPECIFIED VENTRAL HERNIA WITH OBSTRUCTION, WITHOUT GANGRENE: ICD-10-CM

## 2018-03-02 DIAGNOSIS — J18.9 PNEUMONIA, UNSPECIFIED ORGANISM: ICD-10-CM

## 2018-03-02 DIAGNOSIS — C25.0 MALIGNANT NEOPLASM OF HEAD OF PANCREAS: ICD-10-CM

## 2018-03-02 DIAGNOSIS — D13.6 BENIGN NEOPLASM OF PANCREAS: ICD-10-CM

## 2018-03-05 ENCOUNTER — APPOINTMENT (OUTPATIENT)
Dept: SURGERY | Facility: CLINIC | Age: 33
End: 2018-03-05
Payer: COMMERCIAL

## 2018-03-05 VITALS
DIASTOLIC BLOOD PRESSURE: 70 MMHG | HEIGHT: 64 IN | WEIGHT: 153 LBS | BODY MASS INDEX: 26.12 KG/M2 | TEMPERATURE: 98.5 F | SYSTOLIC BLOOD PRESSURE: 112 MMHG | HEART RATE: 94 BPM

## 2018-03-05 DIAGNOSIS — K21.9 GASTRO-ESOPHAGEAL REFLUX DISEASE WITHOUT ESOPHAGITIS: ICD-10-CM

## 2018-03-05 DIAGNOSIS — K86.89 OTHER SPECIFIED DISEASES OF PANCREAS: ICD-10-CM

## 2018-03-05 PROCEDURE — 99024 POSTOP FOLLOW-UP VISIT: CPT

## 2018-03-09 ENCOUNTER — APPOINTMENT (OUTPATIENT)
Dept: NUTRITION | Facility: CLINIC | Age: 33
End: 2018-03-09

## 2018-03-09 VITALS — BODY MASS INDEX: 25.58 KG/M2 | WEIGHT: 149 LBS

## 2018-03-10 ENCOUNTER — INPATIENT (INPATIENT)
Facility: HOSPITAL | Age: 33
LOS: 3 days | Discharge: HOME | End: 2018-03-14
Attending: SURGERY | Admitting: INTERNAL MEDICINE

## 2018-03-10 VITALS
TEMPERATURE: 99 F | SYSTOLIC BLOOD PRESSURE: 110 MMHG | OXYGEN SATURATION: 100 % | DIASTOLIC BLOOD PRESSURE: 75 MMHG | RESPIRATION RATE: 18 BRPM | HEART RATE: 116 BPM

## 2018-03-10 DIAGNOSIS — Z85.07 PERSONAL HISTORY OF MALIGNANT NEOPLASM OF PANCREAS: Chronic | ICD-10-CM

## 2018-03-10 LAB
ALBUMIN SERPL ELPH-MCNC: 2.6 G/DL — LOW (ref 3–5.5)
ALBUMIN SERPL ELPH-MCNC: 2.8 G/DL — LOW (ref 3–5.5)
ALP SERPL-CCNC: 125 U/L — HIGH (ref 30–115)
ALP SERPL-CCNC: 146 U/L — HIGH (ref 30–115)
ALT FLD-CCNC: 40 U/L — SIGNIFICANT CHANGE UP (ref 0–41)
ALT FLD-CCNC: 42 U/L — HIGH (ref 0–41)
ANION GAP SERPL CALC-SCNC: 12 MMOL/L — SIGNIFICANT CHANGE UP (ref 7–14)
ANION GAP SERPL CALC-SCNC: 13 MMOL/L — SIGNIFICANT CHANGE UP (ref 7–14)
APPEARANCE UR: (no result)
APTT BLD: 33.2 SEC — SIGNIFICANT CHANGE UP (ref 27–39.2)
AST SERPL-CCNC: 31 U/L — SIGNIFICANT CHANGE UP (ref 0–41)
AST SERPL-CCNC: 62 U/L — HIGH (ref 0–41)
BACTERIA # UR AUTO: (no result) /HPF
BASOPHILS # BLD AUTO: 0.04 K/UL — SIGNIFICANT CHANGE UP (ref 0–0.2)
BASOPHILS NFR BLD AUTO: 0.3 % — SIGNIFICANT CHANGE UP (ref 0–1)
BILIRUB SERPL-MCNC: 0.5 MG/DL — SIGNIFICANT CHANGE UP (ref 0.2–1.2)
BILIRUB SERPL-MCNC: 3.6 MG/DL — HIGH (ref 0.2–1.2)
BILIRUB UR-MCNC: NEGATIVE — SIGNIFICANT CHANGE UP
BUN SERPL-MCNC: 11 MG/DL — SIGNIFICANT CHANGE UP (ref 10–20)
BUN SERPL-MCNC: 8 MG/DL — LOW (ref 10–20)
CALCIUM SERPL-MCNC: 8.8 MG/DL — SIGNIFICANT CHANGE UP (ref 8.5–10.1)
CALCIUM SERPL-MCNC: 8.8 MG/DL — SIGNIFICANT CHANGE UP (ref 8.5–10.1)
CHLORIDE SERPL-SCNC: 101 MMOL/L — SIGNIFICANT CHANGE UP (ref 98–110)
CHLORIDE SERPL-SCNC: 96 MMOL/L — LOW (ref 98–110)
CO2 SERPL-SCNC: 21 MMOL/L — SIGNIFICANT CHANGE UP (ref 17–32)
CO2 SERPL-SCNC: 23 MMOL/L — SIGNIFICANT CHANGE UP (ref 17–32)
COLOR SPEC: YELLOW — SIGNIFICANT CHANGE UP
CREAT SERPL-MCNC: 0.5 MG/DL — LOW (ref 0.7–1.5)
CREAT SERPL-MCNC: 0.5 MG/DL — LOW (ref 0.7–1.5)
DIFF PNL FLD: (no result)
EOSINOPHIL # BLD AUTO: 0.21 K/UL — SIGNIFICANT CHANGE UP (ref 0–0.7)
EOSINOPHIL NFR BLD AUTO: 1.5 % — SIGNIFICANT CHANGE UP (ref 0–8)
EPI CELLS # UR: (no result) /HPF
GLUCOSE SERPL-MCNC: 114 MG/DL — HIGH (ref 70–110)
GLUCOSE SERPL-MCNC: 93 MG/DL — SIGNIFICANT CHANGE UP (ref 70–110)
GLUCOSE UR QL: NEGATIVE MG/DL — SIGNIFICANT CHANGE UP
HCT VFR BLD CALC: 30.7 % — LOW (ref 37–47)
HGB BLD-MCNC: 10.3 G/DL — LOW (ref 12–16)
HIV 1 & 2 AB SERPL IA.RAPID: SIGNIFICANT CHANGE UP
IMM GRANULOCYTES NFR BLD AUTO: 0.5 % — HIGH (ref 0.1–0.3)
INR BLD: 1.51 RATIO — HIGH (ref 0.65–1.3)
KETONES UR-MCNC: 15
LACTATE SERPL-SCNC: 0.6 MMOL/L — SIGNIFICANT CHANGE UP (ref 0.5–2.2)
LEUKOCYTE ESTERASE UR-ACNC: (no result)
LIDOCAIN IGE QN: 46 U/L — SIGNIFICANT CHANGE UP (ref 7–60)
LYMPHOCYTES # BLD AUTO: 1.68 K/UL — SIGNIFICANT CHANGE UP (ref 1.2–3.4)
LYMPHOCYTES # BLD AUTO: 11.6 % — LOW (ref 20.5–51.1)
MAGNESIUM SERPL-MCNC: 2.2 MG/DL — SIGNIFICANT CHANGE UP (ref 1.8–2.4)
MCHC RBC-ENTMCNC: 28.4 PG — SIGNIFICANT CHANGE UP (ref 27–31)
MCHC RBC-ENTMCNC: 33.6 G/DL — SIGNIFICANT CHANGE UP (ref 32–37)
MCV RBC AUTO: 84.6 FL — SIGNIFICANT CHANGE UP (ref 81–99)
MONOCYTES # BLD AUTO: 1.4 K/UL — HIGH (ref 0.1–0.6)
MONOCYTES NFR BLD AUTO: 9.7 % — HIGH (ref 1.7–9.3)
NEUTROPHILS # BLD AUTO: 11.07 K/UL — HIGH (ref 1.4–6.5)
NEUTROPHILS NFR BLD AUTO: 76.4 % — HIGH (ref 42.2–75.2)
NITRITE UR-MCNC: NEGATIVE — SIGNIFICANT CHANGE UP
NRBC # BLD: 0 /100 WBCS — SIGNIFICANT CHANGE UP (ref 0–0)
PH UR: 6 — SIGNIFICANT CHANGE UP (ref 5–8)
PLATELET # BLD AUTO: 620 K/UL — HIGH (ref 130–400)
POTASSIUM SERPL-MCNC: 4.1 MMOL/L — SIGNIFICANT CHANGE UP (ref 3.5–5)
POTASSIUM SERPL-MCNC: 7.8 MMOL/L — CRITICAL HIGH (ref 3.5–5)
POTASSIUM SERPL-SCNC: 4.1 MMOL/L — SIGNIFICANT CHANGE UP (ref 3.5–5)
POTASSIUM SERPL-SCNC: 7.8 MMOL/L — CRITICAL HIGH (ref 3.5–5)
PROT SERPL-MCNC: 6.3 G/DL — SIGNIFICANT CHANGE UP (ref 6–8)
PROT SERPL-MCNC: SIGNIFICANT CHANGE UP G/DL (ref 6–8)
PROT UR-MCNC: (no result) MG/DL
PROTHROM AB SERPL-ACNC: 16.5 SEC — HIGH (ref 9.95–12.87)
RBC # BLD: 3.63 M/UL — LOW (ref 4.2–5.4)
RBC # FLD: 12.7 % — SIGNIFICANT CHANGE UP (ref 11.5–14.5)
RBC CASTS # UR COMP ASSIST: (no result) /HPF
SODIUM SERPL-SCNC: 131 MMOL/L — LOW (ref 135–146)
SODIUM SERPL-SCNC: 135 MMOL/L — SIGNIFICANT CHANGE UP (ref 135–146)
SP GR SPEC: 1.02 — SIGNIFICANT CHANGE UP (ref 1.01–1.03)
UROBILINOGEN FLD QL: 1 MG/DL (ref 0.2–0.2)
WBC # BLD: 14.47 K/UL — HIGH (ref 4.8–10.8)
WBC # FLD AUTO: 14.47 K/UL — HIGH (ref 4.8–10.8)
WBC UR QL: >50 /HPF

## 2018-03-10 RX ORDER — DIATRIZOATE MEGLUMINE 180 MG/ML
20 INJECTION, SOLUTION INTRAVESICAL ONCE
Qty: 0 | Refills: 0 | Status: COMPLETED | OUTPATIENT
Start: 2018-03-10 | End: 2018-03-10

## 2018-03-10 RX ORDER — SODIUM CHLORIDE 9 MG/ML
1000 INJECTION INTRAMUSCULAR; INTRAVENOUS; SUBCUTANEOUS ONCE
Qty: 0 | Refills: 0 | Status: COMPLETED | OUTPATIENT
Start: 2018-03-10 | End: 2018-03-10

## 2018-03-10 RX ADMIN — SODIUM CHLORIDE 1000 MILLILITER(S): 9 INJECTION INTRAMUSCULAR; INTRAVENOUS; SUBCUTANEOUS at 18:20

## 2018-03-10 RX ADMIN — DIATRIZOATE MEGLUMINE 20 MILLILITER(S): 180 INJECTION, SOLUTION INTRAVESICAL at 18:31

## 2018-03-10 NOTE — ED ADULT NURSE REASSESSMENT NOTE - NS ED NURSE REASSESS COMMENT FT1
Pt resting on bed. pt DIMITRY draining yellow drainage. pt still drinking for CT. Reminded pt of urine sample. No distress noted on pt. VSS

## 2018-03-10 NOTE — ED ADULT TRIAGE NOTE - CHIEF COMPLAINT QUOTE
s/p pancreatic surgery february 20th, 2018 has drainage from surgical site and fever started last night

## 2018-03-10 NOTE — ED PROVIDER NOTE - NS ED ROS FT
Review of Systems:  · CONSTITUTIONAL: + fever and no chills.  · EYES: no discharge, no irritation, no pain, no redness, and no visual changes.  · ENMT: Ears: no ear pain and no hearing problems. Nose: no nasal congestion and no nasal drainage. Mouth/Throat: no dysphagia, no hoarseness and no throat pain.  · CARDIOVASCULAR: no chest pain  · RESPIRATORY: no cough, and no shortness of breath.  · GASTROINTESTINAL: + abdominal pain, no diarrhea, no nausea and no vomiting.  · GENITOURINARY: no dysuria  · MUSCULOSKELETAL: + back pain, no musculoskeletal pain, no weakness.  · SKIN: no rashes.  · NEURO: no loss of consciousness, no headache.  · ROS STATEMENT: all other ROS negative except as per HPI

## 2018-03-10 NOTE — ED PROVIDER NOTE - OBJECTIVE STATEMENT
34 y/o F s/p Whipple procedure on 2/20 with Dr. Hudson and Dr. Dunn.  Pt was doing well post op and had an appt with Dr. Hudson this week, however, sates that in the past 2 days she's had worsening back pain and also her home nurse this am noted that she had a temp to 100.1F and a change in her DIMITRY drain from brown to creamy colored.  No n/v/d. Mild AP. no dysuria. No cough.  No PMH.

## 2018-03-10 NOTE — ED PROVIDER NOTE - PHYSICAL EXAMINATION
Physical Examination:   VITAL SIGNS: I have reviewed vital signs.  CONSTITUTIONAL: well appearing, NAD.   SKIN: Skin exam is warm and dry.  No rashes  HEAD: Normocephalic; atraumatic.  EYES: PERRL, EOM intact; conjunctiva and sclera clear.  ENT: No nasal discharge; airway clear.  CARD: S1, S2 reg  RESP: No wheezes, rales or rhonchi.  ABD: soft; non-distended; mild ttp generalized.  + DIMITRY drain on r abd with creamy serous drainage. surgical wounds well appearing.  No guarding or rebound.  + R CVAT.    EXT: Normal ROM. No edema.  NEURO: Alert, oriented. Grossly unremarkable. No focal deficits. Ambulatory with steady gait.  PSYCH: Cooperative, appropriate.

## 2018-03-10 NOTE — ED ADULT NURSE NOTE - OBJECTIVE STATEMENT
pt had tumor removed from pancreas in February. pt has surgical scar across the abdomen. pt has small redness to right end of scar with mild tenderness & scant oozing that started yesterday. pt has DIMITRY drain to left side of abdomen draining purulent  fluid. pt has tenderness upon palpation to area. skin around drain dry & intact. pt developed fever today up to 100.5 with increased fatigue & weakness.

## 2018-03-10 NOTE — ED PROVIDER NOTE - PROGRESS NOTE DETAILS
d/w Dr. Squires surg resident, Dr. Hudson evaluated the Pt and would like an amylase of the DIMITRY drainage fluid sent, along with a CT a/p with PO and IV contrast, and labs.  Surgical team took a cx of the wound. d/w Dr. Squires - aware that CT is back and report shows possible abscess.  he will come up with a plan and get back to me. Per Dr. ruiz, admit to surgical team for abscess.  Dr. Hudson.  they will start abx.  Requested that we send a cx of the DIMITRY drain fluid.

## 2018-03-10 NOTE — ED PROVIDER NOTE - PMH
H/O gastroesophageal reflux (GERD)    H/O resection of pancreas  pancreaticoduodenectomy - whipple procedure  History of cholecystectomy

## 2018-03-11 LAB
APPEARANCE UR: (no result)
B PERT IGG+IGM PNL SER: (no result)
BASOPHILS # BLD AUTO: 0.03 K/UL — SIGNIFICANT CHANGE UP (ref 0–0.2)
BASOPHILS NFR BLD AUTO: 0.2 % — SIGNIFICANT CHANGE UP (ref 0–1)
BILIRUB UR-MCNC: NEGATIVE — SIGNIFICANT CHANGE UP
COLOR FLD: SIGNIFICANT CHANGE UP
COLOR SPEC: YELLOW — SIGNIFICANT CHANGE UP
DIFF PNL FLD: (no result)
EOSINOPHIL # BLD AUTO: 0.25 K/UL — SIGNIFICANT CHANGE UP (ref 0–0.7)
EOSINOPHIL NFR BLD AUTO: 2 % — SIGNIFICANT CHANGE UP (ref 0–8)
FLUID INTAKE SUBSTANCE CLASS: SIGNIFICANT CHANGE UP
FLUID SEGMENTED GRANULOCYTES: 88 % — SIGNIFICANT CHANGE UP
GLUCOSE UR QL: NEGATIVE MG/DL — SIGNIFICANT CHANGE UP
HCT VFR BLD CALC: 26.8 % — LOW (ref 37–47)
HGB BLD-MCNC: 8.7 G/DL — LOW (ref 12–16)
IMM GRANULOCYTES NFR BLD AUTO: 0.4 % — HIGH (ref 0.1–0.3)
KETONES UR-MCNC: 15
LEUKOCYTE ESTERASE UR-ACNC: (no result)
LYMPHOCYTES # BLD AUTO: 1.84 K/UL — SIGNIFICANT CHANGE UP (ref 1.2–3.4)
LYMPHOCYTES # BLD AUTO: 14.8 % — LOW (ref 20.5–51.1)
LYMPHOCYTES # FLD: 10 — SIGNIFICANT CHANGE UP
MCHC RBC-ENTMCNC: 28 PG — SIGNIFICANT CHANGE UP (ref 27–31)
MCHC RBC-ENTMCNC: 32.5 G/DL — SIGNIFICANT CHANGE UP (ref 32–37)
MCV RBC AUTO: 86.2 FL — SIGNIFICANT CHANGE UP (ref 81–99)
MONOCYTES # BLD AUTO: 1.39 K/UL — HIGH (ref 0.1–0.6)
MONOCYTES NFR BLD AUTO: 11.2 % — HIGH (ref 1.7–9.3)
MONOS+MACROS # FLD: 2 % — SIGNIFICANT CHANGE UP
NEUTROPHILS # BLD AUTO: 8.85 K/UL — HIGH (ref 1.4–6.5)
NEUTROPHILS NFR BLD AUTO: 71.4 % — SIGNIFICANT CHANGE UP (ref 42.2–75.2)
NITRITE UR-MCNC: NEGATIVE — SIGNIFICANT CHANGE UP
NRBC # BLD: 0 /100 WBCS — SIGNIFICANT CHANGE UP (ref 0–0)
PH UR: 7 — SIGNIFICANT CHANGE UP (ref 5–8)
PLATELET # BLD AUTO: 588 K/UL — HIGH (ref 130–400)
PROT UR-MCNC: (no result) MG/DL
RBC # BLD: 3.11 M/UL — LOW (ref 4.2–5.4)
RBC # FLD: 12.8 % — SIGNIFICANT CHANGE UP (ref 11.5–14.5)
RCV VOL RI: HIGH /UL (ref 0–5)
SP GR SPEC: 1.02 — SIGNIFICANT CHANGE UP (ref 1.01–1.03)
SPECIMEN SOURCE FLD: SIGNIFICANT CHANGE UP
TOTAL NUCLEATED CELL COUNT, BODY FLUID: HIGH /UL (ref 0–5)
TUBE TYPE: SIGNIFICANT CHANGE UP
UROBILINOGEN FLD QL: 1 MG/DL (ref 0.2–0.2)
WBC # BLD: 12.41 K/UL — HIGH (ref 4.8–10.8)
WBC # FLD AUTO: 12.41 K/UL — HIGH (ref 4.8–10.8)

## 2018-03-11 RX ORDER — SODIUM CHLORIDE 9 MG/ML
1000 INJECTION, SOLUTION INTRAVENOUS
Qty: 0 | Refills: 0 | Status: DISCONTINUED | OUTPATIENT
Start: 2018-03-11 | End: 2018-03-12

## 2018-03-11 RX ORDER — ONDANSETRON 8 MG/1
4 TABLET, FILM COATED ORAL EVERY 6 HOURS
Qty: 0 | Refills: 0 | Status: DISCONTINUED | OUTPATIENT
Start: 2018-03-11 | End: 2018-03-14

## 2018-03-11 RX ORDER — CEFEPIME 1 G/1
INJECTION, POWDER, FOR SOLUTION INTRAMUSCULAR; INTRAVENOUS
Qty: 0 | Refills: 0 | Status: DISCONTINUED | OUTPATIENT
Start: 2018-03-11 | End: 2018-03-13

## 2018-03-11 RX ORDER — IBUPROFEN 200 MG
800 TABLET ORAL EVERY 6 HOURS
Qty: 0 | Refills: 0 | Status: DISCONTINUED | OUTPATIENT
Start: 2018-03-11 | End: 2018-03-14

## 2018-03-11 RX ORDER — VANCOMYCIN HCL 1 G
1000 VIAL (EA) INTRAVENOUS ONCE
Qty: 0 | Refills: 0 | Status: COMPLETED | OUTPATIENT
Start: 2018-03-11 | End: 2018-03-11

## 2018-03-11 RX ORDER — CEFEPIME 1 G/1
1000 INJECTION, POWDER, FOR SOLUTION INTRAMUSCULAR; INTRAVENOUS EVERY 12 HOURS
Qty: 0 | Refills: 0 | Status: DISCONTINUED | OUTPATIENT
Start: 2018-03-11 | End: 2018-03-13

## 2018-03-11 RX ORDER — HEPARIN SODIUM 5000 [USP'U]/ML
5000 INJECTION INTRAVENOUS; SUBCUTANEOUS EVERY 12 HOURS
Qty: 0 | Refills: 0 | Status: DISCONTINUED | OUTPATIENT
Start: 2018-03-11 | End: 2018-03-14

## 2018-03-11 RX ORDER — VANCOMYCIN HCL 1 G
VIAL (EA) INTRAVENOUS
Qty: 0 | Refills: 0 | Status: DISCONTINUED | OUTPATIENT
Start: 2018-03-11 | End: 2018-03-13

## 2018-03-11 RX ORDER — VANCOMYCIN HCL 1 G
1000 VIAL (EA) INTRAVENOUS EVERY 12 HOURS
Qty: 0 | Refills: 0 | Status: DISCONTINUED | OUTPATIENT
Start: 2018-03-11 | End: 2018-03-13

## 2018-03-11 RX ORDER — MORPHINE SULFATE 50 MG/1
4 CAPSULE, EXTENDED RELEASE ORAL EVERY 4 HOURS
Qty: 0 | Refills: 0 | Status: DISCONTINUED | OUTPATIENT
Start: 2018-03-11 | End: 2018-03-14

## 2018-03-11 RX ORDER — PANTOPRAZOLE SODIUM 20 MG/1
40 TABLET, DELAYED RELEASE ORAL
Qty: 0 | Refills: 0 | Status: DISCONTINUED | OUTPATIENT
Start: 2018-03-11 | End: 2018-03-14

## 2018-03-11 RX ORDER — ACETAMINOPHEN 500 MG
325 TABLET ORAL EVERY 4 HOURS
Qty: 0 | Refills: 0 | Status: DISCONTINUED | OUTPATIENT
Start: 2018-03-11 | End: 2018-03-14

## 2018-03-11 RX ORDER — DEXTROSE MONOHYDRATE, SODIUM CHLORIDE, AND POTASSIUM CHLORIDE 50; .745; 4.5 G/1000ML; G/1000ML; G/1000ML
1000 INJECTION, SOLUTION INTRAVENOUS
Qty: 0 | Refills: 0 | Status: DISCONTINUED | OUTPATIENT
Start: 2018-03-11 | End: 2018-03-11

## 2018-03-11 RX ORDER — CEFEPIME 1 G/1
1000 INJECTION, POWDER, FOR SOLUTION INTRAMUSCULAR; INTRAVENOUS ONCE
Qty: 0 | Refills: 0 | Status: COMPLETED | OUTPATIENT
Start: 2018-03-11 | End: 2018-03-11

## 2018-03-11 RX ADMIN — Medication 325 MILLIGRAM(S): at 21:44

## 2018-03-11 RX ADMIN — Medication 250 MILLIGRAM(S): at 01:51

## 2018-03-11 RX ADMIN — PANTOPRAZOLE SODIUM 40 MILLIGRAM(S): 20 TABLET, DELAYED RELEASE ORAL at 07:49

## 2018-03-11 RX ADMIN — Medication 325 MILLIGRAM(S): at 15:39

## 2018-03-11 RX ADMIN — CEFEPIME 100 MILLIGRAM(S): 1 INJECTION, POWDER, FOR SOLUTION INTRAMUSCULAR; INTRAVENOUS at 17:22

## 2018-03-11 RX ADMIN — HEPARIN SODIUM 5000 UNIT(S): 5000 INJECTION INTRAVENOUS; SUBCUTANEOUS at 17:22

## 2018-03-11 RX ADMIN — HEPARIN SODIUM 5000 UNIT(S): 5000 INJECTION INTRAVENOUS; SUBCUTANEOUS at 06:32

## 2018-03-11 RX ADMIN — Medication 250 MILLIGRAM(S): at 17:22

## 2018-03-11 RX ADMIN — CEFEPIME 100 MILLIGRAM(S): 1 INJECTION, POWDER, FOR SOLUTION INTRAMUSCULAR; INTRAVENOUS at 01:51

## 2018-03-11 NOTE — H&P ADULT - NSHPLABSRESULTS_GEN_ALL_CORE
10.3   14.47 )-----------( 620      ( 10 Mar 2018 18:00 )             30.7   03-10    135  |  101  |  8<L>  ----------------------------<  114<H>  4.1   |  21  |  0.5<L>    Ca    8.8      10 Mar 2018 20:40  Mg     2.2     03-10    TPro  6.3  /  Alb  2.6<L>  /  TBili  0.5  /  DBili  x   /  AST  31  /  ALT  40  /  AlkPhos  125<H>  03-10    < from: CT Abdomen and Pelvis w/ Oral Cont and w/ IV Cont (03.10.18 @ 22:10) >    IMPRESSION:        Since February 23, 2018:    Interval development of increasing complexity and loculation of   previously noted fluid collections within the surgical bed, for example   the inferior collection measuring 4.5 x 3.2 x 2.9 cm, is compatible with   early abscess.     Interval migration of pancreatic duct stent now within the gallbladder   fossa.     No evidence of anastomotic leak or bowel obstruction.     Interval improvement in bibasilar opacities.    Additional findings are unchanged on this short-term follow-up study.    < end of copied text >

## 2018-03-11 NOTE — H&P ADULT - ASSESSMENT
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 Hudson about possible reposition of pancreatic stent by GI and drainage of fluid collection, unlikely amenable to drain by IR in this location

## 2018-03-11 NOTE — H&P ADULT - HISTORY OF PRESENT ILLNESS
32 yo F s/p recent Whipple procedure, pancreatic stent placement for pseudopappilary tumor of pancreatic head on 02/05/18. 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 Was seen by Dr. Hudson last week and was doing ok. Yesterday, started to have fever 101, purulent discharge from drain.    ALL: NKDA  Meds; none

## 2018-03-11 NOTE — H&P ADULT - ATTENDING COMMENTS
came back to ED with erythema in the wound and purulent d/c from left DIMITRY.  fever of 100.1 at home.  WC 15.  CT with questionable peripancreatic fluid collection.    Admit with IV abx and IVF.  Start regular diet today.  Continue DIMITRY.  Check amylase from left DIMITRY drain.

## 2018-03-11 NOTE — H&P ADULT - NSHPPHYSICALEXAM_GEN_ALL_CORE
Gen: a&ox3  Lungs; clear b/l lungs  Abd: soft, mild epigastric tenderness, ND, LUQ DIMITRY drain - gray/purulent discharge, RUQ cellulitic changes around RUQ incision, wound was open, no pus, packed

## 2018-03-12 ENCOUNTER — APPOINTMENT (OUTPATIENT)
Dept: SURGERY | Facility: CLINIC | Age: 33
End: 2018-03-12

## 2018-03-12 LAB
ANION GAP SERPL CALC-SCNC: 7 MMOL/L — SIGNIFICANT CHANGE UP (ref 7–14)
BASOPHILS # BLD AUTO: 0.02 K/UL — SIGNIFICANT CHANGE UP (ref 0–0.2)
BASOPHILS NFR BLD AUTO: 0.2 % — SIGNIFICANT CHANGE UP (ref 0–1)
BUN SERPL-MCNC: <5 MG/DL — LOW (ref 10–20)
CALCIUM SERPL-MCNC: 8.7 MG/DL — SIGNIFICANT CHANGE UP (ref 8.5–10.1)
CHLORIDE SERPL-SCNC: 103 MMOL/L — SIGNIFICANT CHANGE UP (ref 98–110)
CO2 SERPL-SCNC: 24 MMOL/L — SIGNIFICANT CHANGE UP (ref 17–32)
CREAT SERPL-MCNC: 0.4 MG/DL — LOW (ref 0.7–1.5)
CULTURE RESULTS: SIGNIFICANT CHANGE UP
EOSINOPHIL # BLD AUTO: 0.23 K/UL — SIGNIFICANT CHANGE UP (ref 0–0.7)
EOSINOPHIL NFR BLD AUTO: 2.5 % — SIGNIFICANT CHANGE UP (ref 0–8)
GLUCOSE SERPL-MCNC: 93 MG/DL — SIGNIFICANT CHANGE UP (ref 70–110)
GRAM STN FLD: SIGNIFICANT CHANGE UP
HCT VFR BLD CALC: 25.2 % — LOW (ref 37–47)
HGB BLD-MCNC: 8.4 G/DL — LOW (ref 12–16)
IMM GRANULOCYTES NFR BLD AUTO: 0.5 % — HIGH (ref 0.1–0.3)
LYMPHOCYTES # BLD AUTO: 1.63 K/UL — SIGNIFICANT CHANGE UP (ref 1.2–3.4)
LYMPHOCYTES # BLD AUTO: 17.7 % — LOW (ref 20.5–51.1)
MAGNESIUM SERPL-MCNC: 1.6 MG/DL — LOW (ref 1.8–2.4)
MCHC RBC-ENTMCNC: 27.9 PG — SIGNIFICANT CHANGE UP (ref 27–31)
MCHC RBC-ENTMCNC: 33.3 G/DL — SIGNIFICANT CHANGE UP (ref 32–37)
MCV RBC AUTO: 83.7 FL — SIGNIFICANT CHANGE UP (ref 81–99)
MONOCYTES # BLD AUTO: 1.14 K/UL — HIGH (ref 0.1–0.6)
MONOCYTES NFR BLD AUTO: 12.4 % — HIGH (ref 1.7–9.3)
NEUTROPHILS # BLD AUTO: 6.13 K/UL — SIGNIFICANT CHANGE UP (ref 1.4–6.5)
NEUTROPHILS NFR BLD AUTO: 66.7 % — SIGNIFICANT CHANGE UP (ref 42.2–75.2)
PHOSPHATE SERPL-MCNC: 3.9 MG/DL — SIGNIFICANT CHANGE UP (ref 2.1–4.9)
PLATELET # BLD AUTO: 503 K/UL — HIGH (ref 130–400)
POTASSIUM SERPL-MCNC: 3.9 MMOL/L — SIGNIFICANT CHANGE UP (ref 3.5–5)
POTASSIUM SERPL-SCNC: 3.9 MMOL/L — SIGNIFICANT CHANGE UP (ref 3.5–5)
RBC # BLD: 3.01 M/UL — LOW (ref 4.2–5.4)
RBC # FLD: 12.6 % — SIGNIFICANT CHANGE UP (ref 11.5–14.5)
SODIUM SERPL-SCNC: 134 MMOL/L — LOW (ref 135–146)
SPECIMEN SOURCE: SIGNIFICANT CHANGE UP
SPECIMEN SOURCE: SIGNIFICANT CHANGE UP
WBC # BLD: 9.2 K/UL — SIGNIFICANT CHANGE UP (ref 4.8–10.8)
WBC # FLD AUTO: 9.2 K/UL — SIGNIFICANT CHANGE UP (ref 4.8–10.8)

## 2018-03-12 RX ORDER — MAGNESIUM SULFATE 500 MG/ML
2 VIAL (ML) INJECTION ONCE
Qty: 0 | Refills: 0 | Status: COMPLETED | OUTPATIENT
Start: 2018-03-12 | End: 2018-03-12

## 2018-03-12 RX ORDER — SODIUM CHLORIDE 9 MG/ML
1000 INJECTION, SOLUTION INTRAVENOUS
Qty: 0 | Refills: 0 | Status: DISCONTINUED | OUTPATIENT
Start: 2018-03-12 | End: 2018-03-12

## 2018-03-12 RX ADMIN — PANTOPRAZOLE SODIUM 40 MILLIGRAM(S): 20 TABLET, DELAYED RELEASE ORAL at 10:15

## 2018-03-12 RX ADMIN — Medication 25 GRAM(S): at 14:55

## 2018-03-12 RX ADMIN — HEPARIN SODIUM 5000 UNIT(S): 5000 INJECTION INTRAVENOUS; SUBCUTANEOUS at 05:15

## 2018-03-12 RX ADMIN — Medication 250 MILLIGRAM(S): at 05:16

## 2018-03-12 RX ADMIN — HEPARIN SODIUM 5000 UNIT(S): 5000 INJECTION INTRAVENOUS; SUBCUTANEOUS at 17:13

## 2018-03-12 RX ADMIN — CEFEPIME 100 MILLIGRAM(S): 1 INJECTION, POWDER, FOR SOLUTION INTRAMUSCULAR; INTRAVENOUS at 17:12

## 2018-03-12 RX ADMIN — CEFEPIME 100 MILLIGRAM(S): 1 INJECTION, POWDER, FOR SOLUTION INTRAMUSCULAR; INTRAVENOUS at 05:16

## 2018-03-12 RX ADMIN — Medication 250 MILLIGRAM(S): at 21:14

## 2018-03-12 NOTE — PROGRESS NOTE ADULT - SUBJECTIVE AND OBJECTIVE BOX
SHIELA VERMA  33y Female   [id number]    Hospital Day: 2  Post Operative Day:  DX: Abscess - 4.5x3.2x2.9cm collection in surgical bed  Patient is a 33y old  Female who presents with a chief complaint of mild abdominal pain, fever (11 Mar 2018 00:25)    PAST MEDICAL & SURGICAL HISTORY:  History of cholecystectomy  H/O resection of pancreas: pancreaticoduodenectomy - whipple procedure  H/O gastroesophageal reflux (GERD)  H/O malignant neoplasm of pancreas      Events of the Last 24h:  Vital Signs Last 24 Hrs  T(C): 37.7 (12 Mar 2018 00:15), Max: 38.7 (11 Mar 2018 21:44)  T(F): 99.9 (12 Mar 2018 00:15), Max: 101.6 (11 Mar 2018 21:44)  HR: 92 (12 Mar 2018 00:15) (16 - 92)  BP: 119/65 (12 Mar 2018 00:15) (100/58 - 119/65)  BP(mean): --  RR: 18 (12 Mar 2018 00:15) (16 - 20)  SpO2: --        Diet, Regular (18 @ 09:41)      I&O's Summary    11 Mar 2018 07:  -  12 Mar 2018 04:38  --------------------------------------------------------  IN: 1175 mL / OUT: 40 mL / NET: 1135 mL     I&O's Detail    11 Mar 2018 07:  -  12 Mar 2018 04:38  --------------------------------------------------------  IN:    IV PiggyBack: 300 mL    lactated ringers.: 875 mL  Total IN: 1175 mL    OUT:    Open/Penrose: 40 mL  Total OUT: 40 mL    Total NET: 1135 mL          MEDICATIONS  (STANDING):  cefepime  IVPB 1000 milliGRAM(s) IV Intermittent every 12 hours  cefepime  IVPB      heparin  Injectable 5000 Unit(s) SubCutaneous every 12 hours  lactated ringers. 1000 milliLiter(s) (125 mL/Hr) IV Continuous <Continuous>  pantoprazole    Tablet 40 milliGRAM(s) Oral before breakfast  vancomycin  IVPB      vancomycin  IVPB 1000 milliGRAM(s) IV Intermittent every 12 hours    MEDICATIONS  (PRN):  acetaminophen   Tablet 325 milliGRAM(s) Oral every 4 hours PRN For Temp greater than 38 C (100.4 F)  acetaminophen   Tablet. 325 milliGRAM(s) Oral every 4 hours PRN Mild Pain (1 - 3)  ibuprofen  Tablet 800 milliGRAM(s) Oral every 6 hours PRN moderate pain  morphine  - Injectable 4 milliGRAM(s) IV Push every 4 hours PRN Severe Pain (7 - 10)  ondansetron Injectable 4 milliGRAM(s) IV Push every 6 hours PRN Nausea      PHYSICAL EXAM:    GENERAL: NAD    HEENT: NCAT    CHEST/LUNGS: CTAB    HEART: RRR,  No murmurs, rubs, or gallops    ABDOMEN:     EXTREMITIES:  FROM, No clubbing, cyanosis, or edema, palpable pulse    NEURO: No focal neurological deficits    SKIN: No rashes or lesions    INCISION/WOUNDS:                          8.4    9.20  )-----------( 503      ( 12 Mar 2018 01:56 )             25.2        CBC Full  -  ( 12 Mar 2018 01:56 )  WBC Count : 9.20 K/uL  Hemoglobin : 8.4 g/dL  Hematocrit : 25.2 %  Platelet Count - Automated : 503 K/uL  Mean Cell Volume : 83.7 fL  Mean Cell Hemoglobin : 27.9 pg  Mean Cell Hemoglobin Concentration : 33.3 g/dL  Auto Neutrophil # : 6.13 K/uL  Auto Lymphocyte # : 1.63 K/uL  Auto Monocyte # : 1.14 K/uL  Auto Eosinophil # : 0.23 K/uL  Auto Basophil # : 0.02 K/uL  Auto Neutrophil % : 66.7 %  Auto Lymphocyte % : 17.7 %  Auto Monocyte % : 12.4 %  Auto Eosinophil % : 2.5 %  Auto Basophil % : 0.2 %               134   |  103   |  <5                 Ca: 8.7    BMP:   ----------------------------< 93     M.6   (18 @ 01:56)             3.9    |  24    | 0.4                Ph: 3.9      LFT:     TPro: 6.3 / Alb: 2.6 / TBili: 0.5 / DBili: x / AST: 31 / ALT: 40 / AlkPhos: 125   (03-10-18 @ 20:40)    LIVER FUNCTIONS - ( 10 Mar 2018 20:40 )  Alb: 2.6 g/dL / Pro: 6.3 g/dL / ALK PHOS: 125 U/L / ALT: 40 U/L / AST: 31 U/L / GGT: x           PT/INR - ( 10 Mar 2018 18:00 )   PT: 16.50 sec;   INR: 1.51 ratio         PTT - ( 10 Mar 2018 18:00 )  PTT:33.2 sec      Urinalysis Basic - ( 11 Mar 2018 12:57 )    Color: Yellow / Appearance: Turbid / S.025 / pH: x  Gluc: x / Ketone: 15  / Bili: Negative / Urobili: 1.0 mg/dL   Blood: x / Protein: Trace mg/dL / Nitrite: Negative   Leuk Esterase: Large / RBC: x / WBC >50 /HPF   Sq Epi: x / Non Sq Epi: Moderate /HPF / Bacteria: Many /HPF        Culture - Surgical Swab (collected 10 Mar 2018 17:55)  Source: .Surgical Swab r side of abdominal incision  Preliminary Report (11 Mar 2018 19:51):    Growth in fluid media only Gram positive cocci in pairs        IMAGING: < from: CT Abdomen and Pelvis w/ Oral Cont and w/ IV Cont (03.10.18 @ 22:10) >  Since 2018:    Interval development of increasing complexity and loculation of   previously noted fluid collections within the surgical bed, for example   the inferior collection measuring 4.5 x 3.2 x 2.9 cm, is compatible with   early abscess.     Interval migration of pancreatic duct stent now within the gallbladder   fossa.     No evidence of anastomotic leak or bowel obstruction.     Interval improvement in bibasilar opacities.    Additional findings are unchanged on this short-term follow-up study.    < end of copied text >      SPECTRA: [id number] SHIELA VERMA  33y Female   [id number]    Hospital Day: 2  Post Operative Day:  DX: Abscess - 4.5x3.2x2.9cm collection in surgical bed  Patient is a 33y old  Female who presents with a chief complaint of mild abdominal pain, fever (11 Mar 2018 00:25)    PAST MEDICAL & SURGICAL HISTORY:  History of cholecystectomy  H/O resection of pancreas: pancreaticoduodenectomy - whipple procedure  H/O gastroesophageal reflux (GERD)  H/O malignant neoplasm of pancreas      Events of the Last 24h: +flatus, -BM, 50cc purulent discharge from DIMITRY, Tmax of 101.6F  Vital Signs Last 24 Hrs  T(C): 37.7 (12 Mar 2018 00:15), Max: 38.7 (11 Mar 2018 21:44)  T(F): 99.9 (12 Mar 2018 00:15), Max: 101.6 (11 Mar 2018 21:44)  HR: 92 (12 Mar 2018 00:15) (16 - 92)  BP: 119/65 (12 Mar 2018 00:15) (100/58 - 119/65)  BP(mean): --  RR: 18 (12 Mar 2018 00:15) (16 - 20)  SpO2: --        Diet, Regular (18 @ 09:41)      I&O's Summary    11 Mar 2018 07:  -  12 Mar 2018 04:38  --------------------------------------------------------  IN: 1175 mL / OUT: 40 mL / NET: 1135 mL     I&O's Detail    11 Mar 2018 07:  -  12 Mar 2018 04:38  --------------------------------------------------------  IN:    IV PiggyBack: 300 mL    lactated ringers.: 875 mL  Total IN: 1175 mL    OUT:    Open/Penrose: 40 mL  Total OUT: 40 mL    Total NET: 1135 mL          MEDICATIONS  (STANDING):  cefepime  IVPB 1000 milliGRAM(s) IV Intermittent every 12 hours  cefepime  IVPB      heparin  Injectable 5000 Unit(s) SubCutaneous every 12 hours  lactated ringers. 1000 milliLiter(s) (125 mL/Hr) IV Continuous <Continuous>  pantoprazole    Tablet 40 milliGRAM(s) Oral before breakfast  vancomycin  IVPB      vancomycin  IVPB 1000 milliGRAM(s) IV Intermittent every 12 hours    MEDICATIONS  (PRN):  acetaminophen   Tablet 325 milliGRAM(s) Oral every 4 hours PRN For Temp greater than 38 C (100.4 F)  acetaminophen   Tablet. 325 milliGRAM(s) Oral every 4 hours PRN Mild Pain (1 - 3)  ibuprofen  Tablet 800 milliGRAM(s) Oral every 6 hours PRN moderate pain  morphine  - Injectable 4 milliGRAM(s) IV Push every 4 hours PRN Severe Pain (7 - 10)  ondansetron Injectable 4 milliGRAM(s) IV Push every 6 hours PRN Nausea      PHYSICAL EXAM:    GENERAL: NAD    HEENT: NCAT    CHEST/LUNGS: CTAB    HEART: RRR,  No murmurs, rubs, or gallops    ABDOMEN: soft, epigastric tenderness, non distended, DIMITRY drain in place LLQ    EXTREMITIES:  FROM, No clubbing, cyanosis, or edema, palpable pulse    NEURO: No focal neurological deficits    SKIN: No rashes or lesions    INCISION/WOUNDS: drain site c/d/i                          8.4    9.20  )-----------( 503      ( 12 Mar 2018 01:56 )             25.2        CBC Full  -  ( 12 Mar 2018 01:56 )  WBC Count : 9.20 K/uL  Hemoglobin : 8.4 g/dL  Hematocrit : 25.2 %  Platelet Count - Automated : 503 K/uL  Mean Cell Volume : 83.7 fL  Mean Cell Hemoglobin : 27.9 pg  Mean Cell Hemoglobin Concentration : 33.3 g/dL  Auto Neutrophil # : 6.13 K/uL  Auto Lymphocyte # : 1.63 K/uL  Auto Monocyte # : 1.14 K/uL  Auto Eosinophil # : 0.23 K/uL  Auto Basophil # : 0.02 K/uL  Auto Neutrophil % : 66.7 %  Auto Lymphocyte % : 17.7 %  Auto Monocyte % : 12.4 %  Auto Eosinophil % : 2.5 %  Auto Basophil % : 0.2 %               134   |  103   |  <5                 Ca: 8.7    BMP:   ----------------------------< 93     M.6   (18 @ 01:56)             3.9    |  24    | 0.4                Ph: 3.9      LFT:     TPro: 6.3 / Alb: 2.6 / TBili: 0.5 / DBili: x / AST: 31 / ALT: 40 / AlkPhos: 125   (03-10-18 @ 20:40)    LIVER FUNCTIONS - ( 10 Mar 2018 20:40 )  Alb: 2.6 g/dL / Pro: 6.3 g/dL / ALK PHOS: 125 U/L / ALT: 40 U/L / AST: 31 U/L / GGT: x           PT/INR - ( 10 Mar 2018 18:00 )   PT: 16.50 sec;   INR: 1.51 ratio         PTT - ( 10 Mar 2018 18:00 )  PTT:33.2 sec      Urinalysis Basic - ( 11 Mar 2018 12:57 )    Color: Yellow / Appearance: Turbid / S.025 / pH: x  Gluc: x / Ketone: 15  / Bili: Negative / Urobili: 1.0 mg/dL   Blood: x / Protein: Trace mg/dL / Nitrite: Negative   Leuk Esterase: Large / RBC: x / WBC >50 /HPF   Sq Epi: x / Non Sq Epi: Moderate /HPF / Bacteria: Many /HPF        Culture - Surgical Swab (collected 10 Mar 2018 17:55)  Source: .Surgical Swab r side of abdominal incision  Preliminary Report (11 Mar 2018 19:51):    Growth in fluid media only Gram positive cocci in pairs        IMAGING: < from: CT Abdomen and Pelvis w/ Oral Cont and w/ IV Cont (03.10.18 @ 22:10) >  Since 2018:    Interval development of increasing complexity and loculation of   previously noted fluid collections within the surgical bed, for example   the inferior collection measuring 4.5 x 3.2 x 2.9 cm, is compatible with   early abscess.     Interval migration of pancreatic duct stent now within the gallbladder   fossa.     No evidence of anastomotic leak or bowel obstruction.     Interval improvement in bibasilar opacities.    Additional findings are unchanged on this short-term follow-up study.    < end of copied text >      SPECTRA: [id number]

## 2018-03-13 LAB
-  AMPICILLIN/SULBACTAM: SIGNIFICANT CHANGE UP
-  CEFAZOLIN: SIGNIFICANT CHANGE UP
-  CIPROFLOXACIN: SIGNIFICANT CHANGE UP
-  CLINDAMYCIN: SIGNIFICANT CHANGE UP
-  ERYTHROMYCIN: SIGNIFICANT CHANGE UP
-  GENTAMICIN: SIGNIFICANT CHANGE UP
-  LEVOFLOXACIN: SIGNIFICANT CHANGE UP
-  MOXIFLOXACIN(AEROBIC): SIGNIFICANT CHANGE UP
-  OXACILLIN: SIGNIFICANT CHANGE UP
-  PENICILLIN: SIGNIFICANT CHANGE UP
-  RIFAMPIN: SIGNIFICANT CHANGE UP
-  TETRACYCLINE: SIGNIFICANT CHANGE UP
-  TRIMETHOPRIM/SULFAMETHOXAZOLE: SIGNIFICANT CHANGE UP
-  VANCOMYCIN: SIGNIFICANT CHANGE UP
AMYLASE FLD-CCNC: SIGNIFICANT CHANGE UP U/L
ANION GAP SERPL CALC-SCNC: 8 MMOL/L — SIGNIFICANT CHANGE UP (ref 7–14)
BASOPHILS # BLD AUTO: 0.02 K/UL — SIGNIFICANT CHANGE UP (ref 0–0.2)
BASOPHILS NFR BLD AUTO: 0.3 % — SIGNIFICANT CHANGE UP (ref 0–1)
BUN SERPL-MCNC: <5 MG/DL — LOW (ref 10–20)
CALCIUM SERPL-MCNC: 8.4 MG/DL — LOW (ref 8.5–10.1)
CHLORIDE SERPL-SCNC: 94 MMOL/L — LOW (ref 98–110)
CO2 SERPL-SCNC: 25 MMOL/L — SIGNIFICANT CHANGE UP (ref 17–32)
CREAT SERPL-MCNC: 0.4 MG/DL — LOW (ref 0.7–1.5)
EOSINOPHIL # BLD AUTO: 0.33 K/UL — SIGNIFICANT CHANGE UP (ref 0–0.7)
EOSINOPHIL NFR BLD AUTO: 4.8 % — SIGNIFICANT CHANGE UP (ref 0–8)
GLUCOSE SERPL-MCNC: 101 MG/DL — SIGNIFICANT CHANGE UP (ref 70–110)
HCT VFR BLD CALC: 28.3 % — LOW (ref 37–47)
HGB BLD-MCNC: 9.4 G/DL — LOW (ref 12–16)
IMM GRANULOCYTES NFR BLD AUTO: 0.4 % — HIGH (ref 0.1–0.3)
LYMPHOCYTES # BLD AUTO: 1.58 K/UL — SIGNIFICANT CHANGE UP (ref 1.2–3.4)
LYMPHOCYTES # BLD AUTO: 23.1 % — SIGNIFICANT CHANGE UP (ref 20.5–51.1)
MAGNESIUM SERPL-MCNC: 1.8 MG/DL — SIGNIFICANT CHANGE UP (ref 1.8–2.4)
MCHC RBC-ENTMCNC: 28.2 PG — SIGNIFICANT CHANGE UP (ref 27–31)
MCHC RBC-ENTMCNC: 33.2 G/DL — SIGNIFICANT CHANGE UP (ref 32–37)
MCV RBC AUTO: 85 FL — SIGNIFICANT CHANGE UP (ref 81–99)
METHOD TYPE: SIGNIFICANT CHANGE UP
MONOCYTES # BLD AUTO: 0.7 K/UL — HIGH (ref 0.1–0.6)
MONOCYTES NFR BLD AUTO: 10.2 % — HIGH (ref 1.7–9.3)
NEUTROPHILS # BLD AUTO: 4.17 K/UL — SIGNIFICANT CHANGE UP (ref 1.4–6.5)
NEUTROPHILS NFR BLD AUTO: 61.2 % — SIGNIFICANT CHANGE UP (ref 42.2–75.2)
PHOSPHATE SERPL-MCNC: 4.4 MG/DL — SIGNIFICANT CHANGE UP (ref 2.1–4.9)
PLATELET # BLD AUTO: 527 K/UL — HIGH (ref 130–400)
POTASSIUM SERPL-MCNC: 3.8 MMOL/L — SIGNIFICANT CHANGE UP (ref 3.5–5)
POTASSIUM SERPL-SCNC: 3.8 MMOL/L — SIGNIFICANT CHANGE UP (ref 3.5–5)
RBC # BLD: 3.33 M/UL — LOW (ref 4.2–5.4)
RBC # FLD: 12.5 % — SIGNIFICANT CHANGE UP (ref 11.5–14.5)
SODIUM SERPL-SCNC: 127 MMOL/L — LOW (ref 135–146)
WBC # BLD: 6.83 K/UL — SIGNIFICANT CHANGE UP (ref 4.8–10.8)
WBC # FLD AUTO: 6.83 K/UL — SIGNIFICANT CHANGE UP (ref 4.8–10.8)

## 2018-03-13 RX ORDER — FLUCONAZOLE 150 MG/1
TABLET ORAL
Qty: 0 | Refills: 0 | Status: DISCONTINUED | OUTPATIENT
Start: 2018-03-13 | End: 2018-03-14

## 2018-03-13 RX ORDER — FLUCONAZOLE 150 MG/1
200 TABLET ORAL ONCE
Qty: 0 | Refills: 0 | Status: COMPLETED | OUTPATIENT
Start: 2018-03-13 | End: 2018-03-13

## 2018-03-13 RX ORDER — METRONIDAZOLE 500 MG
500 TABLET ORAL ONCE
Qty: 0 | Refills: 0 | Status: COMPLETED | OUTPATIENT
Start: 2018-03-13 | End: 2018-03-13

## 2018-03-13 RX ORDER — FLUCONAZOLE 150 MG/1
200 TABLET ORAL EVERY 24 HOURS
Qty: 0 | Refills: 0 | Status: DISCONTINUED | OUTPATIENT
Start: 2018-03-14 | End: 2018-03-14

## 2018-03-13 RX ORDER — METRONIDAZOLE 500 MG
500 TABLET ORAL EVERY 8 HOURS
Qty: 0 | Refills: 0 | Status: DISCONTINUED | OUTPATIENT
Start: 2018-03-14 | End: 2018-03-14

## 2018-03-13 RX ORDER — METRONIDAZOLE 500 MG
TABLET ORAL
Qty: 0 | Refills: 0 | Status: DISCONTINUED | OUTPATIENT
Start: 2018-03-13 | End: 2018-03-14

## 2018-03-13 RX ADMIN — Medication 250 MILLIGRAM(S): at 06:51

## 2018-03-13 RX ADMIN — CEFEPIME 100 MILLIGRAM(S): 1 INJECTION, POWDER, FOR SOLUTION INTRAMUSCULAR; INTRAVENOUS at 06:51

## 2018-03-13 RX ADMIN — Medication 250 MILLIGRAM(S): at 18:33

## 2018-03-13 RX ADMIN — HEPARIN SODIUM 5000 UNIT(S): 5000 INJECTION INTRAVENOUS; SUBCUTANEOUS at 06:51

## 2018-03-13 RX ADMIN — FLUCONAZOLE 100 MILLIGRAM(S): 150 TABLET ORAL at 22:00

## 2018-03-13 RX ADMIN — HEPARIN SODIUM 5000 UNIT(S): 5000 INJECTION INTRAVENOUS; SUBCUTANEOUS at 18:34

## 2018-03-13 RX ADMIN — Medication 100 MILLIGRAM(S): at 22:09

## 2018-03-13 RX ADMIN — PANTOPRAZOLE SODIUM 40 MILLIGRAM(S): 20 TABLET, DELAYED RELEASE ORAL at 08:23

## 2018-03-13 RX ADMIN — CEFEPIME 100 MILLIGRAM(S): 1 INJECTION, POWDER, FOR SOLUTION INTRAMUSCULAR; INTRAVENOUS at 18:34

## 2018-03-13 NOTE — CONSULT NOTE ADULT - SUBJECTIVE AND OBJECTIVE BOX
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 Was seen by Dr. Hudson last week and was doing ok. Yesterday, started to have fever 101, purulent discharge from drain.    ALL: NKDA  Meds; none (11 Mar 2018 00:25)    FAMILY HISTORY:  Diabetes (Mother, Father)  Maternal family history of cancer (Mother)    PAST MEDICAL & SURGICAL HISTORY:  History of cholecystectomy  H/O resection of pancreas: pancreaticoduodenectomy - whipple procedure  H/O gastroesophageal reflux (GERD)  H/O malignant neoplasm of pancreas        VITALS:  T(F): 96.8, Max: 99 (18 @ 16:00)  HR: 73  BP: 100/58  RR: 19Vital Signs Last 24 Hrs  T(C): 36 (13 Mar 2018 07:14), Max: 37.2 (12 Mar 2018 16:00)  T(F): 96.8 (13 Mar 2018 07:14), Max: 99 (12 Mar 2018 16:00)  HR: 73 (13 Mar 2018 07:14) (73 - 89)  BP: 100/58 (13 Mar 2018 07:14) (100/58 - 113/68)  BP(mean): --  RR: 19 (13 Mar 2018 07:14) (18 - 19)  SpO2: --    TESTS & MEASUREMENTS:                        9.4    6.83  )-----------( 527      ( 13 Mar 2018 08:48 )             28.3         134<L>  |  103  |  <5<L>  ----------------------------<  93  3.9   |  24  |  0.4<L>    Ca    8.7      12 Mar 2018 01:56  Phos  4.4       Mg     1.8         Culture - Urine (collected 18 @ 12:56)  Source: .Urine Clean Catch (Midstream)  Final Report (18 @ 22:53):    10,000 - 49,000 CFU/mL Presumptive Candida albicans    Culture - Blood (collected 18 @ 12:11)  Source: .Blood None  Preliminary Report (18 @ 01:01):    No growth to date.    Culture - Body Fluid with Gram Stain (collected 18 @ 11:51)  Source: .Body Fluid None  Gram Stain (18 @ 06:48):    polymorphonuclear leukocytes seen    No organisms seen    by cytocentrifuge  Preliminary Report (18 @ 22:39):    Rare Haemophilus parainfluenzae    Culture - Surgical Swab (collected 03-10-18 @ 17:55)  Source: .Surgical Swab r side of abdominal incision  Preliminary Report (18 @ 17:49):    Growth in fluid media only Coag Negative Staphylococcus      Urinalysis Basic - ( 11 Mar 2018 12:57 )    Color: Yellow / Appearance: Turbid / S.025 / pH: x  Gluc: x / Ketone: 15  / Bili: Negative / Urobili: 1.0 mg/dL   Blood: x / Protein: Trace mg/dL / Nitrite: Negative   Leuk Esterase: Large / RBC: x / WBC >50 /HPF   Sq Epi: x / Non Sq Epi: Moderate /HPF / Bacteria: Many /HPF      RADIOLOGY & ADDITIONAL TESTS:    CT ABD/PELVIS 3/10/2018:  Since 2018:    Interval development of increasing complexity and loculation of   previously noted fluid collections within the surgical bed, for example   the inferior collection measuring 4.5 x 3.2 x 2.9 cm, is compatible with   early abscess.     Interval migration of pancreatic duct stent now within the gallbladder   fossa.     No evidence of anastomotic leak or bowel obstruction.     Interval improvement in bibasilar opacities.    Additional findings are unchanged on this short-term follow-up study.    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 Was seen by Dr. Hudson last week and was doing ok. Yesterday, started to have fever 101, purulent discharge from drain.    ALL: NKDA  Meds; none (11 Mar 2018 00:25)    FAMILY HISTORY:  Diabetes (Mother, Father)  Maternal family history of cancer (Mother)    PAST MEDICAL & SURGICAL HISTORY:  History of cholecystectomy  H/O resection of pancreas: pancreaticoduodenectomy - whipple procedure  H/O gastroesophageal reflux (GERD)  H/O malignant neoplasm of pancreas        VITALS:  T(F): 96.8, Max: 99 (18 @ 16:00)  HR: 73  BP: 100/58  RR: 19Vital Signs Last 24 Hrs  T(C): 36 (13 Mar 2018 07:14), Max: 37.2 (12 Mar 2018 16:00)  T(F): 96.8 (13 Mar 2018 07:14), Max: 99 (12 Mar 2018 16:00)  HR: 73 (13 Mar 2018 07:14) (73 - 89)  BP: 100/58 (13 Mar 2018 07:14) (100/58 - 113/68)  BP(mean): --  RR: 19 (13 Mar 2018 07:14) (18 - 19)  SpO2: --      PE:  General: Well appearing  HEENT: no lymphodenopathy  Cardio: s1 and s2 heard, no murmurs, no gallops  Pulmonary: CTABL, no wheezes, no rhonci  Abdomen: normal bowel sounds, excision site shows no redness or swelling, no tenderness on palpation  Extremities: no cyanosis, no pitting edema    TESTS & MEASUREMENTS:                        9.4    6.83  )-----------( 527      ( 13 Mar 2018 08:48 )             28.3     03-12    134<L>  |  103  |  <5<L>  ----------------------------<  93  3.9   |  24  |  0.4<L>    Ca    8.7      12 Mar 2018 01:56  Phos  4.4       Mg     1.8         Culture - Urine (collected 18 @ 12:56)  Source: .Urine Clean Catch (Midstream)  Final Report (18 @ 22:53):    10,000 - 49,000 CFU/mL Presumptive Candida albicans    Culture - Blood (collected 18 @ 12:11)  Source: .Blood None  Preliminary Report (18 @ 01:01):    No growth to date.    Culture - Body Fluid with Gram Stain (collected 18 @ 11:51)  Source: .Body Fluid None  Gram Stain (18 @ 06:48):    polymorphonuclear leukocytes seen    No organisms seen    by cytocentrifuge  Preliminary Report (18 @ 22:39):    Rare Haemophilus parainfluenzae    Culture - Surgical Swab (collected 03-10-18 @ 17:55)  Source: .Surgical Swab r side of abdominal incision  Preliminary Report (18 @ 17:49):    Growth in fluid media only Coag Negative Staphylococcus      Urinalysis Basic - ( 11 Mar 2018 12:57 )    Color: Yellow / Appearance: Turbid / S.025 / pH: x  Gluc: x / Ketone: 15  / Bili: Negative / Urobili: 1.0 mg/dL   Blood: x / Protein: Trace mg/dL / Nitrite: Negative   Leuk Esterase: Large / RBC: x / WBC >50 /HPF   Sq Epi: x / Non Sq Epi: Moderate /HPF / Bacteria: Many /HPF      RADIOLOGY & ADDITIONAL TESTS:    CT ABD/PELVIS 3/10/2018:  Since 2018:    Interval development of increasing complexity and loculation of   previously noted fluid collections within the surgical bed, for example   the inferior collection measuring 4.5 x 3.2 x 2.9 cm, is compatible with   early abscess.     Interval migration of pancreatic duct stent now within the gallbladder   fossa.     No evidence of anastomotic leak or bowel obstruction.     Interval improvement in bibasilar opacities.    Additional findings are unchanged on this short-term follow-up study.    ANTIBIOTICS:  cefepime  IVPB 1000 milliGRAM(s) IV Intermittent every 12 hours  cefepime  IVPB      vancomycin  IVPB      vancomycin  IVPB 1000 milliGRAM(s) IV Intermittent every 12 hours

## 2018-03-13 NOTE — PROGRESS NOTE ADULT - SUBJECTIVE AND OBJECTIVE BOX
Progress Note: General Surgery  Patient: SHIELA VERMA , 33y (1985)Female   MRN: [id number]  Location: [address]  Visit: 18 Inpatient  Date: 18 @ 04:23  Hospital Day: 2    Procedure/Diagnosis: Epigastric abdominal pain, fever, drainage of pus from left sided DIMITRY drain, S/P whipple procedure for pseudopapillary tumor of pancreatic head  Events over 24h: afebrile, pain controlled, ambulating, +BM, +flatus, tolerating regular diet    Vitals: T(F): 98.4 (18 @ 00:00), Max: 99 (18 @ 16:00)  HR: 79 (18 @ 00:00)  BP: 113/63 (18 @ 00:00) (108/63 - 113/68)  RR: 18 (18 @ 00:00)    In:   18 @ 07:01  -  18 @ 07:00  --------------------------------------------------------  IN: 2725 mL    18 @ 07:01  -  18 @ 04:23  --------------------------------------------------------  IN: 0 mL    Out:   18 @ 07:01  -  18 @ 07:00  --------------------------------------------------------  OUT:    Open/Penrose: 40 mL    Voided: 300 mL  Total OUT: 340 mL    18 @ 07:01  -  18 @ 04:23  --------------------------------------------------------  OUT:    Bulb: 30 mL    Voided: 2 mL  Total OUT: 32 mL    Drains:   DIMITRY:   18 @ 07:01  -  18 @ 07:00  --------------------------------------------------------  OUT: 40 mL    18 @ 07:01  -  18 @ 04:23  --------------------------------------------------------  OUT: 30 mL    Net:   18 @ 07:01  -  18 @ 07:00  --------------------------------------------------------  NET: 2385 mL    18 @ 07:01  -  18 @ 04:23  --------------------------------------------------------  NET: -32 mL    Diet: Diet, Regular:   Low Fat (LOWFAT) (18 @ 16:29)  Diet, Regular:   Low Fat (LOWFAT)  Supplement Feeding Modality:  Oral  Ensure Enlive Cans or Servings Per Day:  1       Frequency:  Three Times a day (18 @ 16:32)  IV Fluids: no , Type: IVL    Physical Examination:  General Appearance: NAD, alert and cooperative  HEENT: NCAT, WNL  Heart: S1 and S2. No murmurs. Rhythm is regular  Lungs: Clear to auscultation BL without rales, rhonchi, wheezing, crackles or diminished breath sounds.  Abdomen:  Positive bowel sounds. Soft, nondistended, nontender. No rigidity, guarding, or rebound tenderness. No masses palpated.   Skin: Warm/dry, Normal color, texture and turgor with no lesions or eruptions. No jaundice.   Incisions/Wounds: Dressings in place, clean, dry and intact, no signs of infection    Medications:  acetaminophen   Tablet 325 milliGRAM(s) Oral every 4 hours PRN For Temp greater than 38 C (100.4 F)  acetaminophen   Tablet. 325 milliGRAM(s) Oral every 4 hours PRN Mild Pain (1 - 3)  ibuprofen  Tablet 800 milliGRAM(s) Oral every 6 hours PRN moderate pain  morphine  - Injectable 4 milliGRAM(s) IV Push every 4 hours PRN Severe Pain (7 - 10)  ondansetron Injectable 4 milliGRAM(s) IV Push every 6 hours PRN Nausea    DVT Prophylaxis: heparin  Injectable 5000 Unit(s) SubCutaneous every 12 hours  GI Prophylaxis: pantoprazole    Tablet 40 milliGRAM(s) Oral before breakfast  Antibiotics: cefepime  IVPB 1000 milliGRAM(s) IV Intermittent every 12 hours  cefepime  IVPB      vancomycin  IVPB      vancomycin  IVPB 1000 milliGRAM(s) IV Intermittent every 12 hours    Labs:                        8.4    9.20  )-----------( 503      ( 12 Mar 2018 01:56 )             25.2     03-12    134<L>  |  103  |  <5<L>  ----------------------------<  93  3.9   |  24  |  0.4<L>    Ca    8.7      12 Mar 2018 01:56  Phos  3.9     03-12  Mg     1.6     03-12    Urinalysis Basic - ( 11 Mar 2018 12:57 )    Color: Yellow / Appearance: Turbid / S.025 / pH: x  Gluc: x / Ketone: 15  / Bili: Negative / Urobili: 1.0 mg/dL   Blood: x / Protein: Trace mg/dL / Nitrite: Negative   Leuk Esterase: Large / RBC: x / WBC >50 /HPF   Sq Epi: x / Non Sq Epi: Moderate /HPF / Bacteria: Many /HPF    Culture - Urine (collected 11 Mar 2018 12:56)  Source: .Urine Clean Catch (Midstream)  Final Report (12 Mar 2018 22:53):    10,000 - 49,000 CFU/mL Presumptive Candida albicans    Culture - Blood (collected 11 Mar 2018 12:11)  Source: .Blood None  Preliminary Report (13 Mar 2018 01:01):    No growth to date.    Culture - Body Fluid with Gram Stain (collected 11 Mar 2018 11:51)  Source: .Body Fluid None  Gram Stain (12 Mar 2018 06:48):    polymorphonuclear leukocytes seen    No organisms seen    by cytocentrifuge  Preliminary Report (12 Mar 2018 22:39):    Rare Haemophilus parainfluenzae    Culture - Surgical Swab (collected 10 Mar 2018 17:55)  Source: .Surgical Swab r side of abdominal incision  Preliminary Report (12 Mar 2018 17:49):    Growth in fluid media only Coag Negative Staphylococcus    Imaging:  None

## 2018-03-13 NOTE — CONSULT NOTE ADULT - ASSESSMENT
Pending Attending rounds Intraabdominal abscess without ongoing peritonitis or sepsis.  cefepime 2gm iv q8h  Flagyl 500mg iv q6h

## 2018-03-13 NOTE — CONSULT NOTE ADULT - CONSULT REASON
abscess in surgical bed s/p whipple, draining purulent drainage. cultures demonstrate rare h. parainfluenza

## 2018-03-14 ENCOUNTER — TRANSCRIPTION ENCOUNTER (OUTPATIENT)
Age: 33
End: 2018-03-14

## 2018-03-14 VITALS
RESPIRATION RATE: 20 BRPM | SYSTOLIC BLOOD PRESSURE: 104 MMHG | HEART RATE: 73 BPM | DIASTOLIC BLOOD PRESSURE: 63 MMHG | TEMPERATURE: 97 F

## 2018-03-14 LAB
-  AMPICILLIN/SULBACTAM: SIGNIFICANT CHANGE UP
-  CEFAZOLIN: SIGNIFICANT CHANGE UP
-  CIPROFLOXACIN: SIGNIFICANT CHANGE UP
-  CLINDAMYCIN: SIGNIFICANT CHANGE UP
-  ERYTHROMYCIN: SIGNIFICANT CHANGE UP
-  GENTAMICIN: SIGNIFICANT CHANGE UP
-  LEVOFLOXACIN: SIGNIFICANT CHANGE UP
-  MOXIFLOXACIN(AEROBIC): SIGNIFICANT CHANGE UP
-  OXACILLIN: SIGNIFICANT CHANGE UP
-  PENICILLIN: SIGNIFICANT CHANGE UP
-  RIFAMPIN: SIGNIFICANT CHANGE UP
-  TETRACYCLINE: SIGNIFICANT CHANGE UP
-  TRIMETHOPRIM/SULFAMETHOXAZOLE: SIGNIFICANT CHANGE UP
-  VANCOMYCIN: SIGNIFICANT CHANGE UP
ANION GAP SERPL CALC-SCNC: 12 MMOL/L — SIGNIFICANT CHANGE UP (ref 7–14)
BASOPHILS # BLD AUTO: 0.02 K/UL — SIGNIFICANT CHANGE UP (ref 0–0.2)
BASOPHILS NFR BLD AUTO: 0.3 % — SIGNIFICANT CHANGE UP (ref 0–1)
BUN SERPL-MCNC: 5 MG/DL — LOW (ref 10–20)
CALCIUM SERPL-MCNC: 9.3 MG/DL — SIGNIFICANT CHANGE UP (ref 8.5–10.1)
CHLORIDE SERPL-SCNC: 101 MMOL/L — SIGNIFICANT CHANGE UP (ref 98–110)
CO2 SERPL-SCNC: 24 MMOL/L — SIGNIFICANT CHANGE UP (ref 17–32)
CREAT SERPL-MCNC: 0.4 MG/DL — LOW (ref 0.7–1.5)
EOSINOPHIL # BLD AUTO: 0.38 K/UL — SIGNIFICANT CHANGE UP (ref 0–0.7)
EOSINOPHIL NFR BLD AUTO: 5.5 % — SIGNIFICANT CHANGE UP (ref 0–8)
GLUCOSE SERPL-MCNC: 98 MG/DL — SIGNIFICANT CHANGE UP (ref 70–110)
HCT VFR BLD CALC: 28.9 % — LOW (ref 37–47)
HGB BLD-MCNC: 9.7 G/DL — LOW (ref 12–16)
IMM GRANULOCYTES NFR BLD AUTO: 0.4 % — HIGH (ref 0.1–0.3)
LYMPHOCYTES # BLD AUTO: 1.58 K/UL — SIGNIFICANT CHANGE UP (ref 1.2–3.4)
LYMPHOCYTES # BLD AUTO: 23 % — SIGNIFICANT CHANGE UP (ref 20.5–51.1)
MAGNESIUM SERPL-MCNC: 1.7 MG/DL — LOW (ref 1.8–2.4)
MCHC RBC-ENTMCNC: 27.7 PG — SIGNIFICANT CHANGE UP (ref 27–31)
MCHC RBC-ENTMCNC: 33.6 G/DL — SIGNIFICANT CHANGE UP (ref 32–37)
MCV RBC AUTO: 82.6 FL — SIGNIFICANT CHANGE UP (ref 81–99)
METHOD TYPE: SIGNIFICANT CHANGE UP
MONOCYTES # BLD AUTO: 0.65 K/UL — HIGH (ref 0.1–0.6)
MONOCYTES NFR BLD AUTO: 9.4 % — HIGH (ref 1.7–9.3)
NEUTROPHILS # BLD AUTO: 4.22 K/UL — SIGNIFICANT CHANGE UP (ref 1.4–6.5)
NEUTROPHILS NFR BLD AUTO: 61.4 % — SIGNIFICANT CHANGE UP (ref 42.2–75.2)
PHOSPHATE SERPL-MCNC: 4.3 MG/DL — SIGNIFICANT CHANGE UP (ref 2.1–4.9)
PLATELET # BLD AUTO: 535 K/UL — HIGH (ref 130–400)
POTASSIUM SERPL-MCNC: 3.8 MMOL/L — SIGNIFICANT CHANGE UP (ref 3.5–5)
POTASSIUM SERPL-SCNC: 3.8 MMOL/L — SIGNIFICANT CHANGE UP (ref 3.5–5)
RBC # BLD: 3.5 M/UL — LOW (ref 4.2–5.4)
RBC # FLD: 12.8 % — SIGNIFICANT CHANGE UP (ref 11.5–14.5)
SODIUM SERPL-SCNC: 137 MMOL/L — SIGNIFICANT CHANGE UP (ref 135–146)
WBC # BLD: 6.88 K/UL — SIGNIFICANT CHANGE UP (ref 4.8–10.8)
WBC # FLD AUTO: 6.88 K/UL — SIGNIFICANT CHANGE UP (ref 4.8–10.8)

## 2018-03-14 RX ORDER — ACETAMINOPHEN 500 MG
1 TABLET ORAL
Qty: 0 | Refills: 0 | COMMUNITY
Start: 2018-03-14

## 2018-03-14 RX ORDER — IBUPROFEN 200 MG
1 TABLET ORAL
Qty: 0 | Refills: 0 | COMMUNITY
Start: 2018-03-14

## 2018-03-14 RX ORDER — METRONIDAZOLE 500 MG
1 TABLET ORAL
Qty: 14 | Refills: 0 | OUTPATIENT
Start: 2018-03-14 | End: 2018-03-20

## 2018-03-14 RX ADMIN — Medication 100 MILLIGRAM(S): at 06:17

## 2018-03-14 RX ADMIN — PANTOPRAZOLE SODIUM 40 MILLIGRAM(S): 20 TABLET, DELAYED RELEASE ORAL at 06:16

## 2018-03-14 RX ADMIN — HEPARIN SODIUM 5000 UNIT(S): 5000 INJECTION INTRAVENOUS; SUBCUTANEOUS at 06:16

## 2018-03-14 NOTE — DISCHARGE NOTE ADULT - ADDITIONAL INSTRUCTIONS
Please call the surgical clinic at the number provided below and schedule a follow up visit in 1-2 weeks

## 2018-03-14 NOTE — DISCHARGE NOTE ADULT - CARE PROVIDER_API CALL
Yang Dunn (SUNDEEP), Surgery  [address] [place] [address]  Phone: [phone]  Fax: [phone]

## 2018-03-14 NOTE — PROGRESS NOTE ADULT - ATTENDING COMMENTS
patient doing much better.   dc with levofloxacin.   dc with jeramie drain.   pancreatic leak contained.
pt doing well.   had fever previous night.   awaiting drain cultures.  drain amylase high  Continue abx - switch to oral
pt doing well.   had fever previous night.   awaiting drain cultures.  Continue abx

## 2018-03-14 NOTE — DISCHARGE NOTE ADULT - PATIENT PORTAL LINK FT
You can access the [facility] Patient Portal, offered by [facility], by registering with the following website: [email or web address]

## 2018-03-14 NOTE — DISCHARGE NOTE ADULT - NS AS ACTIVITY OBS
Showering allowed/No Heavy lifting/straining/Walking-Outdoors allowed/Walking-Indoors allowed/Stairs allowed

## 2018-03-14 NOTE — DISCHARGE NOTE ADULT - CARE PLAN
Principal Discharge DX:	Abdominopelvic abscess  Goal:	Complete recovery, follow up in the surgical clinic  Assessment and plan of treatment:	Please call the surgical clinic at the number provided below and schedule a follow up visit in 1-2 weeks  Continue to take the antibiotics as prescribed, please complete both prescriptions: Flagyl 500mg BID for 7 days, Levaquin 750mg qdaily for 14 days  Please take tylenol and motrin for pain

## 2018-03-14 NOTE — PROGRESS NOTE ADULT - ASSESSMENT
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    Plan:  F/U micro studies  F/U ID reccs for abx  SW: prince, D/C planning  possible home abx  F/U drain amylase, f/u drain fungal cx  AM labs    Date/Time: 03-13-18 @ 04:23
PATIENT IS A 32 Y/O F S/P WHIPPLE FOR PANCREATIC CA, PRESENTS W/ SURGICAL BED COLLECTION. DRAIN IN PLACE DRAINING PURULENT FLUID. NO COMPLAINTS OF PAIN, TOLERATING DIET, NO NAUSEA, NO VOMITING. +FLATUS, +BM.    PLAN: ENCOURAGE AMBULATION, IVL, CONTINUE ABX, F/U AM LABS, IF CONTINUES TO PROGRESS CONSIDER D/C HOME ON ORAL ABX.
32 y/o female on hospital day 2 w/ surgical bed abscess s/p pancreaticoduodenectomy. febrile, purulent output from DIMITRY, tolerating diet, +flatus, -BM.    Plan: continue antibiotics, f/u cultures, decrease fluids to 50cc and IVL if tolerating diet well.
Intraabdominal abscess without ongoing peritonitis or sepsis.  cefepime 2gm iv q8h  Flagyl 500mg iv q6h  Proceed with a PICC line.

## 2018-03-14 NOTE — PROGRESS NOTE ADULT - SUBJECTIVE AND OBJECTIVE BOX
SHIELA VERMA  33y Female   [id number]    Hospital Day: 4    Patient is a 33y old  Female who presents with a chief complaint of mild abdominal pain, fever (11 Mar 2018 00:25)    PAST MEDICAL & SURGICAL HISTORY:  History of cholecystectomy  H/O resection of pancreas: pancreaticoduodenectomy - whipple procedure  H/O gastroesophageal reflux (GERD)  H/O malignant neoplasm of pancreas      Events of the Last 24h: no acute events  Vital Signs Last 24 Hrs  T(C): 36.9 (14 Mar 2018 00:00), Max: 36.9 (14 Mar 2018 00:00)  T(F): 98.5 (14 Mar 2018 00:00), Max: 98.5 (14 Mar 2018 00:00)  HR: 82 (14 Mar 2018 00:00) (73 - 97)  BP: 92/57 (14 Mar 2018 00:00) (92/57 - 135/79)  BP(mean): --  RR: 18 (14 Mar 2018 00:00) (16 - 19)  SpO2: --        Diet, Regular:   Low Fat (LOWFAT)  Supplement Feeding Modality:  Oral  Ensure Enlive Cans or Servings Per Day:  1       Frequency:  Three Times a day (18 @ 16:32)      I&O's Summary    12 Mar 2018 07:  -  13 Mar 2018 07:00  --------------------------------------------------------  IN: 0 mL / OUT: 32 mL / NET: -32 mL    13 Mar 2018 07:  -  14 Mar 2018 04:23  --------------------------------------------------------  IN: 480 mL / OUT: 650 mL / NET: -170 mL     I&O's Detail    12 Mar 2018 07:  -  13 Mar 2018 07:00  --------------------------------------------------------  IN:  Total IN: 0 mL    OUT:    Bulb: 30 mL    Voided: 2 mL  Total OUT: 32 mL    Total NET: -32 mL      13 Mar 2018 07:01  -  14 Mar 2018 04:23  --------------------------------------------------------  IN:    Oral Fluid: 480 mL  Total IN: 480 mL    OUT:    Bulb: 100 mL    Voided: 550 mL  Total OUT: 650 mL    Total NET: -170 mL          MEDICATIONS  (STANDING):  fluconAZOLE IVPB      fluconAZOLE IVPB 200 milliGRAM(s) IV Intermittent every 24 hours  heparin  Injectable 5000 Unit(s) SubCutaneous every 12 hours  levoFLOXacin IVPB 500 milliGRAM(s) IV Intermittent every 24 hours  levoFLOXacin IVPB      metroNIDAZOLE  IVPB      metroNIDAZOLE  IVPB 500 milliGRAM(s) IV Intermittent every 8 hours  pantoprazole    Tablet 40 milliGRAM(s) Oral before breakfast    MEDICATIONS  (PRN):  acetaminophen   Tablet 325 milliGRAM(s) Oral every 4 hours PRN For Temp greater than 38 C (100.4 F)  acetaminophen   Tablet. 325 milliGRAM(s) Oral every 4 hours PRN Mild Pain (1 - 3)  ibuprofen  Tablet 800 milliGRAM(s) Oral every 6 hours PRN moderate pain  morphine  - Injectable 4 milliGRAM(s) IV Push every 4 hours PRN Severe Pain (7 - 10)  ondansetron Injectable 4 milliGRAM(s) IV Push every 6 hours PRN Nausea      PHYSICAL EXAM:    GENERAL: NAD    HEENT: NCAT    CHEST/LUNGS: CTAB    HEART: RRR,  No murmurs, rubs, or gallops    ABDOMEN: SNTND +BS    EXTREMITIES:  FROM, No clubbing, cyanosis, or edema, palpable pulse    NEURO: No focal neurological deficits    SKIN: No rashes or lesions    INCISION/WOUNDS: DIMITRY drain w/ purulent drainage in surgical bed s/p caden, incision site C/D/I                          9.4    6.83  )-----------( 527      ( 13 Mar 2018 08:48 )             28.3        CBC Full  -  ( 13 Mar 2018 08:48 )  WBC Count : 6.83 K/uL  Hemoglobin : 9.4 g/dL  Hematocrit : 28.3 %  Platelet Count - Automated : 527 K/uL  Mean Cell Volume : 85.0 fL  Mean Cell Hemoglobin : 28.2 pg  Mean Cell Hemoglobin Concentration : 33.2 g/dL  Auto Neutrophil # : 4.17 K/uL  Auto Lymphocyte # : 1.58 K/uL  Auto Monocyte # : 0.70 K/uL  Auto Eosinophil # : 0.33 K/uL  Auto Basophil # : 0.02 K/uL  Auto Neutrophil % : 61.2 %  Auto Lymphocyte % : 23.1 %  Auto Monocyte % : 10.2 %  Auto Eosinophil % : 4.8 %  Auto Basophil % : 0.3 %               127   |  94    |  <5                 Ca: 8.4    BMP:   ----------------------------< 101    M.8   (18 @ 08:48)             3.8    |  25    | 0.4                Ph: 4.4      LFT:     TPro: 6.3 / Alb: 2.6 / TBili: 0.5 / DBili: x / AST: 31 / ALT: 40 / AlkPhos: 125   (03-10-18 @ 20:40)                Culture - Urine (collected 11 Mar 2018 12:56)  Source: .Urine Clean Catch (Midstream)  Final Report (12 Mar 2018 22:53):    10,000 - 49,000 CFU/mL Presumptive Candida albicans    Culture - Blood (collected 11 Mar 2018 12:11)  Source: .Blood None  Preliminary Report (13 Mar 2018 01:01):    No growth to date.    Culture - Body Fluid with Gram Stain (collected 11 Mar 2018 11:51)  Source: .Body Fluid None  Gram Stain (12 Mar 2018 06:48):    polymorphonuclear leukocytes seen    No organisms seen    by cytocentrifuge  Preliminary Report (13 Mar 2018 19:24):    Rare Haemophilus parainfluenzae    Growth in fluid media only Coag Negative Staphylococcus        IMAGING: NO RECENT IMAGING        SPECTRA: [id number]

## 2018-03-14 NOTE — DISCHARGE NOTE ADULT - MEDICATION SUMMARY - MEDICATIONS TO TAKE
I will START or STAY ON the medications listed below when I get home from the hospital:    Flagyl 500 mg oral tablet  -- 1 tab(s) by mouth 2 times a day MDD:2  -- Do not drink alcoholic beverages when taking this medication.  Finish all this medication unless otherwise directed by prescriber.  May discolor urine or feces.    -- Indication: For ABDOMINOPELVIC ABSCESS    acetaminophen 325 mg oral tablet  -- 1 tab(s) by mouth every 4 hours, As needed, For Temp greater than 38 C (100.4 F)  -- Indication: For pain    acetaminophen 325 mg oral tablet  -- 1 tab(s) by mouth every 4 hours, As needed, Mild Pain (1 - 3)  -- Indication: For pain    ibuprofen 800 mg oral tablet  -- 1 tab(s) by mouth every 6 hours, As needed, moderate pain  -- Indication: For pain    Levaquin 750 mg oral tablet  -- 1 tab(s) by mouth once a day MDD:1  -- Avoid prolonged or excessive exposure to direct and/or artificial sunlight while taking this medication.  Do not take dairy products, antacids, or iron preparations within one hour of this medication.  Finish all this medication unless otherwise directed by prescriber.  May cause drowsiness or dizziness.  Medication should be taken with plenty of water.    -- Indication: For ABDOMINOPELVIC ABSCESS

## 2018-03-14 NOTE — DISCHARGE NOTE ADULT - PLAN OF CARE
Complete recovery, follow up in the surgical clinic Please call the surgical clinic at the number provided below and schedule a follow up visit in 1-2 weeks  Continue to take the antibiotics as prescribed, please complete both prescriptions: Flagyl 500mg BID for 7 days, Levaquin 750mg qdaily for 14 days  Please take tylenol and motrin for pain

## 2018-03-14 NOTE — PROGRESS NOTE ADULT - SUBJECTIVE AND OBJECTIVE BOX
SHIELA VERMA  33y, Female      OVERNIGHT EVENTS:    no fevers, minimal abdominal pain.    VITALS:  T(F): 97, Max: 98.5 (03-14-18 @ 00:00)  HR: 73  BP: 104/63  RR: 20Vital Signs Last 24 Hrs  T(C): 36.1 (14 Mar 2018 07:49), Max: 36.9 (14 Mar 2018 00:00)  T(F): 97 (14 Mar 2018 07:49), Max: 98.5 (14 Mar 2018 00:00)  HR: 73 (14 Mar 2018 07:49) (73 - 97)  BP: 104/63 (14 Mar 2018 07:49) (92/57 - 135/79)  BP(mean): --  RR: 20 (14 Mar 2018 07:49) (16 - 20)  SpO2: --    TESTS & MEASUREMENTS:                        9.7    6.88  )-----------( 535      ( 14 Mar 2018 09:24 )             28.9     03-13    127<L>  |  94<L>  |  <5<L>  ----------------------------<  101  3.8   |  25  |  0.4<L>    Ca    8.4<L>      13 Mar 2018 08:48  Phos  4.4     03-13  Mg     1.8     03-13          Culture - Fungal, Body Fluid (collected 03-12-18 @ 19:00)  Source: .Body Fluid Left DIMITRY drain  Preliminary Report (03-14-18 @ 06:45):    Testing in progress    Culture - Urine (collected 03-11-18 @ 12:56)  Source: .Urine Clean Catch (Midstream)  Final Report (03-12-18 @ 22:53):    10,000 - 49,000 CFU/mL Presumptive Candida albicans    Culture - Blood (collected 03-11-18 @ 12:11)  Source: .Blood None  Preliminary Report (03-13-18 @ 01:01):    No growth to date.    Culture - Body Fluid with Gram Stain (collected 03-11-18 @ 11:51)  Source: .Body Fluid None  Gram Stain (03-12-18 @ 06:48):    polymorphonuclear leukocytes seen    No organisms seen    by cytocentrifuge  Preliminary Report (03-13-18 @ 19:24):    Rare Haemophilus parainfluenzae    Growth in fluid media only Coag Negative Staphylococcus    Culture - Surgical Swab (collected 03-10-18 @ 17:55)  Source: .Surgical Swab r side of abdominal incision  Preliminary Report (03-12-18 @ 17:49):    Growth in fluid media only Coag Negative Staphylococcus  Organism: Coag Negative Staphylococcus (03-13-18 @ 18:58)  Organism: Coag Negative Staphylococcus (03-13-18 @ 18:58)      -  Ampicillin/Sulbactam: R <=8/4      -  Cefazolin: R <=4      -  Ciprofloxacin: R >2      -  Clindamycin: S <=0.25      -  Erythromycin: R >4      -  Gentamicin: S <=1      -  Levofloxacin: R >4      -  Moxifloxacin(Aerobic): R 2      -  Oxacillin: R >2      -  Penicillin: R >8      -  RIF- Rifampin: S <=1      -  Tetra/Doxy: R >8      -  Trimethoprim/Sulfamethoxazole: S <=0.5/9.5      -  Vancomycin: S 2      Method Type: LUZ MARINA            RADIOLOGY & ADDITIONAL TESTS:    ANTIBIOTICS:  fluconAZOLE IVPB      fluconAZOLE IVPB 200 milliGRAM(s) IV Intermittent every 24 hours  levoFLOXacin IVPB 500 milliGRAM(s) IV Intermittent every 24 hours  levoFLOXacin IVPB      metroNIDAZOLE  IVPB      metroNIDAZOLE  IVPB 500 milliGRAM(s) IV Intermittent every 8 hours

## 2018-03-14 NOTE — DISCHARGE NOTE ADULT - HOSPITAL COURSE
34 yo F s/p recent Whipple procedure, pancreatic stent placement for pseudopapillary tumor of pancreatic head on 02/05/18. 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 Was seen by Dr. Hudson last week and was  doing ok. Yesterday, started to have fever 101, purulent discharge from drain. Had CT A/P done while in ED, demonstrated	Interval development of an early abscess within the surgical bed, measuring 4.5 x 3.2 x 2.9 cm. The L DIMITRY drain placed surgically at the time of the original procedure continued to drain purulent fluid communicating with this collection. She was started on 3 IV antibiotics: cefepime, vancomycin, and fluconazole, with improvement in her labs and clinically on exam. She was able to ambulate, has been afebrile for >48h, and is tolerating diet. +flatus/BM. Otherwise doing well and cleared surgically for discharge home. She will omplete an additional 2 weeks of PO levaquinn and 1 week of PO metronidazole. She will follow up in the surgical clinic in 1 week

## 2018-03-15 DIAGNOSIS — K65.1 PERITONEAL ABSCESS: ICD-10-CM

## 2018-03-15 DIAGNOSIS — K21.9 GASTRO-ESOPHAGEAL REFLUX DISEASE WITHOUT ESOPHAGITIS: ICD-10-CM

## 2018-03-15 DIAGNOSIS — C25.8 MALIGNANT NEOPLASM OF OVERLAPPING SITES OF PANCREAS: ICD-10-CM

## 2018-03-15 DIAGNOSIS — T81.4XXA INFECTION FOLLOWING A PROCEDURE, INITIAL ENCOUNTER: ICD-10-CM
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
## 2018-03-15 DIAGNOSIS — B96.89 OTHER SPECIFIED BACTERIAL AGENTS AS THE CAUSE OF DISEASES CLASSIFIED ELSEWHERE: ICD-10-CM

## 2018-03-15 LAB
CULTURE RESULTS: SIGNIFICANT CHANGE UP
ORGANISM # SPEC MICROSCOPIC CNT: SIGNIFICANT CHANGE UP
ORGANISM # SPEC MICROSCOPIC CNT: SIGNIFICANT CHANGE UP
SPECIMEN SOURCE: SIGNIFICANT CHANGE UP

## 2018-03-15 RX ORDER — AZTREONAM 2 G
1 VIAL (EA) INJECTION
Qty: 28 | Refills: 0 | OUTPATIENT
Start: 2018-03-15 | End: 2018-03-28

## 2018-03-17 LAB
CULTURE RESULTS: SIGNIFICANT CHANGE UP
SPECIMEN SOURCE: SIGNIFICANT CHANGE UP

## 2018-03-21 ENCOUNTER — APPOINTMENT (OUTPATIENT)
Dept: SURGERY | Facility: CLINIC | Age: 33
End: 2018-03-21
Payer: COMMERCIAL

## 2018-03-21 VITALS
WEIGHT: 151 LBS | BODY MASS INDEX: 25.78 KG/M2 | HEIGHT: 64 IN | DIASTOLIC BLOOD PRESSURE: 72 MMHG | SYSTOLIC BLOOD PRESSURE: 116 MMHG

## 2018-03-21 PROBLEM — Z98.890 OTHER SPECIFIED POSTPROCEDURAL STATES: Chronic | Status: ACTIVE | Noted: 2018-03-10

## 2018-03-21 PROCEDURE — 99024 POSTOP FOLLOW-UP VISIT: CPT

## 2018-03-30 ENCOUNTER — APPOINTMENT (OUTPATIENT)
Dept: SURGERY | Facility: CLINIC | Age: 33
End: 2018-03-30
Payer: COMMERCIAL

## 2018-03-30 VITALS
BODY MASS INDEX: 25.44 KG/M2 | HEIGHT: 64 IN | DIASTOLIC BLOOD PRESSURE: 78 MMHG | SYSTOLIC BLOOD PRESSURE: 114 MMHG | WEIGHT: 149 LBS

## 2018-03-30 PROCEDURE — 99024 POSTOP FOLLOW-UP VISIT: CPT

## 2018-04-04 ENCOUNTER — APPOINTMENT (OUTPATIENT)
Dept: SURGERY | Facility: CLINIC | Age: 33
End: 2018-04-04
Payer: COMMERCIAL

## 2018-04-04 VITALS
DIASTOLIC BLOOD PRESSURE: 74 MMHG | BODY MASS INDEX: 25.78 KG/M2 | HEIGHT: 64 IN | WEIGHT: 151 LBS | SYSTOLIC BLOOD PRESSURE: 118 MMHG

## 2018-04-04 PROCEDURE — 99024 POSTOP FOLLOW-UP VISIT: CPT

## 2018-04-11 LAB
CULTURE RESULTS: SIGNIFICANT CHANGE UP
SPECIMEN SOURCE: SIGNIFICANT CHANGE UP

## 2018-04-13 ENCOUNTER — APPOINTMENT (OUTPATIENT)
Dept: HEMATOLOGY ONCOLOGY | Facility: CLINIC | Age: 33
End: 2018-04-13

## 2018-04-13 ENCOUNTER — LABORATORY RESULT (OUTPATIENT)
Age: 33
End: 2018-04-13

## 2018-04-13 VITALS
DIASTOLIC BLOOD PRESSURE: 69 MMHG | SYSTOLIC BLOOD PRESSURE: 112 MMHG | HEART RATE: 80 BPM | WEIGHT: 148 LBS | TEMPERATURE: 98.4 F | BODY MASS INDEX: 25.27 KG/M2 | HEIGHT: 64 IN

## 2018-04-13 LAB
HCT VFR BLD CALC: 33.9 %
HGB BLD-MCNC: 10.8 G/DL
MCHC RBC-ENTMCNC: 26.9 PG
MCHC RBC-ENTMCNC: 31.9 G/DL
MCV RBC AUTO: 84.5 FL
PLATELET # BLD AUTO: 346 K/UL
PMV BLD: 10.1 FL
RBC # BLD: 4.01 M/UL
RBC # FLD: 13.9 %
WBC # FLD AUTO: 8.05 K/UL

## 2018-04-16 LAB
ALBUMIN SERPL ELPH-MCNC: 4.1 G/DL
ALP BLD-CCNC: 128 U/L
ALT SERPL-CCNC: 46 U/L
ANION GAP SERPL CALC-SCNC: 16 MMOL/L
AST SERPL-CCNC: 32 U/L
BILIRUB SERPL-MCNC: 0.3 MG/DL
BUN SERPL-MCNC: 10 MG/DL
CALCIUM SERPL-MCNC: 9.3 MG/DL
CANCER AG19-9 SERPL-ACNC: 16 U/ML
CHLORIDE SERPL-SCNC: 104 MMOL/L
CO2 SERPL-SCNC: 22 MMOL/L
CREAT SERPL-MCNC: 0.5 MG/DL
FERRITIN SERPL-MCNC: 38 NG/ML
GLUCOSE SERPL-MCNC: 74 MG/DL
POTASSIUM SERPL-SCNC: 4.4 MMOL/L
PROT SERPL-MCNC: 7.3 G/DL
SODIUM SERPL-SCNC: 142 MMOL/L
VIT B12 SERPL-MCNC: 454 PG/ML

## 2018-04-18 ENCOUNTER — APPOINTMENT (OUTPATIENT)
Dept: SURGERY | Facility: CLINIC | Age: 33
End: 2018-04-18
Payer: COMMERCIAL

## 2018-04-18 VITALS
HEIGHT: 64 IN | BODY MASS INDEX: 25.61 KG/M2 | WEIGHT: 150 LBS | SYSTOLIC BLOOD PRESSURE: 118 MMHG | DIASTOLIC BLOOD PRESSURE: 72 MMHG

## 2018-04-18 PROCEDURE — 99024 POSTOP FOLLOW-UP VISIT: CPT

## 2018-04-27 ENCOUNTER — APPOINTMENT (OUTPATIENT)
Dept: SURGERY | Facility: CLINIC | Age: 33
End: 2018-04-27
Payer: MEDICAID

## 2018-04-27 VITALS
BODY MASS INDEX: 25.61 KG/M2 | WEIGHT: 150 LBS | HEIGHT: 64 IN | SYSTOLIC BLOOD PRESSURE: 116 MMHG | DIASTOLIC BLOOD PRESSURE: 68 MMHG

## 2018-04-27 PROCEDURE — 99024 POSTOP FOLLOW-UP VISIT: CPT

## 2018-05-04 ENCOUNTER — APPOINTMENT (OUTPATIENT)
Dept: SURGERY | Facility: CLINIC | Age: 33
End: 2018-05-04
Payer: MEDICAID

## 2018-05-04 VITALS
BODY MASS INDEX: 25.78 KG/M2 | WEIGHT: 151 LBS | DIASTOLIC BLOOD PRESSURE: 64 MMHG | SYSTOLIC BLOOD PRESSURE: 116 MMHG | HEIGHT: 64 IN

## 2018-05-04 PROCEDURE — 99024 POSTOP FOLLOW-UP VISIT: CPT

## 2018-05-25 ENCOUNTER — APPOINTMENT (OUTPATIENT)
Dept: SURGERY | Facility: CLINIC | Age: 33
End: 2018-05-25
Payer: COMMERCIAL

## 2018-05-25 VITALS
BODY MASS INDEX: 26.46 KG/M2 | WEIGHT: 155 LBS | DIASTOLIC BLOOD PRESSURE: 78 MMHG | HEIGHT: 64 IN | SYSTOLIC BLOOD PRESSURE: 122 MMHG

## 2018-05-25 PROCEDURE — 99024 POSTOP FOLLOW-UP VISIT: CPT

## 2018-05-25 NOTE — ASU DISCHARGE PLAN (ADULT/PEDIATRIC). - CONDITIONS AT DISCHARGE
Patient called to inquire about taking supplements to assist with weight loss. Please call patient.    Stable

## 2018-06-03 NOTE — PATIENT PROFILE ADULT. - HEALTHCARE INFORMATION NEEDED, PROFILE
Initial Anesthesia Post-op Note    Patient: Ryland Hernandez  Procedure(s) Performed:  SECTION  Anesthesia type: L&D Epidural to     Vital Last Value   Temperature 35.9 °C (96.6 °F) (18)   Pulse 99 (18)   Respiratory Rate 16 (18)   Non-Invasive   Blood Pressure 96/53 (18)   Arterial  Blood Pressure     Pulse Oximetry 100 % (18)     Last 24 I/O:   Intake/Output Summary (Last 24 hours) at 18  Last data filed at 18 2100   Gross per 24 hour   Intake              950 ml   Output              600 ml   Net              350 ml       PATIENT LOCATION: PACU Phase 1  POST-OP VITAL SIGNS: stable  LEVEL OF CONSCIOUSNESS: participates in exam, answers questions appropriately, awake and oriented  RESPIRATORY STATUS: spontaneous ventilation  CARDIOVASCULAR: stable  HYDRATION: euvolemic    PAIN MANAGEMENT: adequately controlled  NAUSEA: None  AIRWAY PATENCY: patent  POST-OP ASSESSMENT: no complications, patient tolerated procedure well with no complications and sufficiently recovered from acute administration of anesthesia effects and able to participate in evaluation  COMPLICATIONS: none  Comments: Patient is doing well.  Vital sign stable.  No immediate anesthesia complications.      
[place]an speaking

## 2018-06-12 ENCOUNTER — OUTPATIENT (OUTPATIENT)
Dept: OUTPATIENT SERVICES | Facility: HOSPITAL | Age: 33
LOS: 1 days | Discharge: HOME | End: 2018-06-12

## 2018-06-12 ENCOUNTER — APPOINTMENT (OUTPATIENT)
Dept: INTERNAL MEDICINE | Facility: CLINIC | Age: 33
End: 2018-06-12

## 2018-06-12 VITALS
WEIGHT: 154 LBS | SYSTOLIC BLOOD PRESSURE: 109 MMHG | BODY MASS INDEX: 26.29 KG/M2 | DIASTOLIC BLOOD PRESSURE: 75 MMHG | HEIGHT: 64 IN | HEART RATE: 58 BPM

## 2018-06-12 DIAGNOSIS — Z87.891 PERSONAL HISTORY OF NICOTINE DEPENDENCE: ICD-10-CM

## 2018-06-12 DIAGNOSIS — Z85.07 PERSONAL HISTORY OF MALIGNANT NEOPLASM OF PANCREAS: Chronic | ICD-10-CM

## 2018-06-12 RX ORDER — AZITHROMYCIN 250 MG/1
250 TABLET, FILM COATED ORAL
Qty: 6 | Refills: 0 | Status: COMPLETED | COMMUNITY
Start: 2017-12-02 | End: 2017-12-07

## 2018-06-12 RX ORDER — LEVOFLOXACIN 500 MG/1
500 TABLET, FILM COATED ORAL
Refills: 0 | Status: DISCONTINUED | COMMUNITY
End: 2018-06-12

## 2018-06-12 NOTE — HISTORY OF PRESENT ILLNESS
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 currently she has no complaints, is not taking any medications. she denies any weight loss, fevers, night sweats or pain. she has an appointment with oncology on thursday, and will likely have a repeat ct scan of her abdomen.

## 2018-06-12 NOTE — PHYSICAL EXAM
[No Acute Distress] : no acute distress [Well Nourished] : well nourished [Well Developed] : well developed [Well-Appearing] : well-appearing [Normal Sclera/Conjunctiva] : normal sclera/conjunctiva [PERRL] : pupils equal round and reactive to light [EOMI] : extraocular movements intact [Normal Outer Ear/Nose] : the outer ears and nose were normal in appearance [Normal Oropharynx] : the oropharynx was normal [No JVD] : no jugular venous distention [Supple] : supple [No Lymphadenopathy] : no lymphadenopathy [Thyroid Normal, No Nodules] : the thyroid was normal and there were no nodules present [No Respiratory Distress] : no respiratory distress  [Clear to Auscultation] : lungs were clear to auscultation bilaterally [No Accessory Muscle Use] : no accessory muscle use [Normal Rate] : normal rate  [Regular Rhythm] : with a regular rhythm [Normal S1, S2] : normal S1 and S2 [No Murmur] : no murmur heard [No Varicosities] : no varicosities [Pedal Pulses Present] : the pedal pulses are present [No Edema] : there was no peripheral edema [No Extremity Clubbing/Cyanosis] : no extremity clubbing/cyanosis [No Palpable Aorta] : no palpable aorta [Soft] : abdomen soft [Non Tender] : non-tender [Non-distended] : non-distended [No Masses] : no abdominal mass palpated [No HSM] : no HSM [Normal Bowel Sounds] : normal bowel sounds [Normal Posterior Cervical Nodes] : no posterior cervical lymphadenopathy [Normal Anterior Cervical Nodes] : no anterior cervical lymphadenopathy [No Joint Swelling] : no joint swelling [Grossly Normal Strength/Tone] : grossly normal strength/tone [No Rash] : no rash [Normal Gait] : normal gait [Coordination Grossly Intact] : coordination grossly intact [No Focal Deficits] : no focal deficits [Deep Tendon Reflexes (DTR)] : deep tendon reflexes were 2+ and symmetric [Normal Affect] : the affect was normal [Alert and Oriented x3] : oriented to person, place, and time [Normal Insight/Judgement] : insight and judgment were intact [de-identified] : surgical scar across abdomen

## 2018-06-12 NOTE — PLAN
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 currently she has no complaints, is not taking any medications. she denies any weight loss, fevers, night sweats or pain. 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 since dec 8 2014, unchanged 5.9 cm hypodense pancreatic head mass\par 2. no enlarged abdominal or pelvis lymph nodes\par 3. diffuse hepatic steatosis. follow LFT's\par patient appears to be doing well.\par \par #HCM\par last pap smear done Jan 11, 2017- low grade squamous intraepithelial lesion with high risk HPV\par - routine labs \par

## 2018-06-14 ENCOUNTER — APPOINTMENT (OUTPATIENT)
Dept: HEMATOLOGY ONCOLOGY | Facility: CLINIC | Age: 33
End: 2018-06-14

## 2018-06-14 ENCOUNTER — OUTPATIENT (OUTPATIENT)
Dept: OUTPATIENT SERVICES | Facility: HOSPITAL | Age: 33
LOS: 1 days | Discharge: HOME | End: 2018-06-14

## 2018-06-14 ENCOUNTER — LABORATORY RESULT (OUTPATIENT)
Age: 33
End: 2018-06-14

## 2018-06-14 VITALS
HEART RATE: 61 BPM | DIASTOLIC BLOOD PRESSURE: 67 MMHG | BODY MASS INDEX: 26.29 KG/M2 | WEIGHT: 154 LBS | HEIGHT: 64 IN | TEMPERATURE: 87.8 F | SYSTOLIC BLOOD PRESSURE: 92 MMHG | RESPIRATION RATE: 14 BRPM

## 2018-06-14 DIAGNOSIS — Z85.07 PERSONAL HISTORY OF MALIGNANT NEOPLASM OF PANCREAS: Chronic | ICD-10-CM

## 2018-06-14 DIAGNOSIS — C25.0 MALIGNANT NEOPLASM OF HEAD OF PANCREAS: ICD-10-CM

## 2018-06-16 LAB
ALBUMIN SERPL ELPH-MCNC: 4.1 G/DL
ALP BLD-CCNC: 133 U/L
ALT SERPL-CCNC: 46 U/L
ANION GAP SERPL CALC-SCNC: 11 MMOL/L
AST SERPL-CCNC: 26 U/L
BILIRUB SERPL-MCNC: 0.2 MG/DL
BUN SERPL-MCNC: 14 MG/DL
CALCIUM SERPL-MCNC: 8.3 MG/DL
CANCER AG19-9 SERPL-ACNC: 13.6 U/ML
CHLORIDE SERPL-SCNC: 103 MMOL/L
CO2 SERPL-SCNC: 26 MMOL/L
CREAT SERPL-MCNC: 0.5 MG/DL
FERRITIN SERPL-MCNC: 9 NG/ML
GLUCOSE SERPL-MCNC: 97 MG/DL
HCT VFR BLD CALC: 31.5 %
HGB BLD-MCNC: 10.1 G/DL
MCHC RBC-ENTMCNC: 25.3 PG
MCHC RBC-ENTMCNC: 32.1 G/DL
MCV RBC AUTO: 78.9 FL
PLATELET # BLD AUTO: 278 K/UL
PMV BLD: 11.1 FL
POTASSIUM SERPL-SCNC: 4.2 MMOL/L
PROT SERPL-MCNC: 7.3 G/DL
RBC # BLD: 3.99 M/UL
RBC # FLD: 14.9 %
SODIUM SERPL-SCNC: 140 MMOL/L
WBC # FLD AUTO: 5.47 K/UL

## 2018-08-16 ENCOUNTER — APPOINTMENT (OUTPATIENT)
Dept: HEMATOLOGY ONCOLOGY | Facility: CLINIC | Age: 33
End: 2018-08-16

## 2018-08-16 PROBLEM — Z87.19 PERSONAL HISTORY OF OTHER DISEASES OF THE DIGESTIVE SYSTEM: Chronic | Status: ACTIVE | Noted: 2018-02-07

## 2018-08-21 ENCOUNTER — LABORATORY RESULT (OUTPATIENT)
Age: 33
End: 2018-08-21

## 2018-08-21 ENCOUNTER — APPOINTMENT (OUTPATIENT)
Dept: HEMATOLOGY ONCOLOGY | Facility: CLINIC | Age: 33
End: 2018-08-21

## 2018-08-21 ENCOUNTER — APPOINTMENT (OUTPATIENT)
Dept: INTERNAL MEDICINE | Facility: CLINIC | Age: 33
End: 2018-08-21

## 2018-08-21 VITALS
BODY MASS INDEX: 26.29 KG/M2 | SYSTOLIC BLOOD PRESSURE: 113 MMHG | WEIGHT: 154 LBS | DIASTOLIC BLOOD PRESSURE: 78 MMHG | HEART RATE: 75 BPM | HEIGHT: 64 IN | TEMPERATURE: 97.8 F | RESPIRATION RATE: 14 BRPM

## 2018-08-22 LAB
ALBUMIN SERPL ELPH-MCNC: 4.3 G/DL
ALP BLD-CCNC: 139 U/L
ALT SERPL-CCNC: 52 U/L
ANION GAP SERPL CALC-SCNC: 14 MMOL/L
AST SERPL-CCNC: 40 U/L
BILIRUB SERPL-MCNC: 0.6 MG/DL
BUN SERPL-MCNC: 12 MG/DL
CALCIUM SERPL-MCNC: 9.3 MG/DL
CHLORIDE SERPL-SCNC: 103 MMOL/L
CO2 SERPL-SCNC: 23 MMOL/L
CREAT SERPL-MCNC: 0.5 MG/DL
GLUCOSE SERPL-MCNC: 115 MG/DL
HCT VFR BLD CALC: 36.1 %
HGB BLD-MCNC: 12 G/DL
IRON SATN MFR SERPL: 29 %
IRON SERPL-MCNC: 120 UG/DL
MCHC RBC-ENTMCNC: 26.6 PG
MCHC RBC-ENTMCNC: 33.2 G/DL
MCV RBC AUTO: 80 FL
PLATELET # BLD AUTO: 280 K/UL
PMV BLD: 10.4 FL
POTASSIUM SERPL-SCNC: 5.2 MMOL/L
PROT SERPL-MCNC: 7.5 G/DL
RBC # BLD: 4.51 M/UL
RBC # FLD: 16 %
SODIUM SERPL-SCNC: 140 MMOL/L
TIBC SERPL-MCNC: 420 UG/DL
UIBC SERPL-MCNC: 300 UG/DL
WBC # FLD AUTO: 5.38 K/UL

## 2018-08-23 LAB — FERRITIN SERPL-MCNC: 12 NG/ML

## 2018-08-27 ENCOUNTER — OUTPATIENT (OUTPATIENT)
Dept: OUTPATIENT SERVICES | Facility: HOSPITAL | Age: 33
LOS: 1 days | Discharge: HOME | End: 2018-08-27

## 2018-08-27 DIAGNOSIS — Z85.07 PERSONAL HISTORY OF MALIGNANT NEOPLASM OF PANCREAS: Chronic | ICD-10-CM

## 2018-08-27 DIAGNOSIS — C80.1 MALIGNANT (PRIMARY) NEOPLASM, UNSPECIFIED: ICD-10-CM

## 2018-09-10 ENCOUNTER — OUTPATIENT (OUTPATIENT)
Dept: OUTPATIENT SERVICES | Facility: HOSPITAL | Age: 33
LOS: 1 days | Discharge: HOME | End: 2018-09-10

## 2018-09-10 ENCOUNTER — APPOINTMENT (OUTPATIENT)
Dept: HEMATOLOGY ONCOLOGY | Facility: CLINIC | Age: 33
End: 2018-09-10

## 2018-09-10 VITALS
WEIGHT: 154 LBS | HEIGHT: 64 IN | BODY MASS INDEX: 26.29 KG/M2 | TEMPERATURE: 97.8 F | HEART RATE: 56 BPM | DIASTOLIC BLOOD PRESSURE: 79 MMHG | SYSTOLIC BLOOD PRESSURE: 116 MMHG | RESPIRATION RATE: 14 BRPM

## 2018-09-10 DIAGNOSIS — Z85.07 PERSONAL HISTORY OF MALIGNANT NEOPLASM OF PANCREAS: Chronic | ICD-10-CM

## 2018-09-10 DIAGNOSIS — C25.0 MALIGNANT NEOPLASM OF HEAD OF PANCREAS: ICD-10-CM

## 2018-09-14 ENCOUNTER — APPOINTMENT (OUTPATIENT)
Dept: SURGERY | Facility: CLINIC | Age: 33
End: 2018-09-14
Payer: SUBSIDIZED

## 2018-09-14 VITALS
HEIGHT: 64 IN | SYSTOLIC BLOOD PRESSURE: 120 MMHG | WEIGHT: 161 LBS | DIASTOLIC BLOOD PRESSURE: 78 MMHG | BODY MASS INDEX: 27.49 KG/M2

## 2018-09-14 DIAGNOSIS — E66.3 OVERWEIGHT: ICD-10-CM

## 2018-09-14 PROCEDURE — 99214 OFFICE O/P EST MOD 30 MIN: CPT

## 2018-10-01 ENCOUNTER — OUTPATIENT (OUTPATIENT)
Dept: OUTPATIENT SERVICES | Facility: HOSPITAL | Age: 33
LOS: 1 days | Discharge: HOME | End: 2018-10-01

## 2018-10-01 ENCOUNTER — APPOINTMENT (OUTPATIENT)
Dept: OBGYN | Facility: CLINIC | Age: 33
End: 2018-10-01

## 2018-10-01 VITALS
BODY MASS INDEX: 27.49 KG/M2 | SYSTOLIC BLOOD PRESSURE: 90 MMHG | WEIGHT: 161 LBS | HEIGHT: 64 IN | DIASTOLIC BLOOD PRESSURE: 60 MMHG

## 2018-10-01 DIAGNOSIS — R87.612 LOW GRADE SQUAMOUS INTRAEPITHELIAL LESION ON CYTOLOGIC SMEAR OF CERVIX (LGSIL): ICD-10-CM

## 2018-10-01 DIAGNOSIS — Z85.07 PERSONAL HISTORY OF MALIGNANT NEOPLASM OF PANCREAS: Chronic | ICD-10-CM

## 2018-10-02 DIAGNOSIS — Z01.419 ENCOUNTER FOR GYNECOLOGICAL EXAMINATION (GENERAL) (ROUTINE) WITHOUT ABNORMAL FINDINGS: ICD-10-CM

## 2018-10-05 LAB — HPV HIGH+LOW RISK DNA PNL CVX: NOT DETECTED

## 2018-12-10 ENCOUNTER — OUTPATIENT (OUTPATIENT)
Dept: OUTPATIENT SERVICES | Facility: HOSPITAL | Age: 33
LOS: 1 days | Discharge: HOME | End: 2018-12-10

## 2018-12-10 ENCOUNTER — LABORATORY RESULT (OUTPATIENT)
Age: 33
End: 2018-12-10

## 2018-12-10 ENCOUNTER — APPOINTMENT (OUTPATIENT)
Dept: HEMATOLOGY ONCOLOGY | Facility: CLINIC | Age: 33
End: 2018-12-10

## 2018-12-10 ENCOUNTER — MED ADMIN CHARGE (OUTPATIENT)
Age: 33
End: 2018-12-10

## 2018-12-10 ENCOUNTER — APPOINTMENT (OUTPATIENT)
Dept: INTERNAL MEDICINE | Facility: CLINIC | Age: 33
End: 2018-12-10

## 2018-12-10 VITALS
TEMPERATURE: 96.9 F | BODY MASS INDEX: 27.31 KG/M2 | WEIGHT: 160 LBS | HEIGHT: 64 IN | DIASTOLIC BLOOD PRESSURE: 71 MMHG | SYSTOLIC BLOOD PRESSURE: 115 MMHG | RESPIRATION RATE: 14 BRPM | HEART RATE: 61 BPM

## 2018-12-10 DIAGNOSIS — Z85.07 PERSONAL HISTORY OF MALIGNANT NEOPLASM OF PANCREAS: Chronic | ICD-10-CM

## 2018-12-10 LAB
HCT VFR BLD CALC: 32.9 %
HGB BLD-MCNC: 11.1 G/DL
MCHC RBC-ENTMCNC: 28 PG
MCHC RBC-ENTMCNC: 33.7 G/DL
MCV RBC AUTO: 82.9 FL
PLATELET # BLD AUTO: 252 K/UL
PMV BLD: 11.2 FL
RBC # BLD: 3.97 M/UL
RBC # FLD: 14.3 %
WBC # FLD AUTO: 5.86 K/UL

## 2018-12-11 DIAGNOSIS — C25.0 MALIGNANT NEOPLASM OF HEAD OF PANCREAS: ICD-10-CM

## 2018-12-11 LAB
CANCER AG19-9 SERPL-ACNC: 12.5 U/ML
FERRITIN SERPL-MCNC: 7 NG/ML

## 2018-12-11 NOTE — ASSESSMENT
[FreeTextEntry1] : 32yo F with solid pseudo-papillary neoplasm of head of  pancreas pT2 pN0 s/p R0 resection  and mild anemia responding to iron replacement.CT A/P 8/18 demonstrates TARYN \par \par PLAN:\par Solid pseudo-papillary neoplasm of head of  pancreas:\par - pT2 pN0 s/p R0 resection\par - repeat CT A/P 8/18: TARYN\par - CA-19-9 13.3\par - Will repeat CT in 3 months, will repeat CA 19-9, CEA \par \par Iron Deficiency Anemia:\par - On oral iron once daily \par - Hg 11 previously  12,  last ferritin 12 \par - Will recheck ferritin and iron studies \par \par

## 2018-12-11 NOTE — HISTORY OF PRESENT ILLNESS
[de-identified] : \par 32 yo female , Kazakh speaking, born in [place] , interviewed with  # [id number]. Patient with  with no significant  significant medical history . S/p pancreaticoduodenectomy, pylorus-preserving R0 resection of  for pancreatic mass diagnosed when she presented with epigastric and periumbilical swelling for 3 months.  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  surgery complicated with a sepsis - possibly from aspiration pneumonia. \par type A pancreatic fistula and early resolution of delayed gastric emptying. She continues to have drainage 10 to 20cc daily . She denies fever, nausea, vomiting , abdominal pain or diarrhea. \par \par \par \par  [de-identified] : 06/14/2018 patient returns for follow up , she feels well , no abdominal pain or weight loss. no nausea or vomiting . Blood work was notable for mild normocytic anemia , Ferritin 38 . She denies hematochezia or heavy menses. \par \par 08/21/2018 : Patient returns for follow up , she is on iron one daily , Hb improved to 12. She complains of slight increase in epigastric discomfort , no nausea, vomiting , pruritis, change in bowel habits. no weight loss. \par \par 9/10/18\par Patient here today for follow up \par States she is concerned because she has had a pulling sensation in the abdomen along the site of the surgical scar, has no change in appetite or bowel habits, pain is worse upon movement not on eating\par she also complains of intermittent shortness of breath, no fever, no cough, no sore throat \par Patient had CT A/P 8/18 which demonstrated no evidence of disease \par \par 12/10/18\par Feels well and has no complaints\par She has had some foul smelling urine with some pain over the bladder\par Denies any other abdominal pain, diarrhea, fevers, chills, \par Continues on oral iron

## 2018-12-11 NOTE — PHYSICAL EXAM
[Fully active, able to carry on all pre-disease performance without restriction] : Status 0 - Fully active, able to carry on all pre-disease performance without restriction [Normal] : normal appearance, no rash, nodules, vesicles, ulcers, erythema [de-identified] : healed scar of surgery , no tenderness or redness. no palpable masses.

## 2018-12-14 ENCOUNTER — LABORATORY RESULT (OUTPATIENT)
Age: 33
End: 2018-12-14

## 2018-12-14 LAB
APPEARANCE: ABNORMAL
BILIRUBIN URINE: NEGATIVE
BLOOD URINE: ABNORMAL
COLOR: YELLOW
GLUCOSE QUALITATIVE U: NEGATIVE MG/DL
KETONES URINE: NEGATIVE
LEUKOCYTE ESTERASE URINE: ABNORMAL
NITRITE URINE: NEGATIVE
PH URINE: 6
PROTEIN URINE: NEGATIVE MG/DL
SPECIFIC GRAVITY URINE: 1.01
UROBILINOGEN URINE: 0.2 MG/DL (ref 0.2–?)

## 2019-01-01 NOTE — ED PROVIDER NOTE - CHIEF COMPLAINT
Subjective:      Janae Clarke is a 5 days female here with parents. Patient brought in for well visit    History of Present Illness:    HPI   Girl Salima Clarke is a 5 days day old 39w2d   born to a mother who is a 35 y.o.       PRENATAL/NURSERY COURSE:  The pregnancy was complicated by AMA and Rh negative status.. Prenatal ultrasound revealed normal anatomy. Prenatal care was good. Mother received no medications. The delivery was uncomplicated, terminal meconium present  Vaginal    Hearing screen--passed  CHD screen--normal   Hep B given    Admission Weight: 3230 g (7 lb 1.9 oz)  Discharge Weight: Weight: 3075 g (6 lb 12.5 oz)  Weight Change Since Birth: -5%     Low risk TSB, 3.8 @ 24 hrs     PARENTAL CONCERNS TODAY:  Sometimes trembles a little bit and wondering if it's normal.    FEEDING/VOIDING/STOOLING:  Was latching in the hospital but then mom's nipples were sore.  Ended up giving formula over the weekend then mom started pumping.  Had 3 oz of EBM yesterday. Getting 1.5 to 2oz per feed.  This morning was able to latch without pain.  Needed suppository in the NBN but then yesterday started stooling again on her own.  Has had 3 stools in the past 24 hours.  This morning was light brown/yellow.    SLEEPING:   Back to sleep, in crib or bassinet--yes  Sleeping well between feeds--yes   Wakes to feed--yes    SOCIAL:    Baby lives with mom, dad.  1st baby , Mom builds houses.  Dad is CPA.  [place]  [place] [facility]    Review of Systems   Constitutional: Negative for activity change, appetite change, fever and irritability.   HENT: Negative for congestion, mouth sores and rhinorrhea.    Eyes: Negative for discharge and redness.   Respiratory: Negative for cough and wheezing.    Cardiovascular: Negative for leg swelling and cyanosis.   Gastrointestinal: Negative for constipation, diarrhea and vomiting.   Genitourinary: Negative for decreased urine volume and hematuria.  The patient is a 33y Female complaining of post op complication.   Musculoskeletal: Negative for extremity weakness.   Skin: Negative for rash and wound.       Objective:     Physical Exam   Constitutional: She appears well-developed and well-nourished. She is active.   HENT:   Head: Normocephalic and atraumatic. Anterior fontanelle is flat.   Right Ear: Tympanic membrane and external ear normal.   Left Ear: Tympanic membrane and external ear normal.   Mouth/Throat: Oropharynx is clear.   Eyes: Red reflex is present bilaterally. Pupils are equal, round, and reactive to light. Conjunctivae are normal.   Neck: Normal range of motion. Neck supple.   Cardiovascular: Normal rate, regular rhythm, S1 normal and S2 normal.   No murmur heard.  Pulses:       Brachial pulses are 2+ on the right side, and 2+ on the left side.       Femoral pulses are 2+ on the right side, and 2+ on the left side.  Pulmonary/Chest: Effort normal and breath sounds normal. There is normal air entry. No respiratory distress.   Abdominal: Soft. Bowel sounds are normal. She exhibits no distension and no abnormal umbilicus. The umbilical stump is clean. There is no hepatosplenomegaly. There is no tenderness.   Musculoskeletal: Normal range of motion.        Right hip: Normal.        Left hip: Normal.   Symmetric leg folds.   Neurological: She is alert. She exhibits normal muscle tone. Suck and root normal. Symmetric Britni.   Skin: Skin is warm. No rash noted. No jaundice.   Nursing note and vitals reviewed.      Assessment:        1. Encounter for routine child health examination without abnormal findings         Plan:     Janae was seen today for well child.    Diagnoses and all orders for this visit:    Encounter for routine child health examination without abnormal findings      ANTICIPATORY GUIDANCE:  Nutrition. Signs of illness. Injury prevention. Protect from crowds.    Breastmilk or formula only, no water, no solids, no honey.   Vitamin D supplements for exclusively  infants.   Notify doctor if  temp greater than 100.4, lethargy, irritability or other concerns.   Back to sleep in crib.   Rear facing car seat.    [facility]  after hours number is [phone]      Above birth weight--  Will check weight next week since mom planning to start latching again

## 2019-02-19 ENCOUNTER — APPOINTMENT (OUTPATIENT)
Dept: INTERNAL MEDICINE | Facility: CLINIC | Age: 34
End: 2019-02-19

## 2019-03-04 ENCOUNTER — LABORATORY RESULT (OUTPATIENT)
Age: 34
End: 2019-03-04

## 2019-03-04 ENCOUNTER — APPOINTMENT (OUTPATIENT)
Dept: HEMATOLOGY ONCOLOGY | Facility: CLINIC | Age: 34
End: 2019-03-04

## 2019-03-04 ENCOUNTER — OUTPATIENT (OUTPATIENT)
Dept: OUTPATIENT SERVICES | Facility: HOSPITAL | Age: 34
LOS: 1 days | Discharge: HOME | End: 2019-03-04

## 2019-03-04 VITALS
HEART RATE: 73 BPM | WEIGHT: 170 LBS | RESPIRATION RATE: 18 BRPM | TEMPERATURE: 97.8 F | DIASTOLIC BLOOD PRESSURE: 65 MMHG | BODY MASS INDEX: 29.02 KG/M2 | SYSTOLIC BLOOD PRESSURE: 84 MMHG | HEIGHT: 64 IN

## 2019-03-04 DIAGNOSIS — Z85.07 PERSONAL HISTORY OF MALIGNANT NEOPLASM OF PANCREAS: Chronic | ICD-10-CM

## 2019-03-04 LAB
HCT VFR BLD CALC: 36.1 %
HGB BLD-MCNC: 12.1 G/DL
MCHC RBC-ENTMCNC: 28.3 PG
MCHC RBC-ENTMCNC: 33.5 G/DL
MCV RBC AUTO: 84.5 FL
PLATELET # BLD AUTO: 291 K/UL
PMV BLD: 10.6 FL
RBC # BLD: 4.27 M/UL
RBC # FLD: 13.6 %
WBC # FLD AUTO: 5.82 K/UL

## 2019-03-04 NOTE — PHYSICAL EXAM
[Fully active, able to carry on all pre-disease performance without restriction] : Status 0 - Fully active, able to carry on all pre-disease performance without restriction [Normal] : normal appearance, no rash, nodules, vesicles, ulcers, erythema [de-identified] : healed scar of surgery , no tenderness or redness. no palpable masses.

## 2019-03-04 NOTE — ASSESSMENT
[FreeTextEntry1] : 34yo F with solid pseudo-papillary neoplasm of head of  pancreas pT2 pN0 s/p R0 resection  and mild anemia responding to iron replacement.CT A/P 8/18 demonstrates TARYN \par \par PLAN:\par Solid pseudo-papillary neoplasm of head of  pancreas:\par - pT2 pN0 s/p R0 resection. Surgery done in 02/2018. \par - CT A/P 8/18: TARYN. Repeat CT A/P. Prescription given.\par - Labs ordered. \par \par Iron Deficiency Anemia:\par - On oral iron once daily \par - CBC today showed improved Hgb to 12. \par \par RTC in 3 months.\par Pt seen and examined with \par \par \par

## 2019-03-04 NOTE — HISTORY OF PRESENT ILLNESS
[de-identified] : \par 32 yo female , Urdu speaking, born in [place] , interviewed with  # [id number]. Patient with  with no significant  significant medical history . S/p pancreaticoduodenectomy, pylorus-preserving R0 resection of  for pancreatic mass diagnosed when she presented with epigastric and periumbilical swelling for 3 months.  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  surgery complicated with a sepsis - possibly from aspiration pneumonia. \par type A pancreatic fistula and early resolution of delayed gastric emptying. She continues to have drainage 10 to 20cc daily . She denies fever, nausea, vomiting , abdominal pain or diarrhea. \par \par \par \par  [de-identified] : 06/14/2018 patient returns for follow up , she feels well , no abdominal pain or weight loss. no nausea or vomiting . Blood work was notable for mild normocytic anemia , Ferritin 38 . She denies hematochezia or heavy menses. \par \par 08/21/2018 : Patient returns for follow up , she is on iron one daily , Hb improved to 12. She complains of slight increase in epigastric discomfort , no nausea, vomiting , pruritis, change in bowel habits. no weight loss. \par \par 9/10/18\par Patient here today for follow up \par States she is concerned because she has had a pulling sensation in the abdomen along the site of the surgical scar, has no change in appetite or bowel habits, pain is worse upon movement not on eating\par she also complains of intermittent shortness of breath, no fever, no cough, no sore throat \par Patient had CT A/P 8/18 which demonstrated no evidence of disease \par \par 12/10/18\par Feels well and has no complaints\par She has had some foul smelling urine with some pain over the bladder\par Denies any other abdominal pain, diarrhea, fevers, chills, \par Continues on oral iron \par \par 3/4/19:\par Doing well. No major complaints.\par Denies fever, nausea, vomiting, chest pain, SOB, abdominal pain, bowel and bladder problems.\par On PO iron pill, one pill daily. \par

## 2019-03-05 LAB
ALBUMIN SERPL ELPH-MCNC: 4.2 G/DL
ALP BLD-CCNC: 143 U/L
ALT SERPL-CCNC: 47 U/L
ANION GAP SERPL CALC-SCNC: 13 MMOL/L
AST SERPL-CCNC: 31 U/L
BILIRUB SERPL-MCNC: 0.3 MG/DL
BUN SERPL-MCNC: 15 MG/DL
CALCIUM SERPL-MCNC: 8.9 MG/DL
CANCER AG19-9 SERPL-ACNC: 9 U/ML
CHLORIDE SERPL-SCNC: 105 MMOL/L
CO2 SERPL-SCNC: 24 MMOL/L
CREAT SERPL-MCNC: 0.6 MG/DL
GLUCOSE SERPL-MCNC: 75 MG/DL
POTASSIUM SERPL-SCNC: 5.3 MMOL/L
PROT SERPL-MCNC: 7.3 G/DL
SODIUM SERPL-SCNC: 142 MMOL/L

## 2019-03-06 ENCOUNTER — APPOINTMENT (OUTPATIENT)
Dept: SURGERY | Facility: CLINIC | Age: 34
End: 2019-03-06
Payer: MEDICAID

## 2019-03-06 VITALS
WEIGHT: 166 LBS | DIASTOLIC BLOOD PRESSURE: 78 MMHG | BODY MASS INDEX: 28.34 KG/M2 | SYSTOLIC BLOOD PRESSURE: 122 MMHG | HEIGHT: 64 IN

## 2019-03-06 PROCEDURE — 99214 OFFICE O/P EST MOD 30 MIN: CPT

## 2019-03-06 NOTE — ASSESSMENT
[FreeTextEntry1] : 32 yo female patient without significant medical history comes today for followup visit. S/p pancreaticoduodenctomy, pylorus-preserving, complicated with a sepsis - possibly from aspiration pneumonia. She was discharged with one surgical drain. \par She had seen my partner post op on March 5th. At that time she had a well controlled, type A pancreatic fistula and early resolution of delayed gastric emptying. She was draining 30 ml a day of cloudy fluid from her left DIMITRY. She was tolerating regular diet with occasional nausea and vomiting. She was passing gas and having normal BMs. She was here today with her family for followup visit. \par She was admitted with some pain. She underwent a CT scan , blood work and culture. She possibly had a combination of post op UTI or yana pancreatic fluid. She was later discharged home on Levofloxacin and then Bactrim was added. \par 3/21/18: he is doing very well. She has a good appetite and is eating more. She ambulating and wants to go back to work. \par Her incisions have healed very well. Minimal scabs are there. \par The right DIMITRY drain is dry. The left DIMITRY drain still has some cloudy fluids the last 5 readings have been 10 , 20 , 15, 10 , 10 ml. So it is decreasing. \par 3/30/18: She again was doing very well. She had a good appetite and is eating more. She ambulating and wanted to go back to work. \par Her incisions had healed very well. The right DIMITRY drain is dry. The left DIMITRY drain still has some cloudy fluids the last 6 readings have been 10,9,9,8,7,5 So it is still decreasing. \par 4/4/18:She was doing very well. She has a good appetite and is eating more. She ambulating and wants to go back to work. \par Her incisions have healed very well. The right DIMITRY drain site was dry. The left DIMITRY drain still has some cloudy fluids the last 5 readings have been 5 ml each. So it is still decreasing. \par She is eating well and having regular bowel movements that are brown and sink. She was asked to follow up in 2 weeks for drain removal. \par She has seen Dr. Murray and is going to get a CT scan in 3 months. \par 5/4 Her drain output has only been 10 ml. She has been asymptomatic. We thus removed the drain. \par May : She is doing very well. She is very hungry and eating a lot . She is gaining weight. \par 6 month follow up : She is doing very well. She is very hungry and eating a lot . She is gaining weight. \par She has some stretching in the anterior abdominal wall when sneezing and coughing. \par We reviewed her latest labs and CT that were done by Dr. Murray\par \par \par We explained in great detail the pathophysiology of the disease process. We discussed the workup for diagnosis and management.The Post operative care was explained to the patient. She was counselled on diet , exercise and wound care.We discussed the pathology and surgery with her.\par We discussed the importance of close follow up. \par \par We counselled on Alchol use\par We counselled on pregnancy\par We counselled on weight gain. \par \par \par We discussed the importance of close follow up. \par We informed that she needs to follow up in 1 year.\par We also informed that she can call us if anything changes or has any questions.\par \par

## 2019-03-06 NOTE — PHYSICAL EXAM
[Normal Thyroid] : the thyroid was normal [Respiratory Effort] : normal respiratory effort [Normal Rate and Rhythm] : normal rate and rhythm [Purpura] : no purpura  [Alert] : alert [Calm] : calm [de-identified] : Normal [de-identified] : Normal [de-identified] : Normal [de-identified] : well healed scars. no hernia [de-identified] : Normal

## 2019-03-06 NOTE — HISTORY OF PRESENT ILLNESS
[de-identified] : 32 yo female patient without significant medical history comes today for followup visit. S/p pancreaticoduodenctomy, pylorus-preserving, complicated with a sepsis - possibly from aspiration pneumonia. She was discharged with one surgical drain. \par She had seen my partner post op on March 5th. At that time she had a well controlled, type A pancreatic fistula and early resolution of delayed gastric emptying. She was draining 30 ml a day of cloudy fluid from her left DIMITRY. She was tolerating regular diet with occasional nausea and vomiting. She was passing gas and having normal BMs. She was here today with her family for followup visit. \par She was admitted with some pain. She underwent a CT scan , blood work and culture. She possibly had a combination of post op UTI or yana pancreatic fluid. She was later discharged home on Levofloxacin and then Bactrim was added. \par 3/21/18: he is doing very well. She has a good appetite and is eating more. She ambulating and wants to go back to work. \par Her incisions have healed very well. Minimal scabs are there. \par The right DIMITRY drain is dry. The left DIMITRY drain still has some cloudy fluids the last 5 readings have been 10 , 20 , 15, 10 , 10 ml. So it is decreasing. \par 3/30/18: She again was doing very well. She had a good appetite and is eating more. She ambulating and wanted to go back to work. \par Her incisions had healed very well. The right DIMITRY drain is dry. The left DIMITRY drain still has some cloudy fluids the last 6 readings have been 10,9,9,8,7,5 So it is still decreasing. \par 4/4/18:She was doing very well. She has a good appetite and is eating more. She ambulating and wants to go back to work. \par Her incisions have healed very well. The right DIMITRY drain site was dry. The left DIMITRY drain still has some cloudy fluids the last 5 readings have been 5 ml each. So it is still decreasing. \par She is eating well and having regular bowel movements that are brown and sink. She was asked to follow up in 2 weeks for drain removal. \par She has seen Dr. Murray and is going to get a CT scan in 3 months. \par 5/4 Her drain output has only been 10 ml. She has been asymptomatic. We thus removed the drain. \par May : She is doing very well. She is very hungry and eating a lot . She is gaining weight. \par 6 month follow up : She is doing very well. She is very hungry and eating a lot . She is gaining weight. \par She has some stretching in the anterior abdominal wall when sneezing and coughing. \par We reviewed her latest labs and CT that were done by Dr. Murray\par  [de-identified] : 1 year follow up : She is doing well, She has regained normal life\par She is at her pre op weight. \par She has craving for sweet stuff

## 2019-03-07 ENCOUNTER — FORM ENCOUNTER (OUTPATIENT)
Age: 34
End: 2019-03-07

## 2019-03-07 DIAGNOSIS — C25.0 MALIGNANT NEOPLASM OF HEAD OF PANCREAS: ICD-10-CM

## 2019-03-08 ENCOUNTER — LABORATORY RESULT (OUTPATIENT)
Age: 34
End: 2019-03-08

## 2019-03-08 ENCOUNTER — OUTPATIENT (OUTPATIENT)
Dept: OUTPATIENT SERVICES | Facility: HOSPITAL | Age: 34
LOS: 1 days | Discharge: HOME | End: 2019-03-08

## 2019-03-08 ENCOUNTER — APPOINTMENT (OUTPATIENT)
Dept: OBGYN | Facility: CLINIC | Age: 34
End: 2019-03-08

## 2019-03-08 VITALS
WEIGHT: 165 LBS | BODY MASS INDEX: 28.17 KG/M2 | SYSTOLIC BLOOD PRESSURE: 110 MMHG | HEIGHT: 64 IN | DIASTOLIC BLOOD PRESSURE: 80 MMHG

## 2019-03-08 DIAGNOSIS — Z85.07 PERSONAL HISTORY OF MALIGNANT NEOPLASM OF PANCREAS: Chronic | ICD-10-CM

## 2019-03-08 DIAGNOSIS — Z87.440 PERSONAL HISTORY OF URINARY (TRACT) INFECTIONS: ICD-10-CM

## 2019-03-08 DIAGNOSIS — N34.2 OTHER URETHRITIS: ICD-10-CM

## 2019-03-08 DIAGNOSIS — C80.1 MALIGNANT (PRIMARY) NEOPLASM, UNSPECIFIED: ICD-10-CM

## 2019-03-08 DIAGNOSIS — N89.8 OTHER SPECIFIED NONINFLAMMATORY DISORDERS OF VAGINA: ICD-10-CM

## 2019-03-08 NOTE — PHYSICAL EXAM
[Normal] : cervix [Mucoid] : mucoid [Discharge] : had a ~M discharge [Moderate] : moderate [Colleen] : yellow [Thick] : thick [de-identified] : open .75 laceration cultured [FreeTextEntry4] : open .75cm laceration cultured, vaginal discharge thick yellow cultured

## 2019-03-08 NOTE — HISTORY OF PRESENT ILLNESS
[6 Months Ago] : 6 months ago [Good] : being in good health [Itching] : itching [Stinging] : stinging [Lesion] : lesion [Rt Vulva] : right vulva [FreeTextEntry9] : pain [Sexually Active] : is sexually active [Monogamous] : is monogamous [Male ___] : [unfilled] male

## 2019-03-14 DIAGNOSIS — L29.2 PRURITUS VULVAE: ICD-10-CM

## 2019-03-14 DIAGNOSIS — N76.0 ACUTE VAGINITIS: ICD-10-CM

## 2019-03-22 LAB
APPEARANCE: ABNORMAL
BACTERIA UR CULT: NORMAL
BILIRUBIN URINE: NEGATIVE
BLOOD URINE: ABNORMAL
COLOR: YELLOW
GLUCOSE QUALITATIVE U: NEGATIVE
KETONES URINE: NEGATIVE
LEUKOCYTE ESTERASE URINE: ABNORMAL
NITRITE URINE: NEGATIVE
PH URINE: 6
PROTEIN URINE: ABNORMAL
SPECIFIC GRAVITY URINE: >=1.03
UROBILINOGEN URINE: 0.2 (ref 0.2–?)

## 2019-03-27 LAB
HSV+VZV DNA SPEC QL NAA+PROBE: NOT DETECTED
SPECIMEN SOURCE: NORMAL

## 2019-04-01 ENCOUNTER — OUTPATIENT (OUTPATIENT)
Dept: OUTPATIENT SERVICES | Facility: HOSPITAL | Age: 34
LOS: 1 days | End: 2019-04-01
Payer: MEDICAID

## 2019-04-01 DIAGNOSIS — Z85.07 PERSONAL HISTORY OF MALIGNANT NEOPLASM OF PANCREAS: Chronic | ICD-10-CM

## 2019-04-01 PROCEDURE — G9001: CPT

## 2019-04-10 ENCOUNTER — LABORATORY RESULT (OUTPATIENT)
Age: 34
End: 2019-04-10

## 2019-04-10 ENCOUNTER — OUTPATIENT (OUTPATIENT)
Dept: OUTPATIENT SERVICES | Facility: HOSPITAL | Age: 34
LOS: 1 days | Discharge: HOME | End: 2019-04-10

## 2019-04-10 DIAGNOSIS — Z85.07 PERSONAL HISTORY OF MALIGNANT NEOPLASM OF PANCREAS: Chronic | ICD-10-CM

## 2019-04-10 DIAGNOSIS — Z00.00 ENCOUNTER FOR GENERAL ADULT MEDICAL EXAMINATION WITHOUT ABNORMAL FINDINGS: ICD-10-CM

## 2019-04-12 ENCOUNTER — OUTPATIENT (OUTPATIENT)
Dept: OUTPATIENT SERVICES | Facility: HOSPITAL | Age: 34
LOS: 1 days | Discharge: HOME | End: 2019-04-12

## 2019-04-12 ENCOUNTER — APPOINTMENT (OUTPATIENT)
Dept: INTERNAL MEDICINE | Facility: CLINIC | Age: 34
End: 2019-04-12

## 2019-04-12 VITALS
HEIGHT: 64 IN | TEMPERATURE: 99 F | SYSTOLIC BLOOD PRESSURE: 102 MMHG | DIASTOLIC BLOOD PRESSURE: 69 MMHG | HEART RATE: 67 BPM | BODY MASS INDEX: 27.83 KG/M2 | WEIGHT: 163 LBS

## 2019-04-12 DIAGNOSIS — Z85.07 PERSONAL HISTORY OF MALIGNANT NEOPLASM OF PANCREAS: Chronic | ICD-10-CM

## 2019-04-12 DIAGNOSIS — N76.0 ACUTE VAGINITIS: ICD-10-CM

## 2019-04-12 DIAGNOSIS — C25.9 MALIGNANT NEOPLASM OF PANCREAS, UNSPECIFIED: ICD-10-CM

## 2019-04-12 DIAGNOSIS — R87.619 UNSPECIFIED ABNORMAL CYTOLOGICAL FINDINGS IN SPECIMENS FROM CERVIX UTERI: ICD-10-CM

## 2019-04-12 LAB
ALBUMIN SERPL ELPH-MCNC: 4.3 G/DL
ALP BLD-CCNC: 119 U/L
ALT SERPL-CCNC: 43 U/L
ANION GAP SERPL CALC-SCNC: 13 MMOL/L
AST SERPL-CCNC: 34 U/L
BASOPHILS # BLD AUTO: 0.01 K/UL
BASOPHILS NFR BLD AUTO: 0.2 %
BILIRUB SERPL-MCNC: 0.3 MG/DL
BUN SERPL-MCNC: 19 MG/DL
CALCIUM SERPL-MCNC: 8.6 MG/DL
CHLORIDE SERPL-SCNC: 103 MMOL/L
CHOLEST SERPL-MCNC: 124 MG/DL
CHOLEST/HDLC SERPL: 2.7 RATIO
CO2 SERPL-SCNC: 22 MMOL/L
CREAT SERPL-MCNC: 0.5 MG/DL
EOSINOPHIL # BLD AUTO: 0.15 K/UL
EOSINOPHIL NFR BLD AUTO: 2.3 %
GLUCOSE SERPL-MCNC: 93 MG/DL
HCT VFR BLD CALC: 38.1 %
HDLC SERPL-MCNC: 46 MG/DL
HGB BLD-MCNC: 12.4 G/DL
IMM GRANULOCYTES NFR BLD AUTO: 0.3 %
LDLC SERPL CALC-MCNC: 76 MG/DL
LYMPHOCYTES # BLD AUTO: 2.27 K/UL
LYMPHOCYTES NFR BLD AUTO: 34.4 %
MAN DIFF?: NORMAL
MCHC RBC-ENTMCNC: 28.4 PG
MCHC RBC-ENTMCNC: 32.5 G/DL
MCV RBC AUTO: 87.4 FL
MONOCYTES # BLD AUTO: 0.58 K/UL
MONOCYTES NFR BLD AUTO: 8.8 %
NEUTROPHILS # BLD AUTO: 3.56 K/UL
NEUTROPHILS NFR BLD AUTO: 54 %
PLATELET # BLD AUTO: 295 K/UL
POTASSIUM SERPL-SCNC: 4.3 MMOL/L
PROT SERPL-MCNC: 7.4 G/DL
RBC # BLD: 4.36 M/UL
RBC # FLD: 13.6 %
SODIUM SERPL-SCNC: 138 MMOL/L
TRIGL SERPL-MCNC: 69 MG/DL
TSH SERPL-ACNC: 1.94 UIU/ML
WBC # FLD AUTO: 6.59 K/UL

## 2019-04-12 NOTE — REVIEW OF SYSTEMS
[Vaginal Discharge] : vaginal discharge [Negative] : Heme/Lymph [Dysuria] : no dysuria [Incontinence] : no incontinence [Hematuria] : no hematuria [Nocturia] : no nocturia [Frequency] : no frequency

## 2019-04-12 NOTE — HISTORY OF PRESENT ILLNESS
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 currently she has no complaints, only taking iron pill. She has good appetite and has consistent weight gain. Patient also has been complaining thick vaginal discharge, but no dysuria or blood in urine. Denies any weight loss, fevers, night sweats or pain, chest pain, shortness of breath, nausea/vomiting, diarrhea/constipation. \par

## 2019-04-12 NOTE — PHYSICAL EXAM
[Well Developed] : well developed [No Acute Distress] : no acute distress [Well Nourished] : well nourished [Normal Sclera/Conjunctiva] : normal sclera/conjunctiva [Well-Appearing] : well-appearing [Normal Outer Ear/Nose] : the outer ears and nose were normal in appearance [Normal Oropharynx] : the oropharynx was normal [No Lymphadenopathy] : no lymphadenopathy [Supple] : supple [No JVD] : no jugular venous distention [Thyroid Normal, No Nodules] : the thyroid was normal and there were no nodules present [Clear to Auscultation] : lungs were clear to auscultation bilaterally [No Respiratory Distress] : no respiratory distress  [No Accessory Muscle Use] : no accessory muscle use [Normal Rate] : normal rate  [Normal S1, S2] : normal S1 and S2 [Regular Rhythm] : with a regular rhythm [No Murmur] : no murmur heard [No Edema] : there was no peripheral edema [Soft] : abdomen soft [Non-distended] : non-distended [No HSM] : no HSM [Normal Bowel Sounds] : normal bowel sounds [Normal Posterior Cervical Nodes] : no posterior cervical lymphadenopathy [No Spinal Tenderness] : no spinal tenderness [Normal Anterior Cervical Nodes] : no anterior cervical lymphadenopathy [No CVA Tenderness] : no CVA  tenderness [No Rash] : no rash [Grossly Normal Strength/Tone] : grossly normal strength/tone [No Joint Swelling] : no joint swelling [No Focal Deficits] : no focal deficits [Coordination Grossly Intact] : coordination grossly intact [Normal Gait] : normal gait [Normal Affect] : the affect was normal [Normal Insight/Judgement] : insight and judgment were intact [de-identified] : Suprapubic tenderness

## 2019-04-12 NOTE — ASSESSMENT
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 s/p pancreaticoduodenctomy\par Ca 19-9: 9\par CT abdomen shows post whipple procedure, with no evidence of suspicious soft tissue lesion or fluid collection in the postsurgical bed\par Mildly elevated ALT and alphos, trending down\par patient appears to be doing well.\par \par #Transaminitis\par CT abdomen negative for mets/soft tissue lesions\par Patient eats a lot of fried food\par Education on diet modification, will follow up LFTs in 2 months\par Patient is advised to have low fat diet\par \par #Vaginal discharge\par UA negative, vaginitis panel not cancelled\par given symptoms will Try empiric Rx with Clindamycin vaginal cream\par \par #HCM\par Repeated Pap with HPV negative 10/1/2018\par \par RTC in 2 months\par \par

## 2019-04-17 DIAGNOSIS — Z71.89 OTHER SPECIFIED COUNSELING: ICD-10-CM

## 2019-04-29 ENCOUNTER — APPOINTMENT (OUTPATIENT)
Dept: OBGYN | Facility: CLINIC | Age: 34
End: 2019-04-29

## 2019-04-29 ENCOUNTER — OUTPATIENT (OUTPATIENT)
Dept: OUTPATIENT SERVICES | Facility: HOSPITAL | Age: 34
LOS: 1 days | Discharge: HOME | End: 2019-04-29

## 2019-04-29 ENCOUNTER — RESULT CHARGE (OUTPATIENT)
Age: 34
End: 2019-04-29

## 2019-04-29 VITALS — DIASTOLIC BLOOD PRESSURE: 60 MMHG | BODY MASS INDEX: 28.32 KG/M2 | WEIGHT: 165 LBS | SYSTOLIC BLOOD PRESSURE: 100 MMHG

## 2019-04-29 DIAGNOSIS — Z85.07 PERSONAL HISTORY OF MALIGNANT NEOPLASM OF PANCREAS: Chronic | ICD-10-CM

## 2019-04-29 DIAGNOSIS — L29.2 PRURITUS VULVAE: ICD-10-CM

## 2019-04-29 DIAGNOSIS — B96.89 ACUTE VAGINITIS: ICD-10-CM

## 2019-04-29 DIAGNOSIS — N76.0 ACUTE VAGINITIS: ICD-10-CM

## 2019-04-29 NOTE — PHYSICAL EXAM
[Vulvitis] : vulvitis [Normal] : uterus [Labia Majora] : labia major [Labia Minora] : labia minora [Discharge] : a  ~M vaginal discharge was present [Scant] : scant [No Bleeding] : there was no active vaginal bleeding

## 2019-05-05 ENCOUNTER — FORM ENCOUNTER (OUTPATIENT)
Age: 34
End: 2019-05-05

## 2019-05-06 ENCOUNTER — OUTPATIENT (OUTPATIENT)
Dept: OUTPATIENT SERVICES | Facility: HOSPITAL | Age: 34
LOS: 1 days | Discharge: HOME | End: 2019-05-06
Payer: MEDICAID

## 2019-05-06 DIAGNOSIS — N64.4 MASTODYNIA: ICD-10-CM

## 2019-05-06 DIAGNOSIS — Z85.07 PERSONAL HISTORY OF MALIGNANT NEOPLASM OF PANCREAS: Chronic | ICD-10-CM

## 2019-05-06 LAB
HCG UR QL: NEGATIVE
QUALITY CONTROL: YES

## 2019-05-06 PROCEDURE — 76642 ULTRASOUND BREAST LIMITED: CPT | Mod: 26,LT

## 2019-05-06 PROCEDURE — G0279: CPT | Mod: 26

## 2019-05-06 PROCEDURE — 77066 DX MAMMO INCL CAD BI: CPT | Mod: 26

## 2019-06-03 ENCOUNTER — APPOINTMENT (OUTPATIENT)
Dept: HEMATOLOGY ONCOLOGY | Facility: CLINIC | Age: 34
End: 2019-06-03
Payer: MEDICAID

## 2019-06-03 VITALS
SYSTOLIC BLOOD PRESSURE: 118 MMHG | TEMPERATURE: 98.6 F | HEART RATE: 69 BPM | WEIGHT: 165 LBS | BODY MASS INDEX: 28.17 KG/M2 | DIASTOLIC BLOOD PRESSURE: 85 MMHG | RESPIRATION RATE: 18 BRPM | HEIGHT: 64 IN

## 2019-06-03 PROCEDURE — 99213 OFFICE O/P EST LOW 20 MIN: CPT

## 2019-06-03 NOTE — PHYSICAL EXAM
[Normal] : no peripheral adenopathy appreciated [de-identified] : healed scar of surgery , no tenderness or redness. no palpable masses.

## 2019-06-03 NOTE — ASSESSMENT
[FreeTextEntry1] : 32yo F with solid pseudo-papillary neoplasm of head of  pancreas pT2 pN0 s/p R0 resection  and mild anemia responding to iron replacement. Repeat CT A/P demonstrates TARYN \par \par PLAN:\par Solid pseudo-papillary neoplasm of head of  pancreas:\par - pT2 pN0 s/p R0 resection\par - repeat CT A/P 8/18: TARYN, CA-19-9 13.3\par \par Iron Deficiency Anemia:\par - On oral iron once daily \par - Hg 12, ferritin 12 \par - continue oral iron\par \par Surgical Site Pain:\par - Follow up with Surgical Oncology \par \par Shortness of breath- intermittent:\par - Most likely anxiety and anemia related\par -Advised patient that if SOB worsened or became more frequent to come back for follow up

## 2019-06-03 NOTE — HISTORY OF PRESENT ILLNESS
[de-identified] : 06/14/2018 patient returns for follow up , she feels well , no abdominal pain or weight loss. no nausea or vomiting . Blood work was notable for mild normocytic anemia , Ferritin 38 . She denies hematochezia or heavy menses. \par \par 08/21/2018 : Patient returns for follow up , she is on iron one daily , Hb improved to 12. She complains of slight increase in epigastric discomfort , no nausea, vomiting , pruritis, change in bowel habits. no weight loss. \par \par 06/03/2019 Patient returns for history of pancreatic cancer s/p Whipple's with no evidence of disease. Iron deficiency anemia s/p venofer .She feels well and denies any GI symptoms . no weight loss, nausea vomiting . Recent blood work shows slightly elevated alkaline phosphatase . [de-identified] : \par 32 yo female , Uzbek speaking, born in [place] , interviewed with  # [id number]. Patient with  with no significant  significant medical history . S/p pancreaticoduodenectomy, pylorus-preserving R0 resection of  for pancreatic mass diagnosed when she presented with epigastric and periumbilical swelling for 3 months.  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  surgery complicated with a sepsis - possibly from aspiration pneumonia. \par type A pancreatic fistula and early resolution of delayed gastric emptying. She continues to have drainage 10 to 20cc daily . She denies fever, nausea, vomiting , abdominal pain or diarrhea. \par \par \par \par

## 2019-06-03 NOTE — HISTORY OF PRESENT ILLNESS
[de-identified] : 06/14/2018 patient returns for follow up , she feels well , no abdominal pain or weight loss. no nausea or vomiting . Blood work was notable for mild normocytic anemia , Ferritin 38 . She denies hematochezia or heavy menses. \par \par 08/21/2018 : Patient returns for follow up , she is on iron one daily , Hb improved to 12. She complains of slight increase in epigastric discomfort , no nausea, vomiting , pruritis, change in bowel habits. no weight loss. \par \par 06/03/2019 Patient returns for history of pancreatic cancer s/p Whipple's with no evidence of disease. Iron deficiency anemia s/p venofer .She feels well and denies any GI symptoms . no weight loss, nausea vomiting . Recent blood work shows slightly elevated alkaline phosphatase . [de-identified] : \par 32 yo female , Yakut speaking, born in [place] , interviewed with  # [id number]. Patient with  with no significant  significant medical history . S/p pancreaticoduodenectomy, pylorus-preserving R0 resection of  for pancreatic mass diagnosed when she presented with epigastric and periumbilical swelling for 3 months.  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  surgery complicated with a sepsis - possibly from aspiration pneumonia. \par type A pancreatic fistula and early resolution of delayed gastric emptying. She continues to have drainage 10 to 20cc daily . She denies fever, nausea, vomiting , abdominal pain or diarrhea. \par \par \par \par

## 2019-06-03 NOTE — ASSESSMENT
[FreeTextEntry1] : 34yo F with solid pseudo-papillary neoplasm of head of  pancreas pT2 pN0 s/p R0 resection  and mild anemia responding to iron replacement. Repeat CT A/P demonstrates TARYN \par \par PLAN:\par Solid pseudo-papillary neoplasm of head of  pancreas:\par - pT2 pN0 s/p R0 resection\par - repeat CT A/P 8/18: TARYN, CA-19-9 13.3\par \par Iron Deficiency Anemia:\par - On oral iron once daily \par - Hg 12, ferritin 12 \par - continue oral iron\par \par Surgical Site Pain:\par - Follow up with Surgical Oncology \par \par Shortness of breath- intermittent:\par - Most likely anxiety and anemia related\par -Advised patient that if SOB worsened or became more frequent to come back for follow up

## 2019-07-12 ENCOUNTER — APPOINTMENT (OUTPATIENT)
Dept: INTERNAL MEDICINE | Facility: CLINIC | Age: 34
End: 2019-07-12

## 2019-07-12 ENCOUNTER — OUTPATIENT (OUTPATIENT)
Dept: OUTPATIENT SERVICES | Facility: HOSPITAL | Age: 34
LOS: 1 days | Discharge: HOME | End: 2019-07-12

## 2019-07-12 VITALS
BODY MASS INDEX: 28.34 KG/M2 | SYSTOLIC BLOOD PRESSURE: 116 MMHG | HEIGHT: 64 IN | WEIGHT: 166 LBS | DIASTOLIC BLOOD PRESSURE: 76 MMHG | HEART RATE: 74 BPM

## 2019-07-12 DIAGNOSIS — R74.0 NONSPECIFIC ELEVATION OF LEVELS OF TRANSAMINASE AND LACTIC ACID DEHYDROGENASE [LDH]: ICD-10-CM

## 2019-07-12 DIAGNOSIS — L70.9 ACNE, UNSPECIFIED: ICD-10-CM

## 2019-07-12 DIAGNOSIS — Z85.07 PERSONAL HISTORY OF MALIGNANT NEOPLASM OF PANCREAS: Chronic | ICD-10-CM

## 2019-07-12 NOTE — PHYSICAL EXAM
[No Acute Distress] : no acute distress [Well Nourished] : well nourished [Well Developed] : well developed [Well-Appearing] : well-appearing [PERRL] : pupils equal round and reactive to light [Normal Sclera/Conjunctiva] : normal sclera/conjunctiva [EOMI] : extraocular movements intact [No JVD] : no jugular venous distention [Normal Outer Ear/Nose] : the outer ears and nose were normal in appearance [Normal Oropharynx] : the oropharynx was normal [No Lymphadenopathy] : no lymphadenopathy [Supple] : supple [No Respiratory Distress] : no respiratory distress  [Thyroid Normal, No Nodules] : the thyroid was normal and there were no nodules present [Clear to Auscultation] : lungs were clear to auscultation bilaterally [No Accessory Muscle Use] : no accessory muscle use [Regular Rhythm] : with a regular rhythm [Normal Rate] : normal rate  [Normal S1, S2] : normal S1 and S2 [No Murmur] : no murmur heard [No Carotid Bruits] : no carotid bruits [Pedal Pulses Present] : the pedal pulses are present [No Abdominal Bruit] : a ~M bruit was not heard ~T in the abdomen [No Varicosities] : no varicosities [No Palpable Aorta] : no palpable aorta [No Extremity Clubbing/Cyanosis] : no extremity clubbing/cyanosis [No Edema] : there was no peripheral edema [Soft] : abdomen soft [Non Tender] : non-tender [Non-distended] : non-distended [No HSM] : no HSM [No Masses] : no abdominal mass palpated [Normal Posterior Cervical Nodes] : no posterior cervical lymphadenopathy [Normal Bowel Sounds] : normal bowel sounds [Normal Anterior Cervical Nodes] : no anterior cervical lymphadenopathy [No Spinal Tenderness] : no spinal tenderness [No CVA Tenderness] : no CVA  tenderness [Grossly Normal Strength/Tone] : grossly normal strength/tone [No Joint Swelling] : no joint swelling [Coordination Grossly Intact] : coordination grossly intact [No Focal Deficits] : no focal deficits [No Rash] : no rash [Normal Gait] : normal gait [Deep Tendon Reflexes (DTR)] : deep tendon reflexes were 2+ and symmetric [Normal Affect] : the affect was normal [Normal Insight/Judgement] : insight and judgment were intact

## 2019-07-15 DIAGNOSIS — R74.0 NONSPECIFIC ELEVATION OF LEVELS OF TRANSAMINASE AND LACTIC ACID DEHYDROGENASE [LDH]: ICD-10-CM

## 2019-07-15 DIAGNOSIS — Z00.00 ENCOUNTER FOR GENERAL ADULT MEDICAL EXAMINATION WITHOUT ABNORMAL FINDINGS: ICD-10-CM

## 2019-07-15 DIAGNOSIS — L70.9 ACNE, UNSPECIFIED: ICD-10-CM

## 2019-07-15 DIAGNOSIS — C80.1 MALIGNANT (PRIMARY) NEOPLASM, UNSPECIFIED: ICD-10-CM

## 2019-07-16 LAB
ALBUMIN SERPL ELPH-MCNC: 4.4 G/DL
ALP BLD-CCNC: 122 U/L
ALT SERPL-CCNC: 20 U/L
AST SERPL-CCNC: 18 U/L
BILIRUB DIRECT SERPL-MCNC: <0.2 MG/DL
BILIRUB INDIRECT SERPL-MCNC: NORMAL MG/DL
BILIRUB SERPL-MCNC: 0.4 MG/DL
PROT SERPL-MCNC: 7.7 G/DL

## 2019-08-09 ENCOUNTER — APPOINTMENT (OUTPATIENT)
Dept: SURGERY | Facility: CLINIC | Age: 34
End: 2019-08-09
Payer: COMMERCIAL

## 2019-08-09 VITALS
HEIGHT: 64 IN | DIASTOLIC BLOOD PRESSURE: 74 MMHG | SYSTOLIC BLOOD PRESSURE: 120 MMHG | WEIGHT: 168 LBS | BODY MASS INDEX: 28.68 KG/M2

## 2019-08-09 PROCEDURE — 99024 POSTOP FOLLOW-UP VISIT: CPT

## 2019-08-09 NOTE — PHYSICAL EXAM
[Normal Thyroid] : the thyroid was normal [Respiratory Effort] : normal respiratory effort [Normal Rate and Rhythm] : normal rate and rhythm [Alert] : alert [Calm] : calm [Purpura] : no purpura  [de-identified] : Normal [de-identified] : Normal [de-identified] : Normal [de-identified] : Normal [de-identified] : well healed scars. no hernia

## 2019-08-09 NOTE — ASSESSMENT
[FreeTextEntry1] : 34 yo female patient without significant medical history comes today for followup visit. S/p pancreaticoduodenctomy, pylorus-preserving, complicated with a sepsis - possibly from aspiration pneumonia. She was discharged with one surgical drain. \par She had seen my partner post op on March 5th. At that time she had a well controlled, type A pancreatic fistula and early resolution of delayed gastric emptying. She was draining 30 ml a day of cloudy fluid from her left DIMITRY. She was tolerating regular diet with occasional nausea and vomiting. She was passing gas and having normal BMs. She was here today with her family for followup visit. \par She was admitted with some pain. She underwent a CT scan , blood work and culture. She possibly had a combination of post op UTI or yana pancreatic fluid. She was later discharged home on Levofloxacin and then Bactrim was added. \par 3/21/18: he is doing very well. She has a good appetite and is eating more. She ambulating and wants to go back to work. \par Her incisions have healed very well. Minimal scabs are there. \par The right DIMITRY drain is dry. The left DIMITRY drain still has some cloudy fluids the last 5 readings have been 10 , 20 , 15, 10 , 10 ml. So it is decreasing. \par 3/30/18: She again was doing very well. She had a good appetite and is eating more. She ambulating and wanted to go back to work. \par Her incisions had healed very well. The right DIMITRY drain is dry. The left DIMITRY drain still has some cloudy fluids the last 6 readings have been 10,9,9,8,7,5 So it is still decreasing. \par 4/4/18:She was doing very well. She has a good appetite and is eating more. She ambulating and wants to go back to work. \par Her incisions have healed very well. The right DIMITRY drain site was dry. The left DIMITRY drain still has some cloudy fluids the last 5 readings have been 5 ml each. So it is still decreasing. \par She is eating well and having regular bowel movements that are brown and sink. She was asked to follow up in 2 weeks for drain removal. \par She has seen Dr. Murray and is going to get a CT scan in 3 months. \par 5/4 Her drain output has only been 10 ml. She has been asymptomatic. We thus removed the drain. \par May : She is doing very well. She is very hungry and eating a lot . She is gaining weight. \par 6 month follow up : She is doing very well. She is very hungry and eating a lot . She is gaining weight. \par She has some stretching in the anterior abdominal wall when sneezing and coughing. \par We reviewed her latest labs and CT that were done by Dr. Murray\par 1 year follow up : She is doing well, She has regained normal life. She is at her pre op weight.  She has craving for sweet stuff\par \par 1 1/2  year follow up : She was doing well, She has regained normal life. She is at her pre op weight.  \par She had been to [facility] where she ate a lot of fried food. \par Since coming back she has epigastric discomfort. Which is worse on breathing deep. \par She denies nausea / vomiting. \par Her stools are brown , well formed. She denies any blood in the stool \par \par \par We explained in great detail the pathophysiology of the disease process. We discussed the workup for diagnosis and management.The Post operative care was explained to the patient. She was counselled on diet , exercise and wound care.\par I discussed that the discomfort could be from various reasons. As she is having well formed brown stools that do no float or smell - I do think she is any of the anastomosis is causing any problems. Her LFTs were also improving before. \par I started her on PPIs and asked her to follow up in 2 weeks. If she does not improve I will get her a set of Labs , CT and possibly and EGD. \par \par We counselled on Alchol use\par \par \par We discussed the importance of close follow up. \par We informed that she needs to follow up in 2 weeks\par We also informed that she can call us if anything changes or has any questions.\par \par

## 2019-08-10 LAB
ALBUMIN SERPL ELPH-MCNC: 4.5 G/DL
ALP BLD-CCNC: 116 U/L
ALT SERPL-CCNC: 29 U/L
AST SERPL-CCNC: 23 U/L
BASOPHILS # BLD AUTO: 0.03 K/UL
BASOPHILS NFR BLD AUTO: 0.5 %
BILIRUB DIRECT SERPL-MCNC: <0.2 MG/DL
BILIRUB INDIRECT SERPL-MCNC: >0.6 MG/DL
BILIRUB SERPL-MCNC: 0.8 MG/DL
CHOLEST SERPL-MCNC: 153 MG/DL
CHOLEST/HDLC SERPL: 2.6 RATIO
EOSINOPHIL # BLD AUTO: 0.16 K/UL
EOSINOPHIL NFR BLD AUTO: 2.5 %
ESTIMATED AVERAGE GLUCOSE: 117 MG/DL
HBA1C MFR BLD HPLC: 5.7 %
HBV SURFACE AB SERPL IA-ACNC: <3 MIU/ML
HCT VFR BLD CALC: 36.2 %
HCV AB SER QL: NONREACTIVE
HCV S/CO RATIO: 0.09 S/CO
HDLC SERPL-MCNC: 58 MG/DL
HGB BLD-MCNC: 11.9 G/DL
HIV1+2 AB SPEC QL IA.RAPID: NONREACTIVE
IMM GRANULOCYTES NFR BLD AUTO: 0.3 %
LDLC SERPL CALC-MCNC: 99 MG/DL
LYMPHOCYTES # BLD AUTO: 1.9 K/UL
LYMPHOCYTES NFR BLD AUTO: 30 %
MAN DIFF?: NORMAL
MCHC RBC-ENTMCNC: 28.1 PG
MCHC RBC-ENTMCNC: 32.9 G/DL
MCV RBC AUTO: 85.4 FL
MONOCYTES # BLD AUTO: 0.59 K/UL
MONOCYTES NFR BLD AUTO: 9.3 %
NEUTROPHILS # BLD AUTO: 3.64 K/UL
NEUTROPHILS NFR BLD AUTO: 57.4 %
PLATELET # BLD AUTO: 316 K/UL
PROT SERPL-MCNC: 7.7 G/DL
RBC # BLD: 4.24 M/UL
RBC # FLD: 13.1 %
TRIGL SERPL-MCNC: 56 MG/DL
TSH SERPL-ACNC: 1.18 UIU/ML
WBC # FLD AUTO: 6.34 K/UL

## 2019-08-21 ENCOUNTER — LABORATORY RESULT (OUTPATIENT)
Age: 34
End: 2019-08-21

## 2019-08-21 ENCOUNTER — APPOINTMENT (OUTPATIENT)
Dept: SURGERY | Facility: CLINIC | Age: 34
End: 2019-08-21
Payer: COMMERCIAL

## 2019-08-21 ENCOUNTER — OUTPATIENT (OUTPATIENT)
Dept: OUTPATIENT SERVICES | Facility: HOSPITAL | Age: 34
LOS: 1 days | Discharge: HOME | End: 2019-08-21

## 2019-08-21 VITALS
BODY MASS INDEX: 28.51 KG/M2 | DIASTOLIC BLOOD PRESSURE: 72 MMHG | WEIGHT: 167 LBS | SYSTOLIC BLOOD PRESSURE: 124 MMHG | HEIGHT: 64 IN

## 2019-08-21 DIAGNOSIS — R10.9 UNSPECIFIED ABDOMINAL PAIN: ICD-10-CM

## 2019-08-21 DIAGNOSIS — Z85.07 PERSONAL HISTORY OF MALIGNANT NEOPLASM OF PANCREAS: Chronic | ICD-10-CM

## 2019-08-21 DIAGNOSIS — R30.0 DYSURIA: ICD-10-CM

## 2019-08-21 PROCEDURE — 99214 OFFICE O/P EST MOD 30 MIN: CPT

## 2019-08-26 ENCOUNTER — OUTPATIENT (OUTPATIENT)
Dept: OUTPATIENT SERVICES | Facility: HOSPITAL | Age: 34
LOS: 1 days | Discharge: HOME | End: 2019-08-26

## 2019-08-26 ENCOUNTER — APPOINTMENT (OUTPATIENT)
Dept: HEMATOLOGY ONCOLOGY | Facility: CLINIC | Age: 34
End: 2019-08-26
Payer: COMMERCIAL

## 2019-08-26 VITALS
TEMPERATURE: 96.3 F | WEIGHT: 166 LBS | DIASTOLIC BLOOD PRESSURE: 85 MMHG | SYSTOLIC BLOOD PRESSURE: 120 MMHG | HEART RATE: 61 BPM | BODY MASS INDEX: 28.34 KG/M2 | RESPIRATION RATE: 18 BRPM | HEIGHT: 64 IN

## 2019-08-26 DIAGNOSIS — Z85.07 PERSONAL HISTORY OF MALIGNANT NEOPLASM OF PANCREAS: Chronic | ICD-10-CM

## 2019-08-26 DIAGNOSIS — K86.9 DISEASE OF PANCREAS, UNSPECIFIED: ICD-10-CM

## 2019-08-26 PROCEDURE — 99213 OFFICE O/P EST LOW 20 MIN: CPT

## 2019-08-27 LAB
FERRITIN SERPL-MCNC: 12 NG/ML
IRON SATN MFR SERPL: 8 %
IRON SERPL-MCNC: 32 UG/DL
TIBC SERPL-MCNC: 398 UG/DL
UIBC SERPL-MCNC: 366 UG/DL

## 2019-08-27 NOTE — ASSESSMENT
[FreeTextEntry1] : #Solid pseudo-papillary neoplasm of head of  pancreas pT2 pN0 s/p R0 resection.\par --Repeat CT abdomen scheduled for this week by her surgeon Dr Dunn\par \par #Epigastric pain: Probably gastritis from eating excessive fried foods recently\par --Improved with omeprazole\par --Has H/O H. pylori on EGD from 2/2018. Pt unsure if she was on t/t. stool antigen from 8/21/19 was negative\par --F/U with Gi Dr Dominguez\par \par #Iron deficiency anemia: Repeat ferritin\par --C/W PO iron for now. She takes it every other day\par --Has regular  menses and is considering having more children . \par \par #Slightly elevated LFT ( trending down ) likely secondary to prior Whipple's .\par \par #follow up in 6 months . \par \par

## 2019-08-27 NOTE — PHYSICAL EXAM
[Normal] : no peripheral adenopathy appreciated [de-identified] : healed scar of surgery , no tenderness or redness. no palpable masses. Mild epigastric tenderness

## 2019-08-27 NOTE — HISTORY OF PRESENT ILLNESS
[de-identified] : \par 34 yo female , Telugu speaking, born in [place] , interviewed with  # [id number]. Patient with  with no significant  significant medical history . S/p pancreaticoduodenectomy, pylorus-preserving R0 resection of  for pancreatic mass diagnosed when she presented with epigastric and periumbilical swelling for 3 months.  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  surgery complicated with a sepsis - possibly from aspiration pneumonia. \par type A pancreatic fistula and early resolution of delayed gastric emptying. She continues to have drainage 10 to 20cc daily . She denies fever, nausea, vomiting , abdominal pain or diarrhea. \par \par \par \par  [de-identified] : 06/14/2018 patient returns for follow up , she feels well , no abdominal pain or weight loss. no nausea or vomiting . Blood work was notable for mild normocytic anemia , Ferritin 38 . She denies hematochezia or heavy menses. \par \par 08/21/2018 : Patient returns for follow up , she is on iron one daily , Hb improved to 12. She complains of slight increase in epigastric discomfort , no nausea, vomiting , pruritis, change in bowel habits. no weight loss. \par \par 06/03/2019 Patient returns for history of pancreatic cancer s/p Whipple's with no evidence of disease. Iron deficiency anemia s/p venofer .She feels well and denies any GI symptoms . no weight loss, nausea vomiting . Recent blood work shows slightly elevated alkaline phosphatase.\par \par 8/26/19: History obtained from [place] interpretor Sandra ID #[id number]. Pt c/o epigastric pain a/w mild SOB for the last week, but it improved after starting omeprazole about 3 days back. Her lipase and amylase were negative. Her stool H.pylori antigen was negative. Her latest Hb was 11.9. Ferritin from Dec 2018 was 7. Upon review of her records, she had H.pylori on her EGD from Feb 2018. However, she is unsure if she was on treatment.

## 2019-08-27 NOTE — CONSULT LETTER
[Dear  ___] : Dear  [unfilled], [Consult Letter:] : I had the pleasure of evaluating your patient, [unfilled]. [Please see my note below.] : Please see my note below. [Consult Closing:] : Thank you very much for allowing me to participate in the care of this patient.  If you have any questions, please do not hesitate to contact me. [Sincerely,] : Sincerely, [FreeTextEntry3] : Dr. Murray

## 2019-08-28 ENCOUNTER — FORM ENCOUNTER (OUTPATIENT)
Age: 34
End: 2019-08-28

## 2019-08-28 NOTE — ASSESSMENT
[FreeTextEntry1] : 34 yo female patient without significant medical history comes today for followup visit. S/p pancreaticoduodenctomy, pylorus-preserving, complicated with a sepsis - possibly from aspiration pneumonia. She was discharged with one surgical drain. \par She had seen my partner post op on March 5th. At that time she had a well controlled, type A pancreatic fistula and early resolution of delayed gastric emptying. She was draining 30 ml a day of cloudy fluid from her left DIMITRY. She was tolerating regular diet with occasional nausea and vomiting. She was passing gas and having normal BMs. She was here today with her family for followup visit. \par She was admitted with some pain. She underwent a CT scan , blood work and culture. She possibly had a combination of post op UTI or yana pancreatic fluid. She was later discharged home on Levofloxacin and then Bactrim was added. \par 3/21/18: he is doing very well. She has a good appetite and is eating more. She ambulating and wants to go back to work. \par Her incisions have healed very well. Minimal scabs are there. \par The right DIMITRY drain is dry. The left DIMITRY drain still has some cloudy fluids the last 5 readings have been 10 , 20 , 15, 10 , 10 ml. So it is decreasing. \par 3/30/18: She again was doing very well. She had a good appetite and is eating more. She ambulating and wanted to go back to work. \par Her incisions had healed very well. The right DIMITRY drain is dry. The left DIMITRY drain still has some cloudy fluids the last 6 readings have been 10,9,9,8,7,5 So it is still decreasing. \par 4/4/18:She was doing very well. She has a good appetite and is eating more. She ambulating and wants to go back to work. \par Her incisions have healed very well. The right DIMITRY drain site was dry. The left DIMITRY drain still has some cloudy fluids the last 5 readings have been 5 ml each. So it is still decreasing. \par She is eating well and having regular bowel movements that are brown and sink. She was asked to follow up in 2 weeks for drain removal. \par She has seen Dr. Murray and is going to get a CT scan in 3 months. \par 5/4 Her drain output has only been 10 ml. She has been asymptomatic. We thus removed the drain. \par May : She is doing very well. She is very hungry and eating a lot . She is gaining weight. \par 6 month follow up : She is doing very well. She is very hungry and eating a lot . She is gaining weight. \par She has some stretching in the anterior abdominal wall when sneezing and coughing. \par We reviewed her latest labs and CT that were done by Dr. Murray\par 1 year follow up : She is doing well, She has regained normal life. She is at her pre op weight.  She has craving for sweet stuff\par \par 1 1/2  year follow up : She was doing well, She has regained normal life. She is at her pre op weight.  \par She had been to [facility] where she ate a lot of fried food. \par Since coming back she has epigastric discomfort. Which is worse on breathing deep. \par She denies nausea / vomiting. \par Her stools are brown , well formed. She denies any blood in the stool \par \par \par We explained in great detail the pathophysiology of the disease process. We discussed the workup for diagnosis and management.The Post operative care was explained to the patient. She was counselled on diet , exercise and wound care.\par I discussed that the discomfort could be from various reasons. As she is having well formed brown stools that do no float or smell - I do think she is any of the anastomosis is causing any problems. Her LFTs were also improving before. \par I started her on PPIs and asked her to follow up in 2 weeks. If she does not improve I will get her a set of Labs , CT and possibly and EGD. \par \par With PPI things have not improved. She has dysuria . She has epigastric pain worse on deep breaths. \par LFts and CBC are normal . \par SHe had a needle stick injury at work. - HIV and HEPC tests are negative\par \par Will order amylase lipase and stool elastase.\par Will order a  CT. \par \par \par We discussed the importance of close follow up. \par We informed that she needs to follow up in 2 weeks\par We also informed that she can call us if anything changes or has any questions.\par \par

## 2019-08-28 NOTE — PHYSICAL EXAM
[Normal Thyroid] : the thyroid was normal [Respiratory Effort] : normal respiratory effort [Normal Rate and Rhythm] : normal rate and rhythm [Alert] : alert [Calm] : calm [Purpura] : no purpura  [de-identified] : Normal [de-identified] : Normal [de-identified] : Normal [de-identified] : well healed scars. no hernia [de-identified] : Normal

## 2019-08-28 NOTE — HISTORY OF PRESENT ILLNESS
[de-identified] : 34 yo female patient without significant medical history comes today for followup visit. S/p pancreaticoduodenctomy, pylorus-preserving, complicated with a sepsis - possibly from aspiration pneumonia. She was discharged with one surgical drain. \par She had seen my partner post op on March 5th. At that time she had a well controlled, type A pancreatic fistula and early resolution of delayed gastric emptying. She was draining 30 ml a day of cloudy fluid from her left DIMITRY. She was tolerating regular diet with occasional nausea and vomiting. She was passing gas and having normal BMs. She was here today with her family for followup visit. \par She was admitted with some pain. She underwent a CT scan , blood work and culture. She possibly had a combination of post op UTI or yana pancreatic fluid. She was later discharged home on Levofloxacin and then Bactrim was added. \par 3/21/18: he is doing very well. She has a good appetite and is eating more. She ambulating and wants to go back to work. \par Her incisions have healed very well. Minimal scabs are there. \par The right DIMITRY drain is dry. The left DIMITRY drain still has some cloudy fluids the last 5 readings have been 10 , 20 , 15, 10 , 10 ml. So it is decreasing. \par 3/30/18: She again was doing very well. She had a good appetite and is eating more. She ambulating and wanted to go back to work. \par Her incisions had healed very well. The right DIMITRY drain is dry. The left DIMITRY drain still has some cloudy fluids the last 6 readings have been 10,9,9,8,7,5 So it is still decreasing. \par 4/4/18:She was doing very well. She has a good appetite and is eating more. She ambulating and wants to go back to work. \par Her incisions have healed very well. The right DIMITRY drain site was dry. The left DIMITRY drain still has some cloudy fluids the last 5 readings have been 5 ml each. So it is still decreasing. \par She is eating well and having regular bowel movements that are brown and sink. She was asked to follow up in 2 weeks for drain removal. \par She has seen Dr. Murray and is going to get a CT scan in 3 months. \par 5/4 Her drain output has only been 10 ml. She has been asymptomatic. We thus removed the drain. \par May : She is doing very well. She is very hungry and eating a lot . She is gaining weight. \par 6 month follow up : She is doing very well. She is very hungry and eating a lot . She is gaining weight. \par She has some stretching in the anterior abdominal wall when sneezing and coughing. \par We reviewed her latest labs and CT that were done by Dr. Murray\par 1 year follow up : She is doing well, She has regained normal life. She is at her pre op weight.  She has craving for sweet stuff.\par \par 1 1/2  year follow up : She was doing well, She has regained normal life. She is at her pre op weight.  \par She had been to [facility] where she ate a lot of fried food.  Since coming back she has epigastric discomfort. Which is worse on breathing deep. \par She denies nausea / vomiting.  Her stools are brown , well formed. She denies any blood in the stool  [de-identified] : With PPI things have not improved.\par She has dysuria\par She has epigastric pain worse on deep breaths. \par LFts and CBC are normal \par SHe had a needle stick injury at work. - HIV and HEPC tests are negative\par \par Will order amylape lipase and stool elastase\par CT

## 2019-08-29 ENCOUNTER — OUTPATIENT (OUTPATIENT)
Dept: OUTPATIENT SERVICES | Facility: HOSPITAL | Age: 34
LOS: 1 days | Discharge: HOME | End: 2019-08-29
Payer: MEDICAID

## 2019-08-29 DIAGNOSIS — R10.9 UNSPECIFIED ABDOMINAL PAIN: ICD-10-CM

## 2019-08-29 DIAGNOSIS — Z85.07 PERSONAL HISTORY OF MALIGNANT NEOPLASM OF PANCREAS: Chronic | ICD-10-CM

## 2019-08-29 PROCEDURE — 74170 CT ABD WO CNTRST FLWD CNTRST: CPT | Mod: 26

## 2019-08-30 DIAGNOSIS — D64.9 ANEMIA, UNSPECIFIED: ICD-10-CM

## 2019-08-30 DIAGNOSIS — C80.1 MALIGNANT (PRIMARY) NEOPLASM, UNSPECIFIED: ICD-10-CM

## 2019-09-03 ENCOUNTER — OUTPATIENT (OUTPATIENT)
Dept: OUTPATIENT SERVICES | Facility: HOSPITAL | Age: 34
LOS: 1 days | Discharge: HOME | End: 2019-09-03

## 2019-09-03 ENCOUNTER — APPOINTMENT (OUTPATIENT)
Dept: INTERNAL MEDICINE | Facility: CLINIC | Age: 34
End: 2019-09-03

## 2019-09-03 VITALS
WEIGHT: 166 LBS | BODY MASS INDEX: 28.34 KG/M2 | DIASTOLIC BLOOD PRESSURE: 77 MMHG | HEIGHT: 64 IN | SYSTOLIC BLOOD PRESSURE: 114 MMHG | HEART RATE: 62 BPM

## 2019-09-03 DIAGNOSIS — Z85.07 PERSONAL HISTORY OF MALIGNANT NEOPLASM OF PANCREAS: Chronic | ICD-10-CM

## 2019-09-03 DIAGNOSIS — K29.70 GASTRITIS, UNSPECIFIED, W/OUT BLEEDING: ICD-10-CM
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
## 2019-09-03 DIAGNOSIS — S61.239A PUNCTURE WOUND W/OUT FOREIGN BODY OF UNSPECIFIED FINGER W/OUT DAMAGE TO NAIL, INITIAL ENCOUNTER: ICD-10-CM

## 2019-09-03 RX ORDER — CLOTRIMAZOLE AND BETAMETHASONE DIPROPIONATE 10; .5 MG/G; MG/G
1-0.05 CREAM TOPICAL TWICE DAILY
Qty: 1 | Refills: 1 | Status: DISCONTINUED | COMMUNITY
Start: 2019-04-29 | End: 2019-09-03

## 2019-09-03 RX ORDER — CLINDAMYCIN PHOSPHATE 20 MG/G
2 CREAM VAGINAL
Qty: 1 | Refills: 0 | Status: DISCONTINUED | COMMUNITY
Start: 2019-04-12 | End: 2019-09-03

## 2019-09-03 RX ORDER — OMEPRAZOLE 40 MG/1
40 CAPSULE, DELAYED RELEASE ORAL TWICE DAILY
Qty: 30 | Refills: 2 | Status: DISCONTINUED | COMMUNITY
Start: 2019-08-09 | End: 2019-09-03

## 2019-09-03 RX ORDER — CLINDAMYCIN PHOSPHATE 1 G/10ML
1 GEL TOPICAL TWICE DAILY
Qty: 1 | Refills: 0 | Status: DISCONTINUED | COMMUNITY
Start: 2019-07-12 | End: 2019-09-03

## 2019-09-03 RX ORDER — NITROFURANTOIN MACROCRYSTALS 100 MG/1
100 CAPSULE ORAL
Qty: 14 | Refills: 0 | Status: DISCONTINUED | COMMUNITY
Start: 2019-03-08 | End: 2019-09-03

## 2019-09-03 RX ORDER — METRONIDAZOLE 7.5 MG/G
0.75 GEL VAGINAL
Qty: 70 | Refills: 2 | Status: DISCONTINUED | COMMUNITY
Start: 2019-04-29 | End: 2019-09-03

## 2019-09-03 NOTE — ASSESSMENT
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 \par \par #Transaminitis\par Patient eats a lot of fried food\par diet modification, will follow up LFTs in 2 months\par labs improved\par \par # Hx of Anemia - repeat CBC\par

## 2019-09-05 LAB
AMYLASE/CREAT SERPL: 97 U/L
BACTERIA UR CULT: NORMAL
H PYLORI AG STL QL: NOT DETECTED
LPL SERPL-CCNC: 25 U/L
PANCREATIC ELASTASE, FECAL: 190

## 2019-09-06 ENCOUNTER — APPOINTMENT (OUTPATIENT)
Dept: SURGERY | Facility: CLINIC | Age: 34
End: 2019-09-06
Payer: MEDICAID

## 2019-09-06 VITALS
HEIGHT: 64 IN | SYSTOLIC BLOOD PRESSURE: 122 MMHG | WEIGHT: 166 LBS | DIASTOLIC BLOOD PRESSURE: 72 MMHG | BODY MASS INDEX: 28.34 KG/M2

## 2019-09-06 PROCEDURE — 99214 OFFICE O/P EST MOD 30 MIN: CPT

## 2019-09-06 NOTE — HISTORY OF PRESENT ILLNESS
[de-identified] : 32 yo female patient without significant medical history comes today for followup visit. S/p pancreaticoduodenctomy, pylorus-preserving, complicated with a sepsis - possibly from aspiration pneumonia. She was discharged with one surgical drain. \par She had seen my partner post op on March 5th. At that time she had a well controlled, type A pancreatic fistula and early resolution of delayed gastric emptying. She was draining 30 ml a day of cloudy fluid from her left DIMITRY. She was tolerating regular diet with occasional nausea and vomiting. She was passing gas and having normal BMs. She was here today with her family for followup visit. \par She was admitted with some pain. She underwent a CT scan , blood work and culture. She possibly had a combination of post op UTI or yana pancreatic fluid. She was later discharged home on Levofloxacin and then Bactrim was added. \par 3/21/18: he is doing very well. She has a good appetite and is eating more. She ambulating and wants to go back to work. \par Her incisions have healed very well. Minimal scabs are there. \par The right DIMITRY drain is dry. The left DIMITRY drain still has some cloudy fluids the last 5 readings have been 10 , 20 , 15, 10 , 10 ml. So it is decreasing. \par 3/30/18: She again was doing very well. She had a good appetite and is eating more. She ambulating and wanted to go back to work. \par Her incisions had healed very well. The right DIMITRY drain is dry. The left DIMITRY drain still has some cloudy fluids the last 6 readings have been 10,9,9,8,7,5 So it is still decreasing. \par 4/4/18:She was doing very well. She has a good appetite and is eating more. She ambulating and wants to go back to work. \par Her incisions have healed very well. The right DIMITRY drain site was dry. The left DIMITRY drain still has some cloudy fluids the last 5 readings have been 5 ml each. So it is still decreasing. \par She is eating well and having regular bowel movements that are brown and sink. She was asked to follow up in 2 weeks for drain removal. \par She has seen Dr. Murray and is going to get a CT scan in 3 months. \par 5/4 Her drain output has only been 10 ml. She has been asymptomatic. We thus removed the drain. \par May : She is doing very well. She is very hungry and eating a lot . She is gaining weight. \par 6 month follow up : She is doing very well. She is very hungry and eating a lot . She is gaining weight. \par She has some stretching in the anterior abdominal wall when sneezing and coughing. \par We reviewed her latest labs and CT that were done by Dr. Murray\par 1 year follow up : She is doing well, She has regained normal life. She is at her pre op weight.  She has craving for sweet stuff.\par \par 1 1/2  year follow up : She was doing well, She has regained normal life. She is at her pre op weight.  \par She had been to [facility] where she ate a lot of fried food.  Since coming back she has epigastric discomfort. Which is worse on breathing deep. \par She denies nausea / vomiting.  Her stools are brown , well formed. She denies any blood in the stool \par 8/2019: With PPI things have not improved. She has dysuria.  She has epigastric pain worse on deep breaths.  LFts and CBC are normal \par SHe had a needle stick injury at work. - HIV and HEPC tests are negative.  Will order amylase lipase and stool elastase.\par \par 9/6: She is getting better. PPI helped her. Her pain with respiration has improved.

## 2019-09-06 NOTE — PHYSICAL EXAM
[Normal Thyroid] : the thyroid was normal [Respiratory Effort] : normal respiratory effort [Normal Rate and Rhythm] : normal rate and rhythm [Purpura] : no purpura  [Alert] : alert [Calm] : calm [de-identified] : Normal [de-identified] : Normal [de-identified] : Normal [de-identified] : well healed scars. no hernia [de-identified] : Normal

## 2019-09-06 NOTE — ASSESSMENT
[FreeTextEntry1] : 32 yo female patient without significant medical history comes today for followup visit. S/p pancreaticoduodenctomy, pylorus-preserving, complicated with a sepsis - possibly from aspiration pneumonia. She was discharged with one surgical drain. \par She had seen my partner post op on March 5th. At that time she had a well controlled, type A pancreatic fistula and early resolution of delayed gastric emptying. She was draining 30 ml a day of cloudy fluid from her left DIMITRY. She was tolerating regular diet with occasional nausea and vomiting. She was passing gas and having normal BMs. She was here today with her family for followup visit. \par She was admitted with some pain. She underwent a CT scan , blood work and culture. She possibly had a combination of post op UTI or yana pancreatic fluid. She was later discharged home on Levofloxacin and then Bactrim was added. \par 3/21/18: he is doing very well. She has a good appetite and is eating more. She ambulating and wants to go back to work. \par Her incisions have healed very well. Minimal scabs are there. \par The right DIMITRY drain is dry. The left DIMITRY drain still has some cloudy fluids the last 5 readings have been 10 , 20 , 15, 10 , 10 ml. So it is decreasing. \par 3/30/18: She again was doing very well. She had a good appetite and is eating more. She ambulating and wanted to go back to work. \par Her incisions had healed very well. The right DIMITRY drain is dry. The left DIMITRY drain still has some cloudy fluids the last 6 readings have been 10,9,9,8,7,5 So it is still decreasing. \par 4/4/18:She was doing very well. She has a good appetite and is eating more. She ambulating and wants to go back to work. \par Her incisions have healed very well. The right DIMITRY drain site was dry. The left DIMITRY drain still has some cloudy fluids the last 5 readings have been 5 ml each. So it is still decreasing. \par She is eating well and having regular bowel movements that are brown and sink. She was asked to follow up in 2 weeks for drain removal. \par She has seen Dr. Murray and is going to get a CT scan in 3 months. \par 5/4 Her drain output has only been 10 ml. She has been asymptomatic. We thus removed the drain. \par May : She is doing very well. She is very hungry and eating a lot . She is gaining weight. \par 6 month follow up : She is doing very well. She is very hungry and eating a lot . She is gaining weight. \par She has some stretching in the anterior abdominal wall when sneezing and coughing. \par We reviewed her latest labs and CT that were done by Dr. Murray\par 1 year follow up : She is doing well, She has regained normal life. She is at her pre op weight.  She has craving for sweet stuff.\par \par 1 1/2  year follow up : She was doing well, She has regained normal life. She is at her pre op weight.  \par She had been to [facility] where she ate a lot of fried food.  Since coming back she has epigastric discomfort. Which is worse on breathing deep. \par She denies nausea / vomiting.  Her stools are brown , well formed. She denies any blood in the stool \par 8/2019: With PPI things have not improved. She has dysuria.  She has epigastric pain worse on deep breaths.  LFts and CBC are normal \par SHe had a needle stick injury at work. - HIV and HEPC tests are negative.  Will order amylase lipase and stool elastase.\par \par 9/6: She is getting better. PPI helped her. Her pain with respiration has improved.\par \par \par We explained in great detail the pathophysiology of the disease process. We discussed the workup for diagnosis and management.The Post operative care was explained to the patient. She was counselled on diet , exercise and wound care.\par I discussed that the discomfort could be from various reasons. As she is having well formed brown stools that do no float or smell - I do think she is any of the anastomosis is causing any problems. Her LFTs were also improving before. \par CT scan is normal . \par There is some colonic thickening. \par \par We will refer her to see Dr. Hoffman for EGD / Colonoscopy\par \par We discussed the importance of close follow up. \par We informed that she needs to follow up in 4- 6 months.\par We also informed that she can call us if anything changes or has any questions.\par \par

## 2019-09-09 DIAGNOSIS — C80.1 MALIGNANT (PRIMARY) NEOPLASM, UNSPECIFIED: ICD-10-CM

## 2019-09-09 DIAGNOSIS — K29.70 GASTRITIS, UNSPECIFIED, WITHOUT BLEEDING: ICD-10-CM

## 2019-10-11 ENCOUNTER — APPOINTMENT (OUTPATIENT)
Dept: INTERNAL MEDICINE | Facility: CLINIC | Age: 34
End: 2019-10-11

## 2019-11-12 ENCOUNTER — LABORATORY RESULT (OUTPATIENT)
Age: 34
End: 2019-11-12

## 2019-11-13 LAB
HBV SURFACE AG SER QL: NONREACTIVE
HCV AB SER QL: NONREACTIVE
HCV S/CO RATIO: 0.12 S/CO

## 2019-11-15 ENCOUNTER — OUTPATIENT (OUTPATIENT)
Dept: OUTPATIENT SERVICES | Facility: HOSPITAL | Age: 34
LOS: 1 days | Discharge: HOME | End: 2019-11-15

## 2019-11-15 ENCOUNTER — APPOINTMENT (OUTPATIENT)
Dept: INTERNAL MEDICINE | Facility: CLINIC | Age: 34
End: 2019-11-15
Payer: MEDICAID

## 2019-11-15 VITALS
SYSTOLIC BLOOD PRESSURE: 133 MMHG | WEIGHT: 155 LBS | DIASTOLIC BLOOD PRESSURE: 75 MMHG | BODY MASS INDEX: 26.46 KG/M2 | HEIGHT: 64 IN | TEMPERATURE: 95 F

## 2019-11-15 DIAGNOSIS — R10.9 UNSPECIFIED ABDOMINAL PAIN: ICD-10-CM

## 2019-11-15 DIAGNOSIS — Z85.07 PERSONAL HISTORY OF MALIGNANT NEOPLASM OF PANCREAS: Chronic | ICD-10-CM

## 2019-11-15 PROCEDURE — 99212 OFFICE O/P EST SF 10 MIN: CPT

## 2019-11-15 NOTE — ASSESSMENT
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 \par \par #Transaminitis\par Patient eats a lot of fried food\par diet modification, will follow up LFTs in 2 months\par labs improved\par \par HCM \par flu vaccine given\par . \par

## 2019-11-16 DIAGNOSIS — R10.9 UNSPECIFIED ABDOMINAL PAIN: ICD-10-CM

## 2019-11-25 ENCOUNTER — APPOINTMENT (OUTPATIENT)
Dept: OBGYN | Facility: CLINIC | Age: 34
End: 2019-11-25
Payer: MEDICAID

## 2019-11-25 ENCOUNTER — OUTPATIENT (OUTPATIENT)
Dept: OUTPATIENT SERVICES | Facility: HOSPITAL | Age: 34
LOS: 1 days | Discharge: HOME | End: 2019-11-25

## 2019-11-25 VITALS
DIASTOLIC BLOOD PRESSURE: 76 MMHG | BODY MASS INDEX: 28.68 KG/M2 | WEIGHT: 168 LBS | SYSTOLIC BLOOD PRESSURE: 122 MMHG | HEIGHT: 64 IN

## 2019-11-25 DIAGNOSIS — Z85.07 PERSONAL HISTORY OF MALIGNANT NEOPLASM OF PANCREAS: Chronic | ICD-10-CM

## 2019-11-25 PROCEDURE — 99395 PREV VISIT EST AGE 18-39: CPT

## 2019-11-26 DIAGNOSIS — Z01.419 ENCOUNTER FOR GYNECOLOGICAL EXAMINATION (GENERAL) (ROUTINE) WITHOUT ABNORMAL FINDINGS: ICD-10-CM

## 2020-01-26 ENCOUNTER — FORM ENCOUNTER (OUTPATIENT)
Age: 35
End: 2020-01-26

## 2020-01-27 ENCOUNTER — OUTPATIENT (OUTPATIENT)
Dept: OUTPATIENT SERVICES | Facility: HOSPITAL | Age: 35
LOS: 1 days | Discharge: HOME | End: 2020-01-27
Payer: MEDICAID

## 2020-01-27 ENCOUNTER — APPOINTMENT (OUTPATIENT)
Dept: HEMATOLOGY ONCOLOGY | Facility: CLINIC | Age: 35
End: 2020-01-27
Payer: MEDICAID

## 2020-01-27 ENCOUNTER — LABORATORY RESULT (OUTPATIENT)
Age: 35
End: 2020-01-27

## 2020-01-27 VITALS
WEIGHT: 172 LBS | DIASTOLIC BLOOD PRESSURE: 73 MMHG | SYSTOLIC BLOOD PRESSURE: 98 MMHG | BODY MASS INDEX: 29.37 KG/M2 | TEMPERATURE: 98.7 F | HEIGHT: 64 IN | HEART RATE: 70 BPM | RESPIRATION RATE: 18 BRPM

## 2020-01-27 DIAGNOSIS — K86.89 OTHER SPECIFIED DISEASES OF PANCREAS: ICD-10-CM

## 2020-01-27 DIAGNOSIS — Z85.07 PERSONAL HISTORY OF MALIGNANT NEOPLASM OF PANCREAS: Chronic | ICD-10-CM

## 2020-01-27 LAB
HCT VFR BLD CALC: 35.6 %
HGB BLD-MCNC: 11.6 G/DL
MCHC RBC-ENTMCNC: 26.6 PG
MCHC RBC-ENTMCNC: 32.6 G/DL
MCV RBC AUTO: 81.7 FL
PLATELET # BLD AUTO: 265 K/UL
PMV BLD: 11 FL
RBC # BLD: 4.36 M/UL
RBC # FLD: 14.2 %
WBC # FLD AUTO: 8.23 K/UL

## 2020-01-27 PROCEDURE — 72072 X-RAY EXAM THORAC SPINE 3VWS: CPT | Mod: 26

## 2020-01-27 PROCEDURE — 99214 OFFICE O/P EST MOD 30 MIN: CPT

## 2020-01-27 NOTE — ASSESSMENT
[FreeTextEntry1] : 32yo F with solid pseudo-papillary neoplasm of head of  pancreas pT2 pN0 s/p R0 resection .\par Iron deficiency anemia , resolved . Has regular  menses and is considering having more children . \par PLAN:\par Solid pseudo-papillary neoplasm of head of  pancreas:\par - pT2 pN0 s/p R0 resection\par \par \par Interscapular pain , probably muscular . will check thoracic spine xray , blood work , prescribed meloxicam , flexeril PRN , also recommend avoid strenuous activity for few days . follow up in 2 weeks if not improved .

## 2020-01-27 NOTE — PHYSICAL EXAM
[Normal] : grossly intact [de-identified] : healed scar of surgery , no tenderness or redness. no palpable masses.  [de-identified] : mild tenderness over mid thoracic spine  [de-identified] : no focal deficits .

## 2020-01-27 NOTE — HISTORY OF PRESENT ILLNESS
[de-identified] : \par 32 yo female , Icelandic speaking, born in [place] , interviewed with  # [id number]. Patient with  with no significant  significant medical history . S/p pancreaticoduodenectomy, pylorus-preserving R0 resection of  for pancreatic mass diagnosed when she presented with epigastric and periumbilical swelling for 3 months.  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  surgery complicated with a sepsis - possibly from aspiration pneumonia. \par type A pancreatic fistula and early resolution of delayed gastric emptying. She continues to have drainage 10 to 20cc daily . She denies fever, nausea, vomiting , abdominal pain or diarrhea. \par \par \par \par  [de-identified] : 06/14/2018 patient returns for follow up , she feels well , no abdominal pain or weight loss. no nausea or vomiting . Blood work was notable for mild normocytic anemia , Ferritin 38 . She denies hematochezia or heavy menses. \par \par 08/21/2018 : Patient returns for follow up , she is on iron one daily , Hb improved to 12. She complains of slight increase in epigastric discomfort , no nausea, vomiting , pruritis, change in bowel habits. no weight loss. \par \par 06/03/2019 Patient returns for history of pancreatic cancer s/p Whipple's with no evidence of disease. Iron deficiency anemia s/p venofer .She feels well and denies any GI symptoms . no weight loss, nausea vomiting . Recent blood work shows slightly elevated alkaline phosphatase .\par \par 01/27/2020 Patient returns with chief complaint of interscapular pain for 2 to 3 weeks , worse with activity and present at night . 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 She reports urinary frequency without dysuria , change in bowel habits , leg weakness or numbness.

## 2020-01-28 LAB
ALBUMIN SERPL ELPH-MCNC: 4.3 G/DL
ALP BLD-CCNC: 108 U/L
ALT SERPL-CCNC: 29 U/L
ANION GAP SERPL CALC-SCNC: 13 MMOL/L
AST SERPL-CCNC: 23 U/L
BILIRUB SERPL-MCNC: 0.5 MG/DL
BUN SERPL-MCNC: 15 MG/DL
CALCIUM SERPL-MCNC: 9.1 MG/DL
CANCER AG19-9 SERPL-ACNC: 13 U/ML
CHLORIDE SERPL-SCNC: 104 MMOL/L
CO2 SERPL-SCNC: 22 MMOL/L
CREAT SERPL-MCNC: 0.5 MG/DL
FERRITIN SERPL-MCNC: 8 NG/ML
GLUCOSE SERPL-MCNC: 120 MG/DL
POTASSIUM SERPL-SCNC: 4.5 MMOL/L
PROT SERPL-MCNC: 7.5 G/DL
SODIUM SERPL-SCNC: 139 MMOL/L

## 2020-01-30 ENCOUNTER — APPOINTMENT (OUTPATIENT)
Dept: INFUSION THERAPY | Facility: CLINIC | Age: 35
End: 2020-01-30

## 2020-01-30 RX ORDER — IRON SUCROSE 20 MG/ML
200 INJECTION, SOLUTION INTRAVENOUS ONCE
Refills: 0 | Status: COMPLETED | OUTPATIENT
Start: 2020-01-30 | End: 2020-01-30

## 2020-01-30 RX ADMIN — IRON SUCROSE 220 MILLIGRAM(S): 20 INJECTION, SOLUTION INTRAVENOUS at 15:54

## 2020-02-06 ENCOUNTER — APPOINTMENT (OUTPATIENT)
Dept: INFUSION THERAPY | Facility: CLINIC | Age: 35
End: 2020-02-06

## 2020-02-06 RX ORDER — IRON SUCROSE 20 MG/ML
200 INJECTION, SOLUTION INTRAVENOUS ONCE
Refills: 0 | Status: COMPLETED | OUTPATIENT
Start: 2020-02-06 | End: 2020-02-06

## 2020-02-06 RX ADMIN — IRON SUCROSE 220 MILLIGRAM(S): 20 INJECTION, SOLUTION INTRAVENOUS at 15:41

## 2020-02-06 RX ADMIN — IRON SUCROSE 200 MILLIGRAM(S): 20 INJECTION, SOLUTION INTRAVENOUS at 16:12

## 2020-02-10 ENCOUNTER — LABORATORY RESULT (OUTPATIENT)
Age: 35
End: 2020-02-10

## 2020-02-10 ENCOUNTER — APPOINTMENT (OUTPATIENT)
Dept: HEMATOLOGY ONCOLOGY | Facility: CLINIC | Age: 35
End: 2020-02-10
Payer: MEDICAID

## 2020-02-10 VITALS
HEIGHT: 64 IN | WEIGHT: 174 LBS | HEART RATE: 74 BPM | TEMPERATURE: 96.6 F | DIASTOLIC BLOOD PRESSURE: 65 MMHG | BODY MASS INDEX: 29.71 KG/M2 | SYSTOLIC BLOOD PRESSURE: 114 MMHG | RESPIRATION RATE: 14 BRPM

## 2020-02-10 LAB
APPEARANCE: ABNORMAL
BILIRUBIN URINE: NEGATIVE
BLOOD URINE: ABNORMAL
COLOR: YELLOW
GLUCOSE QUALITATIVE U: NEGATIVE
KETONES URINE: NEGATIVE
LEUKOCYTE ESTERASE URINE: ABNORMAL
NITRITE URINE: NEGATIVE
PH URINE: 6.5
PROTEIN URINE: NEGATIVE
SPECIFIC GRAVITY URINE: 1.02
UROBILINOGEN URINE: NORMAL

## 2020-02-10 PROCEDURE — 99213 OFFICE O/P EST LOW 20 MIN: CPT

## 2020-02-11 NOTE — PHYSICAL EXAM
[Normal] : grossly intact [de-identified] : healed scar of surgery , no tenderness or redness. no palpable masses.  [de-identified] : mild tenderness over mid thoracic spine  [de-identified] : no focal deficits .

## 2020-02-11 NOTE — HISTORY OF PRESENT ILLNESS
[de-identified] : 06/14/2018 patient returns for follow up , she feels well , no abdominal pain or weight loss. no nausea or vomiting . Blood work was notable for mild normocytic anemia , Ferritin 38 . She denies hematochezia or heavy menses. \par \par 08/21/2018 : Patient returns for follow up , she is on iron one daily , Hb improved to 12. She complains of slight increase in epigastric discomfort , no nausea, vomiting , pruritis, change in bowel habits. no weight loss. \par \par 06/03/2019 Patient returns for history of pancreatic cancer s/p Whipple's with no evidence of disease. Iron deficiency anemia s/p venofer .She feels well and denies any GI symptoms . no weight loss, nausea vomiting . Recent blood work shows slightly elevated alkaline phosphatase .\par \par 01/27/2020 Patient returns with chief complaint of interscapular pain for 2 to 3 weeks , worse with activity and present at night . 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 She reports urinary frequency without dysuria , change in bowel habits , leg weakness or numbness. \par \par 02/10/2020\par Patient returns for history of pancreatic cancer s/p Whipple's with no evidence of disease. Ferritin of 8 noted on 01/27/2020. On venofer. Her interscapular pain is better with cyclobenzaprine. X ray did not reveal any fractures. She complains of increased urinary frequency. No fevers. \par Her menses are regular and normal.  [de-identified] : \par 34 yo female , Indonesian speaking, born in [place] , interviewed with  # [id number]. Patient with  with no significant  significant medical history . S/p pancreaticoduodenectomy, pylorus-preserving R0 resection of  for pancreatic mass diagnosed when she presented with epigastric and periumbilical swelling for 3 months.  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  surgery complicated with a sepsis - possibly from aspiration pneumonia. \par type A pancreatic fistula and early resolution of delayed gastric emptying. She continues to have drainage 10 to 20cc daily . She denies fever, nausea, vomiting , abdominal pain or diarrhea. \par \par \par \par

## 2020-02-11 NOTE — ASSESSMENT
[FreeTextEntry1] : 32yo F with solid pseudo-papillary neoplasm of head of  pancreas pT2 pN0 s/p R0 resection .\par Iron deficiency anemia on Venofer\par \par PLAN:\par Solid pseudo-papillary neoplasm of head of  pancreas:\par - pT2 pN0 s/p R0 resection\par - Last Imaging was in Sep 2019. Will do yearly surveillance imaging from now on. \par \par  #Interscapular pain , probably muscular. thoracic spine x-ray negative for fracture. \par \par # iron deficiency anemia : on venofer x 5 doses. \par \par # Dysuria: u/a shows large leukocytes. Will start po abx. \par \par RTC in 3 months. \par Patient seen and examined with Dr. Murray

## 2020-02-13 ENCOUNTER — APPOINTMENT (OUTPATIENT)
Dept: INFUSION THERAPY | Facility: CLINIC | Age: 35
End: 2020-02-13

## 2020-02-13 RX ORDER — IRON SUCROSE 20 MG/ML
200 INJECTION, SOLUTION INTRAVENOUS ONCE
Refills: 0 | Status: COMPLETED | OUTPATIENT
Start: 2020-02-13 | End: 2020-02-13

## 2020-02-13 RX ADMIN — IRON SUCROSE 220 MILLIGRAM(S): 20 INJECTION, SOLUTION INTRAVENOUS at 15:43

## 2020-02-13 RX ADMIN — IRON SUCROSE 200 MILLIGRAM(S): 20 INJECTION, SOLUTION INTRAVENOUS at 16:15

## 2020-02-18 ENCOUNTER — LABORATORY RESULT (OUTPATIENT)
Age: 35
End: 2020-02-18

## 2020-02-18 NOTE — PATIENT PROFILE ADULT. - TEACHING/LEARNING FACTORS INFLUENCE READINESS TO LEARN
[facility] ambulatory encounter  FAMILY PRACTICE OFFICE VISIT    CHIEF COMPLAINT:    Chief Complaint   Patient presents with   â¢ Medication Management     would like to discuss birth control options   â¢ Sexual Problem     pain with intercourse       SUBJECTIVE:  Jennie Padilla is a 47year old female who presented requesting evaluation for irregular menses and pain with sex. Came off her BCP at advise of her prior pcp who felt bc she was 48 y/o she was no longer a good candidate for estrogen containing bcp. Pt does not have h/o PE/DVT, HTN, stroke, cancer, or migraine HA w/ aura. She is a nonsmoker. She tolerated her BCP well. She would manipulate her menses to get her periods q12 wks, loved the predictability. She thinks her menses are coming too frequently however I reviewed and wrote down her period chart (below) and they are q24 to roughly 29 days, lasting 4-6 days, mild cramping, sometimes heavier but not clinically too heavy. She is traveling a lot w/ her  and wants to  Know when her menses will be.    1/28 5-6 days  1/4 x 4 days   12/3 x   11/8  10/15  9/18     No hot flashes or night sweats  Pain with intercourse always on the left side of the pelvis, only in missionary position but not every time in this position. Is active daily, this is not a new partner. Pain is not with insertion, it is more deeper thrusting. Occurs intermittent, onset around 2 yrs ago. Having sex nearly daily, this is an increase but pain started prior to the increase. Pain is mild to moderate. Reports h/o abnormal pap smear but always resolved on recheck. Has had previous paps at outside system, reports this was done either 4/2019 or 4/2018, negative. Recent sti screen negative. Review of systems:   As above     OBJECTIVE:  PROBLEM LIST:   There is no problem list on file for this patient.       PAST HISTORIES:   ALLERGIES:   Allergen Reactions   â¢ Poultry Meal   (Food Or Med) Other (See Comments) Esophagus spasms     Current Outpatient Medications   Medication Sig Dispense Refill   â¢ COLLAGEN-VITAMIN C PO Take 6 tablets by mouth daily. Collagen 6000 mg; Vitamin C 60 mg     â¢ desogestrel-ethinyl estradiol (APRI) 0.15-30 MG-MCG per tablet Take active (hormone tablets) daily for 12 wks. Then take inactive (sugar pills) for 1 wk. 112 tablet 3     No current facility-administered medications for this visit. No past medical history on file. PHYSICAL EXAM:   Vital Signs:    Visit Vitals  /76 (BP Location: RUE - Right upper extremity, Patient Position: Sitting)   Pulse 76   Wt 68.9 kg   SpO2 100%   BMI 24.15 kg/mÂ²     Pulse Ox Interpretation:  Within normal limits. General:   Alert, cooperative, conversive in no acute distress. Skin:  Warm and dry without rash. Head:  Normocephalic, atraumatic. Neck:  Trachea is midline. Eyes:  Normal conjunctivae and sclerae. ENT:  Mucous membranes are moist.   Respiratory:   Normal respiratory effort. Musculoskeletal:  No edema. Neurologic:  Oriented x4. No focal deficits. Psychiatric:  Cooperative. Appropriate mood and affect. : external genitalia unremarkable. With insertion of spec, multiparous cervix. No lesions detected. No cervicitis seen. On bimanual exam I do not detect abnormalities to the uterus or ovaries. Bimanual exam is not painful. LAB RESULTS:   Ordered and pending    ASSESSMENT:   1. OCP (oral contraceptive pills) initiation    2. Screening for thyroid disorder    3. Screening for deficiency anemia    4. Dyspareunia, female    5. Screening for condition        PLAN:   Orders Placed This Encounter   â¢ Thyroid Stimulating Hormone Reflex   â¢ CBC with Automated Differential   â¢ Hepatic Function Panel   â¢ US Pelvis Complete Non OB   â¢ COLLAGEN-VITAMIN C PO   â¢ desogestrel-ethinyl estradiol (APRI) 0.15-30 MG-MCG per tablet       R/b/ses of BCP d/w pt, she is ok candidate, this was sent through for her.  I did review w/ her in particular risk of fatal blood clot she is aware of this and wants to proceed. Her bleeding pattern is quite normal, she was reassured however I will check above labs and then also pelvic US for the dyspareunia. She would consider pelvic floor therapy if US normal.      Return if symptoms worsen or fail to improve, for but needs nurse BP check 4-8 wks . Instructions provided as documented in the after visit summary. The patient indicated understanding of the diagnosis and agreed with the plan of care. acuteness of illness

## 2020-02-19 LAB
ALBUMIN SERPL ELPH-MCNC: 4.3 G/DL
ALP BLD-CCNC: 111 U/L
ALT SERPL-CCNC: 25 U/L
ANION GAP SERPL CALC-SCNC: 12 MMOL/L
AST SERPL-CCNC: 20 U/L
BILIRUB SERPL-MCNC: 0.4 MG/DL
BUN SERPL-MCNC: 11 MG/DL
CALCIUM SERPL-MCNC: 9.4 MG/DL
CHLORIDE SERPL-SCNC: 101 MMOL/L
CHOLEST SERPL-MCNC: 119 MG/DL
CHOLEST/HDLC SERPL: 2.5 RATIO
CO2 SERPL-SCNC: 23 MMOL/L
CREAT SERPL-MCNC: 0.6 MG/DL
ESTIMATED AVERAGE GLUCOSE: 108 MG/DL
GLUCOSE SERPL-MCNC: 86 MG/DL
HBA1C MFR BLD HPLC: 5.4 %
HDLC SERPL-MCNC: 48 MG/DL
LDLC SERPL CALC-MCNC: 72 MG/DL
POTASSIUM SERPL-SCNC: 4.8 MMOL/L
PROT SERPL-MCNC: 7.4 G/DL
SODIUM SERPL-SCNC: 136 MMOL/L
TRIGL SERPL-MCNC: 81 MG/DL

## 2020-02-20 ENCOUNTER — APPOINTMENT (OUTPATIENT)
Dept: INFUSION THERAPY | Facility: CLINIC | Age: 35
End: 2020-02-20

## 2020-02-20 RX ORDER — IRON SUCROSE 20 MG/ML
200 INJECTION, SOLUTION INTRAVENOUS ONCE
Refills: 0 | Status: COMPLETED | OUTPATIENT
Start: 2020-02-20 | End: 2020-02-20

## 2020-02-20 RX ADMIN — IRON SUCROSE 220 MILLIGRAM(S): 20 INJECTION, SOLUTION INTRAVENOUS at 15:50

## 2020-02-20 RX ADMIN — IRON SUCROSE 200 MILLIGRAM(S): 20 INJECTION, SOLUTION INTRAVENOUS at 16:20

## 2020-02-27 ENCOUNTER — APPOINTMENT (OUTPATIENT)
Dept: INFUSION THERAPY | Facility: CLINIC | Age: 35
End: 2020-02-27

## 2020-02-27 ENCOUNTER — OUTPATIENT (OUTPATIENT)
Dept: OUTPATIENT SERVICES | Facility: HOSPITAL | Age: 35
LOS: 1 days | Discharge: HOME | End: 2020-02-27

## 2020-02-27 DIAGNOSIS — Z85.07 PERSONAL HISTORY OF MALIGNANT NEOPLASM OF PANCREAS: Chronic | ICD-10-CM

## 2020-02-27 DIAGNOSIS — K86.89 OTHER SPECIFIED DISEASES OF PANCREAS: ICD-10-CM

## 2020-02-27 DIAGNOSIS — C80.1 MALIGNANT (PRIMARY) NEOPLASM, UNSPECIFIED: ICD-10-CM

## 2020-02-27 DIAGNOSIS — R30.0 DYSURIA: ICD-10-CM

## 2020-02-27 RX ORDER — IRON SUCROSE 20 MG/ML
200 INJECTION, SOLUTION INTRAVENOUS ONCE
Refills: 0 | Status: COMPLETED | OUTPATIENT
Start: 2020-02-27 | End: 2020-02-27

## 2020-02-27 RX ADMIN — IRON SUCROSE 220 MILLIGRAM(S): 20 INJECTION, SOLUTION INTRAVENOUS at 15:45

## 2020-02-27 RX ADMIN — IRON SUCROSE 200 MILLIGRAM(S): 20 INJECTION, SOLUTION INTRAVENOUS at 16:15

## 2020-02-28 ENCOUNTER — OUTPATIENT (OUTPATIENT)
Dept: OUTPATIENT SERVICES | Facility: HOSPITAL | Age: 35
LOS: 1 days | Discharge: HOME | End: 2020-02-28

## 2020-02-28 ENCOUNTER — APPOINTMENT (OUTPATIENT)
Dept: INTERNAL MEDICINE | Facility: CLINIC | Age: 35
End: 2020-02-28
Payer: MEDICAID

## 2020-02-28 VITALS
BODY MASS INDEX: 29.53 KG/M2 | WEIGHT: 173 LBS | HEIGHT: 64 IN | TEMPERATURE: 97.3 F | HEART RATE: 67 BPM | DIASTOLIC BLOOD PRESSURE: 65 MMHG | SYSTOLIC BLOOD PRESSURE: 100 MMHG

## 2020-02-28 DIAGNOSIS — Z85.07 PERSONAL HISTORY OF MALIGNANT NEOPLASM OF PANCREAS: Chronic | ICD-10-CM

## 2020-02-28 PROCEDURE — 99213 OFFICE O/P EST LOW 20 MIN: CPT | Mod: GC

## 2020-02-28 RX ORDER — PANTOPRAZOLE 40 MG/1
40 TABLET, DELAYED RELEASE ORAL DAILY
Qty: 30 | Refills: 5 | Status: COMPLETED | COMMUNITY
Start: 2019-09-03 | End: 2020-02-28

## 2020-02-28 NOTE — ASSESSMENT
[FreeTextEntry1] : 35F with PMHx of solid pseudo-papillary neoplasm s/p Whipple, iron deficiency anemia, transaminitis presents for followup.\par \par Solid pseudo-papillary neoplasm s/p Whipple\par - Annual surveillance\par - Following with oncology\par \par EDWIN: Hg 11.8, Ferritin 8\par - Completed venofer x5 with hematology yesterday\par - F/u onc\par - Check CBC, ferritin\par - Ordered FIT-DNA\par \par Cervicalgia: \par - NSAID prn\par \par UTI: Symptoms improving\par - Complete course of abx\par \par Transaminitis: Resolved\par \par HCM:\par - States she already received flu this year\par

## 2020-02-28 NOTE — HISTORY OF PRESENT ILLNESS
[de-identified] : 35F with PMHx of solid pseudo-papillary neoplasm s/p Whipple, iron deficiency anemia, transaminitis presents for followup.\par Since last visit, patient was complaining of interscapular pain and started on Mobic and cyclobenzaprine which resolved the pain, also had XR which showed mild degenerative changes\par Patient had urinary frequency and was started on cipro, symptoms now better. \par No other complaints.

## 2020-03-04 DIAGNOSIS — D64.9 ANEMIA, UNSPECIFIED: ICD-10-CM

## 2020-03-04 DIAGNOSIS — C80.1 MALIGNANT (PRIMARY) NEOPLASM, UNSPECIFIED: ICD-10-CM

## 2020-03-04 DIAGNOSIS — Z00.00 ENCOUNTER FOR GENERAL ADULT MEDICAL EXAMINATION WITHOUT ABNORMAL FINDINGS: ICD-10-CM

## 2020-03-06 ENCOUNTER — APPOINTMENT (OUTPATIENT)
Dept: SURGERY | Facility: CLINIC | Age: 35
End: 2020-03-06
Payer: MEDICAID

## 2020-03-06 VITALS
TEMPERATURE: 98.4 F | DIASTOLIC BLOOD PRESSURE: 70 MMHG | HEART RATE: 76 BPM | HEIGHT: 64 IN | SYSTOLIC BLOOD PRESSURE: 102 MMHG | WEIGHT: 172 LBS | BODY MASS INDEX: 29.37 KG/M2

## 2020-03-06 PROCEDURE — 99213 OFFICE O/P EST LOW 20 MIN: CPT

## 2020-03-06 NOTE — PHYSICAL EXAM
[Normal Thyroid] : the thyroid was normal [Respiratory Effort] : normal respiratory effort [Normal Rate and Rhythm] : normal rate and rhythm [Purpura] : no purpura  [Alert] : alert [Calm] : calm [de-identified] : Normal [de-identified] : Normal [de-identified] : Normal [de-identified] : well healed scars. no hernia [de-identified] : Normal

## 2020-03-06 NOTE — ASSESSMENT
[FreeTextEntry1] : 34 yo female patient without significant medical history comes today for followup visit. S/p pancreaticoduodenctomy, pylorus-preserving, complicated with a sepsis - possibly from aspiration pneumonia. She was discharged with one surgical drain. \par She had seen my partner post op on March 5th. At that time she had a well controlled, type A pancreatic fistula and early resolution of delayed gastric emptying. She was draining 30 ml a day of cloudy fluid from her left DIMITRY. She was tolerating regular diet with occasional nausea and vomiting. She was passing gas and having normal BMs. She was here today with her family for followup visit. \par She was admitted with some pain. She underwent a CT scan , blood work and culture. She possibly had a combination of post op UTI or yana pancreatic fluid. She was later discharged home on Levofloxacin and then Bactrim was added. \par 3/21/18: he is doing very well. She has a good appetite and is eating more. She ambulating and wants to go back to work. \par Her incisions have healed very well. Minimal scabs are there. \par The right DIMITRY drain is dry. The left DIMITRY drain still has some cloudy fluids the last 5 readings have been 10 , 20 , 15, 10 , 10 ml. So it is decreasing. \par 3/30/18: She again was doing very well. She had a good appetite and is eating more. She ambulating and wanted to go back to work. \par Her incisions had healed very well. The right DIMITRY drain is dry. The left DIMITRY drain still has some cloudy fluids the last 6 readings have been 10,9,9,8,7,5 So it is still decreasing. \par 4/4/18:She was doing very well. She has a good appetite and is eating more. She ambulating and wants to go back to work. \par Her incisions have healed very well. The right DIMITRY drain site was dry. The left DIMITRY drain still has some cloudy fluids the last 5 readings have been 5 ml each. So it is still decreasing. \par She is eating well and having regular bowel movements that are brown and sink. She was asked to follow up in 2 weeks for drain removal. \par She has seen Dr. Murray and is going to get a CT scan in 3 months. \par 5/4 Her drain output has only been 10 ml. She has been asymptomatic. We thus removed the drain. \par May : She is doing very well. She is very hungry and eating a lot . She is gaining weight. \par 6 month follow up : She is doing very well. She is very hungry and eating a lot . She is gaining weight. \par She has some stretching in the anterior abdominal wall when sneezing and coughing. \par We reviewed her latest labs and CT that were done by Dr. Murray\par 1 year follow up : She is doing well, She has regained normal life. She is at her pre op weight.  She has craving for sweet stuff.\par 3/6/2020: \par \par 1 1/2  year follow up : She was doing well, She has regained normal life. She is at her pre op weight.  \par She had been to [facility] where she ate a lot of fried food.  Since coming back she has epigastric discomfort. Which is worse on breathing deep. \par She denies nausea / vomiting.  Her stools are brown , well formed. She denies any blood in the stool \par 8/2019: With PPI things have not improved. She has dysuria.  She has epigastric pain worse on deep breaths.  LFts and CBC are normal \par SHe had a needle stick injury at work. - HIV and HEPC tests are negative.  Will order amylase lipase and stool elastase.\par 9/6: She is getting better. PPI helped her. Her pain with respiration has improved.\par 3/6/2020 : She is completely asymptomatic. She is doing well. She has questions about the reason for anemia. She had questions if she needed the EGD / Colonoscopy. \par \par \par We explained in great detail the pathophysiology of the disease process. We discussed the workup for diagnosis and management.The Post operative care was explained to the patient. She was counselled on diet , exercise and wound care.\par I discussed that the discomfort could be from various reasons. As she is having well formed brown stools that do no float or smell - I do think she is any of the anastomosis is causing any problems. Her LFTs were also improving before. \par CT scan is normal . \par \par \par We discussed the importance of close follow up. \par We informed that she needs to follow up in 4- 6 months for a repeat CT \par We also informed that she can call us if anything changes or has any questions.\par \par

## 2020-03-06 NOTE — HISTORY OF PRESENT ILLNESS
[de-identified] : 32 yo female patient without significant medical history comes today for followup visit. S/p pancreaticoduodenctomy, pylorus-preserving, complicated with a sepsis - possibly from aspiration pneumonia. She was discharged with one surgical drain. \par She had seen my partner post op on March 5th. At that time she had a well controlled, type A pancreatic fistula and early resolution of delayed gastric emptying. She was draining 30 ml a day of cloudy fluid from her left DIMITRY. She was tolerating regular diet with occasional nausea and vomiting. She was passing gas and having normal BMs. She was here today with her family for followup visit. \par She was admitted with some pain. She underwent a CT scan , blood work and culture. She possibly had a combination of post op UTI or yana pancreatic fluid. She was later discharged home on Levofloxacin and then Bactrim was added. \par 3/21/18: he is doing very well. She has a good appetite and is eating more. She ambulating and wants to go back to work. \par Her incisions have healed very well. Minimal scabs are there. \par The right DIMITRY drain is dry. The left DIMITRY drain still has some cloudy fluids the last 5 readings have been 10 , 20 , 15, 10 , 10 ml. So it is decreasing. \par 3/30/18: She again was doing very well. She had a good appetite and is eating more. She ambulating and wanted to go back to work. \par Her incisions had healed very well. The right DIMITRY drain is dry. The left DIMITRY drain still has some cloudy fluids the last 6 readings have been 10,9,9,8,7,5 So it is still decreasing. \par 4/4/18:She was doing very well. She has a good appetite and is eating more. She ambulating and wants to go back to work. \par Her incisions have healed very well. The right DIMITRY drain site was dry. The left DIMITRY drain still has some cloudy fluids the last 5 readings have been 5 ml each. So it is still decreasing. \par She is eating well and having regular bowel movements that are brown and sink. She was asked to follow up in 2 weeks for drain removal. \par She has seen Dr. Murray and is going to get a CT scan in 3 months. \par 5/4 Her drain output has only been 10 ml. She has been asymptomatic. We thus removed the drain. \par May : She is doing very well. She is very hungry and eating a lot . She is gaining weight. \par 6 month follow up : She is doing very well. She is very hungry and eating a lot . She is gaining weight. \par She has some stretching in the anterior abdominal wall when sneezing and coughing. \par We reviewed her latest labs and CT that were done by Dr. Murray\par 1 year follow up : She is doing well, She has regained normal life. She is at her pre op weight.  She has craving for sweet stuff.\par 3/6/2020: \par \par 1 1/2  year follow up : She was doing well, She has regained normal life. She is at her pre op weight.  \par She had been to [facility] where she ate a lot of fried food.  Since coming back she has epigastric discomfort. Which is worse on breathing deep. \par She denies nausea / vomiting.  Her stools are brown , well formed. She denies any blood in the stool \par 8/2019: With PPI things have not improved. She has dysuria.  She has epigastric pain worse on deep breaths.  LFts and CBC are normal \par SHe had a needle stick injury at work. - HIV and HEPC tests are negative.  Will order amylase lipase and stool elastase.\par 9/6: She is getting better. PPI helped her. Her pain with respiration has improved.\par 3/6/2020 : She is completely asymptomatic. She is doing well. She has questions about the reason for anemia. She had questions if she needed the EGD / Colonoscopy.

## 2020-06-05 ENCOUNTER — APPOINTMENT (OUTPATIENT)
Dept: INTERNAL MEDICINE | Facility: CLINIC | Age: 35
End: 2020-06-05

## 2020-06-11 ENCOUNTER — APPOINTMENT (OUTPATIENT)
Dept: HEMATOLOGY ONCOLOGY | Facility: CLINIC | Age: 35
End: 2020-06-11
Payer: MEDICAID

## 2020-06-11 ENCOUNTER — LABORATORY RESULT (OUTPATIENT)
Age: 35
End: 2020-06-11

## 2020-06-11 ENCOUNTER — OUTPATIENT (OUTPATIENT)
Dept: OUTPATIENT SERVICES | Facility: HOSPITAL | Age: 35
LOS: 1 days | Discharge: HOME | End: 2020-06-11

## 2020-06-11 VITALS
TEMPERATURE: 98.4 F | BODY MASS INDEX: 29.19 KG/M2 | HEART RATE: 66 BPM | HEIGHT: 64 IN | SYSTOLIC BLOOD PRESSURE: 109 MMHG | RESPIRATION RATE: 16 BRPM | WEIGHT: 171 LBS | DIASTOLIC BLOOD PRESSURE: 61 MMHG

## 2020-06-11 DIAGNOSIS — Z85.07 PERSONAL HISTORY OF MALIGNANT NEOPLASM OF PANCREAS: Chronic | ICD-10-CM

## 2020-06-11 LAB
HCT VFR BLD CALC: 34.3 %
HGB BLD-MCNC: 11.5 G/DL
MCHC RBC-ENTMCNC: 30 PG
MCHC RBC-ENTMCNC: 33.5 G/DL
MCV RBC AUTO: 89.6 FL
PLATELET # BLD AUTO: 240 K/UL
PMV BLD: 11 FL
RBC # BLD: 3.83 M/UL
RBC # FLD: 12.9 %
WBC # FLD AUTO: 6.12 K/UL

## 2020-06-11 PROCEDURE — 99213 OFFICE O/P EST LOW 20 MIN: CPT

## 2020-06-12 LAB
ALBUMIN SERPL ELPH-MCNC: 4.6 G/DL
ALP BLD-CCNC: 89 U/L
ALT SERPL-CCNC: 55 U/L
ANION GAP SERPL CALC-SCNC: 11 MMOL/L
AST SERPL-CCNC: 40 U/L
BILIRUB SERPL-MCNC: 0.5 MG/DL
BUN SERPL-MCNC: 15 MG/DL
CALCIUM SERPL-MCNC: 8.7 MG/DL
CANCER AG19-9 SERPL-ACNC: 10 U/ML
CHLORIDE SERPL-SCNC: 105 MMOL/L
CO2 SERPL-SCNC: 24 MMOL/L
CREAT SERPL-MCNC: 0.6 MG/DL
FERRITIN SERPL-MCNC: 99 NG/ML
GLUCOSE SERPL-MCNC: 95 MG/DL
IRON SATN MFR SERPL: 20 %
IRON SERPL-MCNC: 67 UG/DL
POTASSIUM SERPL-SCNC: 4.5 MMOL/L
PROT SERPL-MCNC: 7.3 G/DL
SODIUM SERPL-SCNC: 140 MMOL/L
TIBC SERPL-MCNC: 342 UG/DL
UIBC SERPL-MCNC: 275 UG/DL

## 2020-06-13 NOTE — HISTORY OF PRESENT ILLNESS
[de-identified] : \par 32 yo female , [place] speaking, born in [place] , interviewed with  # [id number]. Patient with  with no significant  significant medical history . S/p pancreaticoduodenectomy, pylorus-preserving R0 resection of  for pancreatic mass diagnosed when she presented with epigastric and periumbilical swelling for 3 months.  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  surgery complicated with a sepsis - possibly from aspiration pneumonia. \par type A pancreatic fistula and early resolution of delayed gastric emptying. She continues to have drainage 10 to 20cc daily . She denies fever, nausea, vomiting , abdominal pain or diarrhea. \par \par \par \par  [de-identified] : 06/14/2018 patient returns for follow up , she feels well , no abdominal pain or weight loss. no nausea or vomiting . Blood work was notable for mild normocytic anemia , Ferritin 38 . She denies hematochezia or heavy menses. \par \par 08/21/2018 : Patient returns for follow up , she is on iron one daily , Hb improved to 12. She complains of slight increase in epigastric discomfort , no nausea, vomiting , pruritis, change in bowel habits. no weight loss. \par \par 06/03/2019 Patient returns for history of pancreatic cancer s/p Whipple's with no evidence of disease. Iron deficiency anemia s/p venofer .She feels well and denies any GI symptoms . no weight loss, nausea vomiting . Recent blood work shows slightly elevated alkaline phosphatase .\par \par 01/27/2020 Patient returns with chief complaint of interscapular pain for 2 to 3 weeks , worse with activity and present at night . 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 She reports urinary frequency without dysuria , change in bowel habits , leg weakness or numbness. \par \par 02/10/2020\par Patient returns for history of pancreatic cancer s/p Whipple's with no evidence of disease. Ferritin of 8 noted on 01/27/2020. On venofer. Her interscapular pain is better with cyclobenzaprine. X ray did not reveal any fractures. She complains of increased urinary frequency. No fevers. \par Her menses are regular and normal. \par \par 6/11/2020: SHIELA VERMA is a 35 year old female here today for follow up visit for history of EDWIN and pancreatic cancer s/p Whipples. She feels well besdies mild fatigue. She denies any GI symptoms,  weight loss, nausea, vomiting, or hematochezia. she received Venofer 200mg IV x5 doses; last dose was Feb 20, 2020. She complains of urticaria around her areolas which started about a week ago. Last mammogram was May 2019 which showed no evidence for malignancy. CBC reviewed normocytic anemia noted. Will recheck iron studies and ferritin on 6/11/2020\par

## 2020-06-13 NOTE — PHYSICAL EXAM
[Normal] : grossly intact [de-identified] : no palpable masses, no skin changes noted  [de-identified] : healed scar of surgery , no tenderness or redness. no palpable masses.  [de-identified] : no focal deficits .

## 2020-06-13 NOTE — ASSESSMENT
[FreeTextEntry1] : 32yo F with solid pseudo-papillary neoplasm of head of pancreas pT2 pN0 s/p R0 resection .\par Iron deficiency anemia completed venofer x5 in 02/2020\par \par PLAN:\par #Solid pseudo-papillary neoplasm of head of  pancreas:\par - pT2 pN0 s/p R0 resection\par - Last Imaging was in Sep 2019.\par - will repeat CT of C/A/P script given to patient \par \par # iron deficiency anemia : \par - S/p venofer x 5 doses in 02/2020\par - Continues to be anemia Hgb 11.5\par - Repeat Ferritin and Iron Studies: will schedule for additional venofer based on results  \par \par # Dysuria: resolved \par \par - Follow up with PMD or GYN if breast symptoms worsen \par \par RTC in 3 months. \par Patient seen and examined with Dr. Murray

## 2020-06-15 DIAGNOSIS — K86.89 OTHER SPECIFIED DISEASES OF PANCREAS: ICD-10-CM

## 2020-06-15 DIAGNOSIS — D64.9 ANEMIA, UNSPECIFIED: ICD-10-CM

## 2020-08-27 NOTE — PRE-ANESTHESIA EVALUATION ADULT - NSANTHDIETYNSD_GEN_ALL_CORE
Yes Staging Info: By selecting yes to the question above you will include information on AJCC 8 tumor staging in your Mohs note. Information on tumor staging will be automatically added for SCCs on the head and neck. AJCC 8 includes tumor size, tumor depth, perineural involvement and bone invasion.

## 2020-09-03 LAB
25(OH)D3 SERPL-MCNC: 18 NG/ML
ALBUMIN SERPL ELPH-MCNC: 4.5 G/DL
ALP BLD-CCNC: 107 U/L
ALT SERPL-CCNC: 45 U/L
ANION GAP SERPL CALC-SCNC: 12 MMOL/L
AST SERPL-CCNC: 22 U/L
BASOPHILS # BLD AUTO: 0.02 K/UL
BASOPHILS NFR BLD AUTO: 0.3 %
BILIRUB SERPL-MCNC: 1.1 MG/DL
BUN SERPL-MCNC: 9 MG/DL
CALCIUM SERPL-MCNC: 9.3 MG/DL
CHLORIDE SERPL-SCNC: 104 MMOL/L
CO2 SERPL-SCNC: 22 MMOL/L
CREAT SERPL-MCNC: 0.6 MG/DL
EOSINOPHIL # BLD AUTO: 0.14 K/UL
EOSINOPHIL NFR BLD AUTO: 2.3 %
FERRITIN SERPL-MCNC: 70 NG/ML
GLUCOSE SERPL-MCNC: 93 MG/DL
HCT VFR BLD CALC: 37 %
HGB BLD-MCNC: 12.4 G/DL
IMM GRANULOCYTES NFR BLD AUTO: 0.3 %
LYMPHOCYTES # BLD AUTO: 1.56 K/UL
LYMPHOCYTES NFR BLD AUTO: 25.6 %
MAN DIFF?: NORMAL
MCHC RBC-ENTMCNC: 30.1 PG
MCHC RBC-ENTMCNC: 33.5 G/DL
MCV RBC AUTO: 89.8 FL
MONOCYTES # BLD AUTO: 0.52 K/UL
MONOCYTES NFR BLD AUTO: 8.5 %
NEUTROPHILS # BLD AUTO: 3.84 K/UL
NEUTROPHILS NFR BLD AUTO: 63 %
PLATELET # BLD AUTO: 252 K/UL
POTASSIUM SERPL-SCNC: 4.1 MMOL/L
PROT SERPL-MCNC: 7.5 G/DL
RBC # BLD: 4.12 M/UL
RBC # FLD: 13.2 %
SODIUM SERPL-SCNC: 138 MMOL/L
TRANSFERRIN SERPL-MCNC: 287 MG/DL
WBC # FLD AUTO: 6.1 K/UL

## 2020-09-04 ENCOUNTER — OUTPATIENT (OUTPATIENT)
Dept: OUTPATIENT SERVICES | Facility: HOSPITAL | Age: 35
LOS: 1 days | Discharge: HOME | End: 2020-09-04

## 2020-09-04 ENCOUNTER — APPOINTMENT (OUTPATIENT)
Dept: INTERNAL MEDICINE | Facility: CLINIC | Age: 35
End: 2020-09-04
Payer: MEDICAID

## 2020-09-04 VITALS
DIASTOLIC BLOOD PRESSURE: 75 MMHG | RESPIRATION RATE: 16 BRPM | BODY MASS INDEX: 28 KG/M2 | HEART RATE: 63 BPM | WEIGHT: 164 LBS | HEIGHT: 64 IN | SYSTOLIC BLOOD PRESSURE: 121 MMHG

## 2020-09-04 DIAGNOSIS — Z85.07 PERSONAL HISTORY OF MALIGNANT NEOPLASM OF PANCREAS: Chronic | ICD-10-CM

## 2020-09-04 DIAGNOSIS — Z23 ENCOUNTER FOR IMMUNIZATION: ICD-10-CM

## 2020-09-04 PROCEDURE — 99212 OFFICE O/P EST SF 10 MIN: CPT | Mod: GC

## 2020-09-04 RX ORDER — CIPROFLOXACIN HYDROCHLORIDE 500 MG/1
500 TABLET, FILM COATED ORAL
Qty: 10 | Refills: 0 | Status: DISCONTINUED | COMMUNITY
Start: 2020-02-10 | End: 2020-09-04

## 2020-09-04 RX ORDER — CYCLOBENZAPRINE HYDROCHLORIDE 10 MG/1
10 TABLET, FILM COATED ORAL
Qty: 30 | Refills: 0 | Status: DISCONTINUED | COMMUNITY
Start: 2020-01-27 | End: 2020-09-04

## 2020-09-04 RX ORDER — MELOXICAM 7.5 MG/1
7.5 TABLET ORAL DAILY
Qty: 30 | Refills: 0 | Status: DISCONTINUED | COMMUNITY
Start: 2020-01-27 | End: 2020-09-04

## 2020-09-04 RX ORDER — MULTIVITAMIN
TABLET ORAL DAILY
Qty: 90 | Refills: 1 | Status: ACTIVE | COMMUNITY
Start: 2020-09-04 | End: 1900-01-01

## 2020-09-04 NOTE — ASSESSMENT
[FreeTextEntry1] : 35 F with PMHx of solid pseudo-papillary neoplasm s/p Whipple, iron deficiency anemia, transaminitis presents for followup.\par \par #Solid pseudo-papillary neoplasm s/p Whipple\par - Follows with Hem/Onc\par - Last Imaging was in Sep 2019. Repeat CT scan A/P scheduled for next week by hem/Onc\par \par #Iron deficiency anemia\par - Completed venofer x5 with hematology in Feb 2020\par - Hb 12.4; Ferritin 70; \par - F/u with hem/Onc\par \par HCM\par - Last cervical pap smear was 2018 - neg\par - flue vaccine given\par

## 2020-09-04 NOTE — HISTORY OF PRESENT ILLNESS
[FreeTextEntry1] : Follow up\par Results of blood work [de-identified] : 35 year old female here today for follow up visit. History of EDWIN and pancreatic cancer s/p Kelvin (2018). \par She denies any GI symptoms, nausea, vomiting, or hematochezia. \par She received Venofer 200mg IV x5 doses; last dose was Feb 20, 2020. Recent labs show Hb of 12.4.\par Last mammogram was May 2019 which showed no evidence for malignancy

## 2020-09-04 NOTE — PHYSICAL EXAM
[Well Nourished] : well nourished [No Acute Distress] : no acute distress [Well Developed] : well developed [Normal Sclera/Conjunctiva] : normal sclera/conjunctiva [Well-Appearing] : well-appearing [PERRL] : pupils equal round and reactive to light [EOMI] : extraocular movements intact [Normal Outer Ear/Nose] : the outer ears and nose were normal in appearance [Normal Oropharynx] : the oropharynx was normal [No JVD] : no jugular venous distention [No Lymphadenopathy] : no lymphadenopathy [Supple] : supple [No Respiratory Distress] : no respiratory distress  [No Accessory Muscle Use] : no accessory muscle use [Thyroid Normal, No Nodules] : the thyroid was normal and there were no nodules present [Clear to Auscultation] : lungs were clear to auscultation bilaterally [Normal Rate] : normal rate  [Normal S1, S2] : normal S1 and S2 [No Murmur] : no murmur heard [Regular Rhythm] : with a regular rhythm [No Abdominal Bruit] : a ~M bruit was not heard ~T in the abdomen [No Varicosities] : no varicosities [No Carotid Bruits] : no carotid bruits [No Edema] : there was no peripheral edema [Pedal Pulses Present] : the pedal pulses are present [No Palpable Aorta] : no palpable aorta [No Extremity Clubbing/Cyanosis] : no extremity clubbing/cyanosis [Soft] : abdomen soft [Non-distended] : non-distended [No Masses] : no abdominal mass palpated [Non Tender] : non-tender [Normal Bowel Sounds] : normal bowel sounds [No HSM] : no HSM [Normal Anterior Cervical Nodes] : no anterior cervical lymphadenopathy [No CVA Tenderness] : no CVA  tenderness [Normal Posterior Cervical Nodes] : no posterior cervical lymphadenopathy [No Spinal Tenderness] : no spinal tenderness [No Joint Swelling] : no joint swelling [Grossly Normal Strength/Tone] : grossly normal strength/tone [No Rash] : no rash [No Focal Deficits] : no focal deficits [Coordination Grossly Intact] : coordination grossly intact [Normal Insight/Judgement] : insight and judgment were intact [Normal Gait] : normal gait [Normal Affect] : the affect was normal

## 2020-09-08 ENCOUNTER — RESULT REVIEW (OUTPATIENT)
Age: 35
End: 2020-09-08

## 2020-09-08 ENCOUNTER — OUTPATIENT (OUTPATIENT)
Dept: OUTPATIENT SERVICES | Facility: HOSPITAL | Age: 35
LOS: 1 days | Discharge: HOME | End: 2020-09-08
Payer: MEDICAID

## 2020-09-08 DIAGNOSIS — K86.89 OTHER SPECIFIED DISEASES OF PANCREAS: ICD-10-CM

## 2020-09-08 DIAGNOSIS — Z85.07 PERSONAL HISTORY OF MALIGNANT NEOPLASM OF PANCREAS: Chronic | ICD-10-CM

## 2020-09-08 DIAGNOSIS — D64.9 ANEMIA, UNSPECIFIED: ICD-10-CM

## 2020-09-08 DIAGNOSIS — Z23 ENCOUNTER FOR IMMUNIZATION: ICD-10-CM

## 2020-09-08 DIAGNOSIS — E55.9 VITAMIN D DEFICIENCY, UNSPECIFIED: ICD-10-CM

## 2020-09-08 DIAGNOSIS — C80.1 MALIGNANT (PRIMARY) NEOPLASM, UNSPECIFIED: ICD-10-CM

## 2020-09-08 DIAGNOSIS — R10.9 UNSPECIFIED ABDOMINAL PAIN: ICD-10-CM

## 2020-09-08 DIAGNOSIS — Z00.00 ENCOUNTER FOR GENERAL ADULT MEDICAL EXAMINATION WITHOUT ABNORMAL FINDINGS: ICD-10-CM

## 2020-09-08 PROCEDURE — 74177 CT ABD & PELVIS W/CONTRAST: CPT | Mod: 26

## 2020-09-08 PROCEDURE — 71260 CT THORAX DX C+: CPT | Mod: 26

## 2020-09-17 ENCOUNTER — LABORATORY RESULT (OUTPATIENT)
Age: 35
End: 2020-09-17

## 2020-09-17 ENCOUNTER — APPOINTMENT (OUTPATIENT)
Dept: HEMATOLOGY ONCOLOGY | Facility: CLINIC | Age: 35
End: 2020-09-17
Payer: MEDICAID

## 2020-09-17 ENCOUNTER — OUTPATIENT (OUTPATIENT)
Dept: OUTPATIENT SERVICES | Facility: HOSPITAL | Age: 35
LOS: 1 days | Discharge: HOME | End: 2020-09-17

## 2020-09-17 VITALS
HEART RATE: 66 BPM | SYSTOLIC BLOOD PRESSURE: 113 MMHG | HEIGHT: 64 IN | DIASTOLIC BLOOD PRESSURE: 71 MMHG | RESPIRATION RATE: 16 BRPM | TEMPERATURE: 98.2 F | WEIGHT: 168 LBS | BODY MASS INDEX: 28.68 KG/M2

## 2020-09-17 DIAGNOSIS — Z85.07 PERSONAL HISTORY OF MALIGNANT NEOPLASM OF PANCREAS: Chronic | ICD-10-CM

## 2020-09-17 PROCEDURE — 99213 OFFICE O/P EST LOW 20 MIN: CPT

## 2020-09-18 ENCOUNTER — APPOINTMENT (OUTPATIENT)
Dept: SURGERY | Facility: CLINIC | Age: 35
End: 2020-09-18
Payer: MEDICAID

## 2020-09-18 VITALS
TEMPERATURE: 97.2 F | HEART RATE: 86 BPM | HEIGHT: 64 IN | WEIGHT: 164 LBS | DIASTOLIC BLOOD PRESSURE: 74 MMHG | SYSTOLIC BLOOD PRESSURE: 102 MMHG | BODY MASS INDEX: 28 KG/M2

## 2020-09-18 LAB
HCT VFR BLD CALC: 34.6 %
HGB BLD-MCNC: 11.8 G/DL
MCHC RBC-ENTMCNC: 30.1 PG
MCHC RBC-ENTMCNC: 34.1 G/DL
MCV RBC AUTO: 88.3 FL
PLATELET # BLD AUTO: 218 K/UL
PMV BLD: 11 FL
RBC # BLD: 3.92 M/UL
RBC # FLD: 13.2 %
WBC # FLD AUTO: 7.09 K/UL

## 2020-09-18 PROCEDURE — 99213 OFFICE O/P EST LOW 20 MIN: CPT

## 2020-09-29 NOTE — PHYSICAL EXAM
[Normal Thyroid] : the thyroid was normal [Respiratory Effort] : normal respiratory effort [Normal Rate and Rhythm] : normal rate and rhythm [Purpura] : no purpura  [Alert] : alert [Calm] : calm [de-identified] : Normal [de-identified] : Normal [de-identified] : Normal [de-identified] : well healed scars. no hernia [de-identified] : Normal

## 2020-09-29 NOTE — HISTORY OF PRESENT ILLNESS
[de-identified] : 34 yo female patient without significant medical history comes today for followup visit. S/p pancreaticoduodenctomy, pylorus-preserving, complicated with a sepsis - possibly from aspiration pneumonia. She was discharged with one surgical drain. \par She had seen my partner post op on March 5th. At that time she had a well controlled, type A pancreatic fistula and early resolution of delayed gastric emptying. She was draining 30 ml a day of cloudy fluid from her left DIMITRY. She was tolerating regular diet with occasional nausea and vomiting. She was passing gas and having normal BMs. She was here today with her family for followup visit. \par She was admitted with some pain. She underwent a CT scan , blood work and culture. She possibly had a combination of post op UTI or yana pancreatic fluid. She was later discharged home on Levofloxacin and then Bactrim was added. \par 3/21/18: he is doing very well. She has a good appetite and is eating more. She ambulating and wants to go back to work. \par Her incisions have healed very well. Minimal scabs are there. \par The right DIMITRY drain is dry. The left DIMITRY drain still has some cloudy fluids the last 5 readings have been 10 , 20 , 15, 10 , 10 ml. So it is decreasing. \par 3/30/18: She again was doing very well. She had a good appetite and is eating more. She ambulating and wanted to go back to work. \par Her incisions had healed very well. The right DIMITRY drain is dry. The left DIMITRY drain still has some cloudy fluids the last 6 readings have been 10,9,9,8,7,5 So it is still decreasing. \par 4/4/18:She was doing very well. She has a good appetite and is eating more. She ambulating and wants to go back to work. \par Her incisions have healed very well. The right DIMITRY drain site was dry. The left DIMITRY drain still has some cloudy fluids the last 5 readings have been 5 ml each. So it is still decreasing. \par She is eating well and having regular bowel movements that are brown and sink. She was asked to follow up in 2 weeks for drain removal. \par She has seen Dr. Murray and is going to get a CT scan in 3 months. \par 5/4 Her drain output has only been 10 ml. She has been asymptomatic. We thus removed the drain. \par May : She is doing very well. She is very hungry and eating a lot . She is gaining weight. \par 6 month follow up : She is doing very well. She is very hungry and eating a lot . She is gaining weight. \par She has some stretching in the anterior abdominal wall when sneezing and coughing. \par We reviewed her latest labs and CT that were done by Dr. Murray\par 1 year follow up : She is doing well, She has regained normal life. She is at her pre op weight.  She has craving for sweet stuff.\par 3/6/2020: \par \par 1 1/2  year follow up : She was doing well, She has regained normal life. She is at her pre op weight.  \par She had been to [facility] where she ate a lot of fried food.  Since coming back she has epigastric discomfort. Which is worse on breathing deep. \par She denies nausea / vomiting.  Her stools are brown , well formed. She denies any blood in the stool \par 8/2019: With PPI things have not improved. She has dysuria.  She has epigastric pain worse on deep breaths.  LFts and CBC are normal \par SHe had a needle stick injury at work. - HIV and HEPC tests are negative.  Will order amylase lipase and stool elastase.\par 9/6: She is getting better. PPI helped her. Her pain with respiration has improved.\par 3/6/2020 : She is completely asymptomatic. She is doing well. She has questions about the reason for anemia. She had questions if she needed the EGD / Colonoscopy. \par \par Sept 2020: She had a annual CT : Which is largely normal .CT chest showed some 2 mm pleural nodule. \par She is otherwise doing well.

## 2020-09-29 NOTE — ASSESSMENT
[FreeTextEntry1] : 34 yo female patient without significant medical history comes today for followup visit. S/p pancreaticoduodenctomy, pylorus-preserving, complicated with a sepsis - possibly from aspiration pneumonia. She was discharged with one surgical drain. \par She had seen my partner post op on March 5th. At that time she had a well controlled, type A pancreatic fistula and early resolution of delayed gastric emptying. She was draining 30 ml a day of cloudy fluid from her left DIMITRY. She was tolerating regular diet with occasional nausea and vomiting. She was passing gas and having normal BMs. She was here today with her family for followup visit. \par She was admitted with some pain. She underwent a CT scan , blood work and culture. She possibly had a combination of post op UTI or yana pancreatic fluid. She was later discharged home on Levofloxacin and then Bactrim was added. \par 3/21/18: he is doing very well. She has a good appetite and is eating more. She ambulating and wants to go back to work. \par Her incisions have healed very well. Minimal scabs are there. \par The right DIMITRY drain is dry. The left DIMITRY drain still has some cloudy fluids the last 5 readings have been 10 , 20 , 15, 10 , 10 ml. So it is decreasing. \par 3/30/18: She again was doing very well. She had a good appetite and is eating more. She ambulating and wanted to go back to work. \par Her incisions had healed very well. The right DIMITRY drain is dry. The left DIMITRY drain still has some cloudy fluids the last 6 readings have been 10,9,9,8,7,5 So it is still decreasing. \par 4/4/18:She was doing very well. She has a good appetite and is eating more. She ambulating and wants to go back to work. \par Her incisions have healed very well. The right DIMITRY drain site was dry. The left DIMITRY drain still has some cloudy fluids the last 5 readings have been 5 ml each. So it is still decreasing. \par She is eating well and having regular bowel movements that are brown and sink. She was asked to follow up in 2 weeks for drain removal. \par She has seen Dr. Murray and is going to get a CT scan in 3 months. \par 5/4 Her drain output has only been 10 ml. She has been asymptomatic. We thus removed the drain. \par May : She is doing very well. She is very hungry and eating a lot . She is gaining weight. \par 6 month follow up : She is doing very well. She is very hungry and eating a lot . She is gaining weight. \par She has some stretching in the anterior abdominal wall when sneezing and coughing. \par We reviewed her latest labs and CT that were done by Dr. Murray\par 1 year follow up : She is doing well, She has regained normal life. She is at her pre op weight.  She has craving for sweet stuff.\par 3/6/2020: \par \par 1 1/2  year follow up : She was doing well, She has regained normal life. She is at her pre op weight.  \par She had been to [facility] where she ate a lot of fried food.  Since coming back she has epigastric discomfort. Which is worse on breathing deep. \par She denies nausea / vomiting.  Her stools are brown , well formed. She denies any blood in the stool \par 8/2019: With PPI things have not improved. She has dysuria.  She has epigastric pain worse on deep breaths.  LFts and CBC are normal \par SHe had a needle stick injury at work. - HIV and HEPC tests are negative.  Will order amylase lipase and stool elastase.\par 9/6: She is getting better. PPI helped her. Her pain with respiration has improved.\par 3/6/2020 : She is completely asymptomatic. She is doing well. She has questions about the reason for anemia. She had questions if she needed the EGD / Colonoscopy. \par \par Sept 2020: She had a annual CT : Which is largely normal .CT chest showed some 2 mm pleural nodule. \par She is otherwise doing well.\par \par We explained in great detail the pathophysiology of the disease process. We discussed the workup for diagnosis and management.The Post operative care was explained to the patient. She was counselled on diet , exercise and wound care.\par \par CT scan is largely normal . \par \par \par We discussed the importance of close follow up. \par We informed that she needs to follow up in 1 year. \par We also informed that she can call us if anything changes or has any questions.\par \par

## 2020-09-30 DIAGNOSIS — D64.9 ANEMIA, UNSPECIFIED: ICD-10-CM

## 2020-09-30 DIAGNOSIS — K86.89 OTHER SPECIFIED DISEASES OF PANCREAS: ICD-10-CM

## 2020-09-30 NOTE — HISTORY OF PRESENT ILLNESS
[de-identified] : \par 34 yo female , Luxembourgish speaking, born in [place] , interviewed with  # [id number]. Patient with  with no significant  significant medical history . S/p pancreaticoduodenectomy, pylorus-preserving R0 resection of  for pancreatic mass diagnosed when she presented with epigastric and periumbilical swelling for 3 months.  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  surgery complicated with a sepsis - possibly from aspiration pneumonia. \par type A pancreatic fistula and early resolution of delayed gastric emptying. She continues to have drainage 10 to 20cc daily . She denies fever, nausea, vomiting , abdominal pain or diarrhea. \par \par \par \par  [de-identified] : 06/14/2018 patient returns for follow up , she feels well , no abdominal pain or weight loss. no nausea or vomiting . Blood work was notable for mild normocytic anemia , Ferritin 38 . She denies hematochezia or heavy menses. \par \par 08/21/2018 : Patient returns for follow up , she is on iron one daily , Hb improved to 12. She complains of slight increase in epigastric discomfort , no nausea, vomiting , pruritis, change in bowel habits. no weight loss. \par \par 06/03/2019 Patient returns for history of pancreatic cancer s/p Whipple's with no evidence of disease. Iron deficiency anemia s/p venofer .She feels well and denies any GI symptoms . no weight loss, nausea vomiting . Recent blood work shows slightly elevated alkaline phosphatase .\par \par 01/27/2020 Patient returns with chief complaint of interscapular pain for 2 to 3 weeks , worse with activity and present at night . 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 She reports urinary frequency without dysuria , change in bowel habits , leg weakness or numbness. \par \par 02/10/2020\par Patient returns for history of pancreatic cancer s/p Whipple's with no evidence of disease. Ferritin of 8 noted on 01/27/2020. On venofer. Her interscapular pain is better with cyclobenzaprine. X ray did not reveal any fractures. She complains of increased urinary frequency. No fevers. \par Her menses are regular and normal. \par \par 6/11/2020: SHIELA VERMA is a 35 year old female here today for follow up visit for history of EDWIN and pancreatic cancer s/p Whipples. She feels well besdies mild fatigue. She denies any GI symptoms,  weight loss, nausea, vomiting, or hematochezia. she received Venofer 200mg IV x5 doses; last dose was Feb 20, 2020. She complains of urticaria around her areolas which started about a week ago. Last mammogram was May 2019 which showed no evidence for malignancy. CBC reviewed normocytic anemia noted. Will recheck iron studies and ferritin on 6/11/2020\par \par 9/17/2020: SHIELA VERMA is a 35 year old female here today for follow up visit for history of EDWIN and pancreatic cancer s/p Whipples. She feels well besides mild fatigue. She denies any GI symptoms,  weight loss, nausea, vomiting, or hematochezia. she received Venofer 200mg IV x5 doses; last dose was Feb 20, 2020. CT C/A/P was completed on 9/8/2020 CT A/P was negative for disease; post surgical changes from Whipple procedure. CT of chest showed : \par IMPRESSION:\par Since January 23, 2018\par Nonspecific 2 mm subpleural nodule in the right lower lobe which may represent a focus of scarring/atelectasis\par Scattered 2 mm nodules in the right upper lobe which may be postinflammatory in etiology.\par Patient denies cough at this time. She states that she does have mild SOB on exertion. \par \par

## 2020-09-30 NOTE — ASSESSMENT
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 Will review with radiologist \par -- Will have short term follow up\par \par # iron deficiency anemia : \par - S/p venofer x 5 doses in 02/2020\par - Continues to be anemia Hgb 11.8 Ferritin 70 ng/mL\par \par RTC in 3 months or sooner if SOB symptoms progress\par Patient seen and examined with Dr. Murray

## 2020-11-05 ENCOUNTER — APPOINTMENT (OUTPATIENT)
Dept: HEMATOLOGY ONCOLOGY | Facility: CLINIC | Age: 35
End: 2020-11-05

## 2020-12-04 ENCOUNTER — APPOINTMENT (OUTPATIENT)
Dept: INTERNAL MEDICINE | Facility: CLINIC | Age: 35
End: 2020-12-04
Payer: MEDICAID

## 2020-12-04 ENCOUNTER — OUTPATIENT (OUTPATIENT)
Dept: OUTPATIENT SERVICES | Facility: HOSPITAL | Age: 35
LOS: 1 days | Discharge: HOME | End: 2020-12-04

## 2020-12-04 VITALS
WEIGHT: 168 LBS | HEIGHT: 64 IN | OXYGEN SATURATION: 98 % | TEMPERATURE: 98.3 F | BODY MASS INDEX: 28.68 KG/M2 | HEART RATE: 67 BPM | DIASTOLIC BLOOD PRESSURE: 67 MMHG | SYSTOLIC BLOOD PRESSURE: 96 MMHG

## 2020-12-04 DIAGNOSIS — Z85.07 PERSONAL HISTORY OF MALIGNANT NEOPLASM OF PANCREAS: Chronic | ICD-10-CM

## 2020-12-04 DIAGNOSIS — D64.9 ANEMIA, UNSPECIFIED: ICD-10-CM

## 2020-12-04 DIAGNOSIS — C80.1 MALIGNANT (PRIMARY) NEOPLASM, UNSPECIFIED: ICD-10-CM

## 2020-12-04 LAB
25(OH)D3 SERPL-MCNC: 20 NG/ML
ALBUMIN SERPL ELPH-MCNC: 4.3 G/DL
ALP BLD-CCNC: 105 U/L
ALT SERPL-CCNC: 22 U/L
ANION GAP SERPL CALC-SCNC: 11 MMOL/L
AST SERPL-CCNC: 18 U/L
BASOPHILS # BLD AUTO: 0.03 K/UL
BASOPHILS NFR BLD AUTO: 0.5 %
BILIRUB SERPL-MCNC: 0.4 MG/DL
BUN SERPL-MCNC: 17 MG/DL
CALCIUM SERPL-MCNC: 8.9 MG/DL
CHLORIDE SERPL-SCNC: 101 MMOL/L
CHOLEST SERPL-MCNC: 141 MG/DL
CO2 SERPL-SCNC: 23 MMOL/L
CREAT SERPL-MCNC: 0.5 MG/DL
EOSINOPHIL # BLD AUTO: 0.23 K/UL
EOSINOPHIL NFR BLD AUTO: 3.7 %
ESTIMATED AVERAGE GLUCOSE: 117 MG/DL
FERRITIN SERPL-MCNC: 25 NG/ML
GLUCOSE SERPL-MCNC: 93 MG/DL
HBA1C MFR BLD HPLC: 5.7 %
HCT VFR BLD CALC: 37.1 %
HDLC SERPL-MCNC: 55 MG/DL
HGB BLD-MCNC: 12 G/DL
IMM GRANULOCYTES NFR BLD AUTO: 0.3 %
IRON SATN MFR SERPL: 16 %
IRON SERPL-MCNC: 64 UG/DL
LDLC SERPL CALC-MCNC: 82 MG/DL
LYMPHOCYTES # BLD AUTO: 1.92 K/UL
LYMPHOCYTES NFR BLD AUTO: 30.9 %
MAN DIFF?: NORMAL
MCHC RBC-ENTMCNC: 28.8 PG
MCHC RBC-ENTMCNC: 32.3 G/DL
MCV RBC AUTO: 89.2 FL
MONOCYTES # BLD AUTO: 0.55 K/UL
MONOCYTES NFR BLD AUTO: 8.9 %
NEUTROPHILS # BLD AUTO: 3.46 K/UL
NEUTROPHILS NFR BLD AUTO: 55.7 %
NONHDLC SERPL-MCNC: 86 MG/DL
PLATELET # BLD AUTO: 291 K/UL
POTASSIUM SERPL-SCNC: 4.8 MMOL/L
PROT SERPL-MCNC: 6.9 G/DL
RBC # BLD: 4.16 M/UL
RBC # FLD: 13 %
SODIUM SERPL-SCNC: 135 MMOL/L
TIBC SERPL-MCNC: 395 UG/DL
TRANSFERRIN SERPL-MCNC: 317 MG/DL
TRIGL SERPL-MCNC: 58 MG/DL
TSH SERPL-ACNC: 1.89 UIU/ML
UIBC SERPL-MCNC: 331 UG/DL
WBC # FLD AUTO: 6.21 K/UL

## 2020-12-04 PROCEDURE — 99212 OFFICE O/P EST SF 10 MIN: CPT | Mod: GC

## 2020-12-04 RX ORDER — CHOLECALCIFEROL (VITAMIN D3) 1250 MCG
1.25 MG CAPSULE ORAL WEEKLY
Qty: 15 | Refills: 0 | Status: ACTIVE | COMMUNITY
Start: 2020-09-04 | End: 1900-01-01

## 2020-12-09 NOTE — ASSESSMENT
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  CT abdomen no evidence of suspicious soft tissue lesion or fluid collection in the postsurgical bed\par Mildly elevated ALT and alphos, now essentially normal, followed by oncology. 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

## 2020-12-21 ENCOUNTER — OUTPATIENT (OUTPATIENT)
Dept: OUTPATIENT SERVICES | Facility: HOSPITAL | Age: 35
LOS: 1 days | Discharge: HOME | End: 2020-12-21

## 2020-12-21 ENCOUNTER — APPOINTMENT (OUTPATIENT)
Dept: OBGYN | Facility: CLINIC | Age: 35
End: 2020-12-21
Payer: MEDICAID

## 2020-12-21 VITALS
HEIGHT: 64 IN | BODY MASS INDEX: 29.02 KG/M2 | DIASTOLIC BLOOD PRESSURE: 70 MMHG | WEIGHT: 170 LBS | SYSTOLIC BLOOD PRESSURE: 102 MMHG

## 2020-12-21 DIAGNOSIS — Z85.07 PERSONAL HISTORY OF MALIGNANT NEOPLASM OF PANCREAS: Chronic | ICD-10-CM

## 2020-12-21 PROBLEM — N76.0 BACTERIAL VAGINOSIS: Status: RESOLVED | Noted: 2019-04-12 | Resolved: 2020-12-21

## 2020-12-21 PROBLEM — Z87.440 HISTORY OF URINARY TRACT INFECTION: Status: RESOLVED | Noted: 2019-03-08 | Resolved: 2020-12-21

## 2020-12-21 PROCEDURE — 99395 PREV VISIT EST AGE 18-39: CPT

## 2020-12-21 NOTE — HISTORY OF PRESENT ILLNESS
[FreeTextEntry1] : 35 p1 for annual and pap.\par h/o abnormal pap.\par Still thinking about getting pregnant but she is nervous given her history of the whipple.\par regular periods.\par

## 2020-12-21 NOTE — DISCUSSION/SUMMARY
[FreeTextEntry1] : Normal Annual\par Patient counselled that she can get pregnant\par She inquired about mammograms - I told her she should start at age 41. She is not an increased risk after getting a detailed family history.

## 2020-12-22 NOTE — PHYSICAL EXAM
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 no palpable masses.  [de-identified] : no focal deficits .

## 2020-12-23 LAB — HPV HIGH+LOW RISK DNA PNL CVX: NOT DETECTED

## 2021-01-05 ENCOUNTER — APPOINTMENT (OUTPATIENT)
Dept: DERMATOLOGY | Facility: CLINIC | Age: 36
End: 2021-01-05

## 2021-01-14 ENCOUNTER — APPOINTMENT (OUTPATIENT)
Dept: HEMATOLOGY ONCOLOGY | Facility: CLINIC | Age: 36
End: 2021-01-14

## 2021-01-15 DIAGNOSIS — R91.8 OTHER NONSPECIFIC ABNORMAL FINDING OF LUNG FIELD: ICD-10-CM

## 2021-01-26 ENCOUNTER — OUTPATIENT (OUTPATIENT)
Dept: OUTPATIENT SERVICES | Facility: HOSPITAL | Age: 36
LOS: 1 days | Discharge: HOME | End: 2021-01-26
Payer: MEDICAID

## 2021-01-26 ENCOUNTER — RESULT REVIEW (OUTPATIENT)
Age: 36
End: 2021-01-26

## 2021-01-26 DIAGNOSIS — Z85.07 PERSONAL HISTORY OF MALIGNANT NEOPLASM OF PANCREAS: Chronic | ICD-10-CM

## 2021-01-26 DIAGNOSIS — K86.89 OTHER SPECIFIED DISEASES OF PANCREAS: ICD-10-CM

## 2021-01-26 DIAGNOSIS — R91.8 OTHER NONSPECIFIC ABNORMAL FINDING OF LUNG FIELD: ICD-10-CM

## 2021-01-26 PROCEDURE — 71250 CT THORAX DX C-: CPT | Mod: 26

## 2021-02-02 ENCOUNTER — OUTPATIENT (OUTPATIENT)
Dept: OUTPATIENT SERVICES | Facility: HOSPITAL | Age: 36
LOS: 1 days | Discharge: HOME | End: 2021-02-02

## 2021-02-02 ENCOUNTER — APPOINTMENT (OUTPATIENT)
Dept: HEMATOLOGY ONCOLOGY | Facility: CLINIC | Age: 36
End: 2021-02-02
Payer: MEDICAID

## 2021-02-02 DIAGNOSIS — Z85.07 PERSONAL HISTORY OF MALIGNANT NEOPLASM OF PANCREAS: Chronic | ICD-10-CM

## 2021-02-02 PROCEDURE — 99214 OFFICE O/P EST MOD 30 MIN: CPT

## 2021-02-03 NOTE — REVIEW OF SYSTEMS
[Fatigue] : fatigue [Negative] : Heme/Lymph [Muscle Pain] : muscle pain [Shortness Of Breath] : no shortness of breath [Cough] : no cough [FreeTextEntry9] : pain posterior neck and upper thoracic spine

## 2021-02-03 NOTE — ASSESSMENT
[FreeTextEntry1] : 35 yof with solid pseudo-papillary neoplasm of head of pancreas pT2N0, s/p R0 resection.\par History of iron deficiency anemia, s/p venofer, completing in 2/2020.\par \par - CT abdomen/pelvis in 9/8/2020 TARYN. CT chest from 1/27/2021 revealed 2 mm subpleural RLL nodule, stable since 2018. Previously described RUL nodules not visualized.\par - Patient is 3 years s/p surgery, so will plan for surveillance scans on yearly basis.\par - Spoke with patient at length using  [id number] and explained CT findings. Explained that neck and upper back pain is not consistent with CT findings and likely musculoskeletal in origin (Patient cleans bank at night.) Will order bone scan for completion. Patient advised to take NSAIDs PRN.\par - Labs results from 12/2020 reviewed. Will recheck labs at patient's next visit in 3 months.\par - Patient will follow up in 3 months. She knows to call in the event that she needs to be seen sooner.\par - All questions answered.

## 2021-02-03 NOTE — HISTORY OF PRESENT ILLNESS
[de-identified] : \par 32 yo female , Kiswahili speaking, born in [place] , interviewed with  # [id number]. Patient with  with no significant  significant medical history . S/p pancreaticoduodenectomy, pylorus-preserving R0 resection of  for pancreatic mass diagnosed when she presented with epigastric and periumbilical swelling for 3 months.  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  surgery complicated with a sepsis - possibly from aspiration pneumonia. \par type A pancreatic fistula and early resolution of delayed gastric emptying. She continues to have drainage 10 to 20cc daily . She denies fever, nausea, vomiting , abdominal pain or diarrhea. \par \par \par \par  [de-identified] : 06/14/2018 patient returns for follow up , she feels well , no abdominal pain or weight loss. no nausea or vomiting . Blood work was notable for mild normocytic anemia , Ferritin 38 . She denies hematochezia or heavy menses. \par \par 08/21/2018 : Patient returns for follow up , she is on iron one daily , Hb improved to 12. She complains of slight increase in epigastric discomfort , no nausea, vomiting , pruritis, change in bowel habits. no weight loss. \par \par 06/03/2019 Patient returns for history of pancreatic cancer s/p Whipple's with no evidence of disease. Iron deficiency anemia s/p venofer .She feels well and denies any GI symptoms . no weight loss, nausea vomiting . Recent blood work shows slightly elevated alkaline phosphatase .\par \par 01/27/2020 Patient returns with chief complaint of interscapular pain for 2 to 3 weeks , worse with activity and present at night . 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 She reports urinary frequency without dysuria , change in bowel habits , leg weakness or numbness. \par \par 02/10/2020\par Patient returns for history of pancreatic cancer s/p Whipple's with no evidence of disease. Ferritin of 8 noted on 01/27/2020. On venofer. Her interscapular pain is better with cyclobenzaprine. X ray did not reveal any fractures. She complains of increased urinary frequency. No fevers. \par Her menses are regular and normal. \par \par 6/11/2020: SHIELA VERMA is a 35 year old female here today for follow up visit for history of EDWIN and pancreatic cancer s/p Whipples. She feels well besdies mild fatigue. She denies any GI symptoms,  weight loss, nausea, vomiting, or hematochezia. she received Venofer 200mg IV x5 doses; last dose was Feb 20, 2020. She complains of urticaria around her areolas which started about a week ago. Last mammogram was May 2019 which showed no evidence for malignancy. CBC reviewed normocytic anemia noted. Will recheck iron studies and ferritin on 6/11/2020\par \par 9/17/2020: SHIELA VERMA is a 35 year old female here today for follow up visit for history of EDWIN and pancreatic cancer s/p Whipples. She feels well besides mild fatigue. She denies any GI symptoms,  weight loss, nausea, vomiting, or hematochezia. she received Venofer 200mg IV x5 doses; last dose was Feb 20, 2020. CT C/A/P was completed on 9/8/2020 CT A/P was negative for disease; post surgical changes from Whipple procedure. CT of chest showed : \par IMPRESSION:\par Since January 23, 2018\par Nonspecific 2 mm subpleural nodule in the right lower lobe which may represent a focus of scarring/atelectasis\par Scattered 2 mm nodules in the right upper lobe which may be postinflammatory in etiology.\par Patient denies cough at this time. She states that she does have mild SOB on exertion. \par \par 2/2/2021: Patient presents for follow up. Complains of difficulty breathing for several weeks. Upon further clarification, denies overt shortness of breath or cough. Has nasal congestion. Also complains of posterior neck and paraspinal upper back pain and sensation of warmth for about the same length of time. Denies nausea, vomiting, anorexia. She is asking to have her blood levels checked because of her history of iron deficiency (last received venofer in 2/2020). Her PMD checked labs in 12/2020, and she had hemoglobin of 12 with 16% saturation and ferritin of 25. Patient does not take oral iron.\par \par

## 2021-02-03 NOTE — PHYSICAL EXAM
[Normal] : affect appropriate [de-identified] : healed scar of surgery , no tenderness or redness. no palpable masses.  [de-identified] : Paraspinal pain posterior neck and upper back, not significantly changed with palpation. No midline tenderness.

## 2021-02-11 NOTE — ASSESSMENT
[FreeTextEntry1] : 35 F with PMHx of solid pseudo-papillary neoplasm s/p Whipple, iron deficiency anemia, transaminitis presents for followup.\par \par #Solid pseudo-papillary neoplasm s/p Whipple\par - Follows with Hem/Onc\par - Last Imaging was in Sep 2020. no changes\par \par # Ct chest showing 2 mm nodules in right upper and lower lobe\par - FU with Oncologist\par \par #Iron deficiency anemia\par - Completed venofer x5 with hematology in Feb 2020\par - Hb 12  Ferritin normal \par \par \par HCM\par - Last cervical pap smear was 2018 - neg\par - flue vaccine given\par - mammography appointment Jan 2021\par -increase Vit D supplement

## 2021-02-11 NOTE — HISTORY OF PRESENT ILLNESS
[de-identified] : 35 year old female here today for follow up visit. History of EDWIN and pancreatic cancer s/p Kelvin (2018). \par She denies any GI symptoms, nausea, vomiting, or hematochezia. \par

## 2021-02-16 DIAGNOSIS — C80.1 MALIGNANT (PRIMARY) NEOPLASM, UNSPECIFIED: ICD-10-CM

## 2021-02-16 DIAGNOSIS — E61.1 IRON DEFICIENCY: ICD-10-CM

## 2021-02-16 DIAGNOSIS — K86.89 OTHER SPECIFIED DISEASES OF PANCREAS: ICD-10-CM

## 2021-03-10 ENCOUNTER — RESULT REVIEW (OUTPATIENT)
Age: 36
End: 2021-03-10

## 2021-03-10 ENCOUNTER — OUTPATIENT (OUTPATIENT)
Dept: OUTPATIENT SERVICES | Facility: HOSPITAL | Age: 36
LOS: 1 days | Discharge: HOME | End: 2021-03-10
Payer: MEDICAID

## 2021-03-10 DIAGNOSIS — Z85.07 PERSONAL HISTORY OF MALIGNANT NEOPLASM OF PANCREAS: Chronic | ICD-10-CM

## 2021-03-10 DIAGNOSIS — K86.89 OTHER SPECIFIED DISEASES OF PANCREAS: ICD-10-CM

## 2021-03-10 PROCEDURE — 78803 RP LOCLZJ TUM SPECT 1 AREA: CPT | Mod: 26

## 2021-03-10 PROCEDURE — 78306 BONE IMAGING WHOLE BODY: CPT | Mod: 26

## 2021-03-26 ENCOUNTER — APPOINTMENT (OUTPATIENT)
Dept: SURGERY | Facility: CLINIC | Age: 36
End: 2021-03-26
Payer: MEDICAID

## 2021-03-26 DIAGNOSIS — K46.9 UNSPECIFIED ABDOMINAL HERNIA W/OUT OBSTRUCTION OR GANGRENE: ICD-10-CM

## 2021-03-26 PROCEDURE — 99213 OFFICE O/P EST LOW 20 MIN: CPT

## 2021-03-26 PROCEDURE — 99072 ADDL SUPL MATRL&STAF TM PHE: CPT

## 2021-03-26 NOTE — PHYSICAL EXAM
[Normal Thyroid] : the thyroid was normal [Respiratory Effort] : normal respiratory effort [Normal Rate and Rhythm] : normal rate and rhythm [Alert] : alert [Calm] : calm [Purpura] : no purpura  [de-identified] : Normal [de-identified] : Normal [de-identified] : Normal [de-identified] : well healed scars. no hernia [de-identified] : Normal

## 2021-03-26 NOTE — ASSESSMENT
[FreeTextEntry1] : 37 yo female patient without significant medical history comes today for followup visit. S/p pancreaticoduodenctomy, pylorus-preserving, complicated with a sepsis - possibly from aspiration pneumonia. She was discharged with one surgical drain. \par She had seen my partner post op on March 5th. At that time she had a well controlled, type A pancreatic fistula and early resolution of delayed gastric emptying. She was draining 30 ml a day of cloudy fluid from her left DIMITRY. She was tolerating regular diet with occasional nausea and vomiting. She was passing gas and having normal BMs. She was here today with her family for followup visit. \par She was admitted with some pain. She underwent a CT scan , blood work and culture. She possibly had a combination of post op UTI or yana pancreatic fluid. She was later discharged home on Levofloxacin and then Bactrim was added. \par 3/21/18: he is doing very well. She has a good appetite and is eating more. She ambulating and wants to go back to work. \par Her incisions have healed very well. Minimal scabs are there. \par The right DIMITRY drain is dry. The left DIMITRY drain still has some cloudy fluids the last 5 readings have been 10 , 20 , 15, 10 , 10 ml. So it is decreasing. \par 3/30/18: She again was doing very well. She had a good appetite and is eating more. She ambulating and wanted to go back to work. \par Her incisions had healed very well. The right DIMITRY drain is dry. The left DIMITRY drain still has some cloudy fluids the last 6 readings have been 10,9,9,8,7,5 So it is still decreasing. \par 4/4/18:She was doing very well. She has a good appetite and is eating more. She ambulating and wants to go back to work. \par Her incisions have healed very well. The right DIMITRY drain site was dry. The left DIMITRY drain still has some cloudy fluids the last 5 readings have been 5 ml each. So it is still decreasing. \par She is eating well and having regular bowel movements that are brown and sink. She was asked to follow up in 2 weeks for drain removal. \par She has seen Dr. Murray and is going to get a CT scan in 3 months. \par 5/4 Her drain output has only been 10 ml. She has been asymptomatic. We thus removed the drain. \par May : She is doing very well. She is very hungry and eating a lot . She is gaining weight. \par 6 month follow up : She is doing very well. She is very hungry and eating a lot . She is gaining weight. \par She has some stretching in the anterior abdominal wall when sneezing and coughing. \par We reviewed her latest labs and CT that were done by Dr. Murray\par 1 year follow up : She is doing well, She has regained normal life. She is at her pre op weight.  She has craving for sweet stuff.\par 3/6/2020: \par \par 1 1/2  year follow up : She was doing well, She has regained normal life. She is at her pre op weight.  \par She had been to [facility] where she ate a lot of fried food.  Since coming back she has epigastric discomfort. Which is worse on breathing deep. \par She denies nausea / vomiting.  Her stools are brown , well formed. She denies any blood in the stool \par 8/2019: With PPI things have not improved. She has dysuria.  She has epigastric pain worse on deep breaths.  LFts and CBC are normal \par SHe had a needle stick injury at work. - HIV and HEPC tests are negative.  Will order amylase lipase and stool elastase.\par 9/6: She is getting better. PPI helped her. Her pain with respiration has improved.\par 3/6/2020 : She is completely asymptomatic. She is doing well. She has questions about the reason for anemia. She had questions if she needed the EGD / Colonoscopy. \par Sept 2020: She had a annual CT : Which is largely normal .CT chest showed some 2 mm pleural nodule. \par She is otherwise doing well.\par March 2021 : She is doing well. She had SOB which has resolved- She had a CT - in which things are stable. \par She wants a name of a gi doctor and sinus doctor. \par She had a bone scan - that’s normal \par \par \par We explained in great detail the pathophysiology of the disease process. We discussed the workup for diagnosis and management.The Post operative care was explained to the patient. She was counselled on diet , exercise and wound care.\par \par CT scan is largely normal . \par \par We discussed the importance of close follow up. \par We informed that she needs to follow up in 1 year. \par We also informed that she can call us if anything changes or has any questions.\par \par

## 2021-03-26 NOTE — HISTORY OF PRESENT ILLNESS
[de-identified] : 35 yo female patient without significant medical history comes today for followup visit. S/p pancreaticoduodenctomy, pylorus-preserving, complicated with a sepsis - possibly from aspiration pneumonia. She was discharged with one surgical drain. \par She had seen my partner post op on March 5th. At that time she had a well controlled, type A pancreatic fistula and early resolution of delayed gastric emptying. She was draining 30 ml a day of cloudy fluid from her left DIMITRY. She was tolerating regular diet with occasional nausea and vomiting. She was passing gas and having normal BMs. She was here today with her family for followup visit. \par She was admitted with some pain. She underwent a CT scan , blood work and culture. She possibly had a combination of post op UTI or yana pancreatic fluid. She was later discharged home on Levofloxacin and then Bactrim was added. \par 3/21/18: he is doing very well. She has a good appetite and is eating more. She ambulating and wants to go back to work. \par Her incisions have healed very well. Minimal scabs are there. \par The right DIMITRY drain is dry. The left DIMITRY drain still has some cloudy fluids the last 5 readings have been 10 , 20 , 15, 10 , 10 ml. So it is decreasing. \par 3/30/18: She again was doing very well. She had a good appetite and is eating more. She ambulating and wanted to go back to work. \par Her incisions had healed very well. The right DIMITRY drain is dry. The left DIMITRY drain still has some cloudy fluids the last 6 readings have been 10,9,9,8,7,5 So it is still decreasing. \par 4/4/18:She was doing very well. She has a good appetite and is eating more. She ambulating and wants to go back to work. \par Her incisions have healed very well. The right DIMITRY drain site was dry. The left DIMITRY drain still has some cloudy fluids the last 5 readings have been 5 ml each. So it is still decreasing. \par She is eating well and having regular bowel movements that are brown and sink. She was asked to follow up in 2 weeks for drain removal. \par She has seen Dr. Murray and is going to get a CT scan in 3 months. \par 5/4 Her drain output has only been 10 ml. She has been asymptomatic. We thus removed the drain. \par May : She is doing very well. She is very hungry and eating a lot . She is gaining weight. \par 6 month follow up : She is doing very well. She is very hungry and eating a lot . She is gaining weight. \par She has some stretching in the anterior abdominal wall when sneezing and coughing. \par We reviewed her latest labs and CT that were done by Dr. Murray\par 1 year follow up : She is doing well, She has regained normal life. She is at her pre op weight.  She has craving for sweet stuff.\par 3/6/2020: \par \par 1 1/2  year follow up : She was doing well, She has regained normal life. She is at her pre op weight.  \par She had been to [facility] where she ate a lot of fried food.  Since coming back she has epigastric discomfort. Which is worse on breathing deep. \par She denies nausea / vomiting.  Her stools are brown , well formed. She denies any blood in the stool \par 8/2019: With PPI things have not improved. She has dysuria.  She has epigastric pain worse on deep breaths.  LFts and CBC are normal \par SHe had a needle stick injury at work. - HIV and HEPC tests are negative.  Will order amylase lipase and stool elastase.\par 9/6: She is getting better. PPI helped her. Her pain with respiration has improved.\par 3/6/2020 : She is completely asymptomatic. She is doing well. She has questions about the reason for anemia. She had questions if she needed the EGD / Colonoscopy. \par Sept 2020: She had a annual CT : Which is largely normal .CT chest showed some 2 mm pleural nodule. \par She is otherwise doing well.\par March 2021 : She is doing well. She had SOB which has resolved- She had a CT - in which things are stable. \par She wants a name of a gi doctor and sinus doctor. \par She had a bone scan - that’s normal

## 2021-04-06 LAB
25(OH)D3 SERPL-MCNC: 14 NG/ML
ALBUMIN SERPL ELPH-MCNC: 4.4 G/DL
ALP BLD-CCNC: 99 U/L
ALT SERPL-CCNC: 22 U/L
ANION GAP SERPL CALC-SCNC: 12 MMOL/L
AST SERPL-CCNC: 18 U/L
BASOPHILS # BLD AUTO: 0.02 K/UL
BASOPHILS NFR BLD AUTO: 0.3 %
BILIRUB SERPL-MCNC: 0.6 MG/DL
BUN SERPL-MCNC: 16 MG/DL
CALCIUM SERPL-MCNC: 8.9 MG/DL
CHLORIDE SERPL-SCNC: 105 MMOL/L
CHOLEST SERPL-MCNC: 134 MG/DL
CO2 SERPL-SCNC: 20 MMOL/L
CREAT SERPL-MCNC: 0.6 MG/DL
EOSINOPHIL # BLD AUTO: 0.28 K/UL
EOSINOPHIL NFR BLD AUTO: 3.5 %
ESTIMATED AVERAGE GLUCOSE: 117 MG/DL
FERRITIN SERPL-MCNC: 19 NG/ML
GLUCOSE SERPL-MCNC: 101 MG/DL
HBA1C MFR BLD HPLC: 5.7 %
HCT VFR BLD CALC: 37.5 %
HDLC SERPL-MCNC: 45 MG/DL
HGB BLD-MCNC: 12.1 G/DL
IMM GRANULOCYTES NFR BLD AUTO: 0.3 %
IRON SERPL-MCNC: 135 UG/DL
LDLC SERPL CALC-MCNC: 77 MG/DL
LYMPHOCYTES # BLD AUTO: 2.68 K/UL
LYMPHOCYTES NFR BLD AUTO: 33.8 %
MAN DIFF?: NORMAL
MCHC RBC-ENTMCNC: 28.7 PG
MCHC RBC-ENTMCNC: 32.3 G/DL
MCV RBC AUTO: 89.1 FL
MONOCYTES # BLD AUTO: 0.62 K/UL
MONOCYTES NFR BLD AUTO: 7.8 %
NEUTROPHILS # BLD AUTO: 4.32 K/UL
NEUTROPHILS NFR BLD AUTO: 54.3 %
NONHDLC SERPL-MCNC: 89 MG/DL
PLATELET # BLD AUTO: 294 K/UL
POTASSIUM SERPL-SCNC: 4.3 MMOL/L
PROT SERPL-MCNC: 7.5 G/DL
RBC # BLD: 4.21 M/UL
RBC # FLD: 13.2 %
SODIUM SERPL-SCNC: 137 MMOL/L
TRIGL SERPL-MCNC: 117 MG/DL
WBC # FLD AUTO: 7.94 K/UL

## 2021-04-09 ENCOUNTER — APPOINTMENT (OUTPATIENT)
Dept: INTERNAL MEDICINE | Facility: CLINIC | Age: 36
End: 2021-04-09
Payer: MEDICAID

## 2021-04-09 ENCOUNTER — OUTPATIENT (OUTPATIENT)
Dept: OUTPATIENT SERVICES | Facility: HOSPITAL | Age: 36
LOS: 1 days | Discharge: HOME | End: 2021-04-09

## 2021-04-09 VITALS
BODY MASS INDEX: 30.39 KG/M2 | SYSTOLIC BLOOD PRESSURE: 106 MMHG | HEART RATE: 62 BPM | HEIGHT: 64 IN | TEMPERATURE: 98.4 F | OXYGEN SATURATION: 99 % | DIASTOLIC BLOOD PRESSURE: 71 MMHG | WEIGHT: 178 LBS

## 2021-04-09 DIAGNOSIS — E55.9 VITAMIN D DEFICIENCY, UNSPECIFIED: ICD-10-CM

## 2021-04-09 DIAGNOSIS — Z85.07 PERSONAL HISTORY OF MALIGNANT NEOPLASM OF PANCREAS: Chronic | ICD-10-CM

## 2021-04-09 DIAGNOSIS — Z00.00 ENCOUNTER FOR GENERAL ADULT MEDICAL EXAMINATION W/OUT ABNORMAL FINDINGS: ICD-10-CM

## 2021-04-09 PROCEDURE — 99212 OFFICE O/P EST SF 10 MIN: CPT | Mod: GC

## 2021-04-09 RX ORDER — CHOLECALCIFEROL (VITAMIN D3) 1250 MCG
1.25 MG CAPSULE ORAL WEEKLY
Qty: 15 | Refills: 0 | Status: ACTIVE | COMMUNITY
Start: 2021-04-09 | End: 1900-01-01

## 2021-04-09 RX ORDER — OXYMETAZOLINE HCL 0.05 %
0.05 SPRAY, NON-AEROSOL (ML) NASAL TWICE DAILY
Qty: 1 | Refills: 2 | Status: ACTIVE | COMMUNITY
Start: 2021-04-09 | End: 1900-01-01

## 2021-04-09 NOTE — PHYSICAL EXAM
[No Acute Distress] : no acute distress [Well Nourished] : well nourished [Well Developed] : well developed [No Respiratory Distress] : no respiratory distress  [No Accessory Muscle Use] : no accessory muscle use [Clear to Auscultation] : lungs were clear to auscultation bilaterally [Normal Rate] : normal rate  [Regular Rhythm] : with a regular rhythm [Normal S1, S2] : normal S1 and S2 [No Murmur] : no murmur heard [Soft] : abdomen soft [Non Tender] : non-tender [Non-distended] : non-distended [No CVA Tenderness] : no CVA  tenderness [No Joint Swelling] : no joint swelling [Grossly Normal Strength/Tone] : grossly normal strength/tone [Normal Affect] : the affect was normal [Normal Insight/Judgement] : insight and judgment were intact

## 2021-04-22 DIAGNOSIS — Z00.00 ENCOUNTER FOR GENERAL ADULT MEDICAL EXAMINATION WITHOUT ABNORMAL FINDINGS: ICD-10-CM

## 2021-04-22 DIAGNOSIS — R09.81 NASAL CONGESTION: ICD-10-CM

## 2021-04-22 DIAGNOSIS — D64.9 ANEMIA, UNSPECIFIED: ICD-10-CM

## 2021-04-22 DIAGNOSIS — E55.9 VITAMIN D DEFICIENCY, UNSPECIFIED: ICD-10-CM

## 2021-04-24 NOTE — HISTORY OF PRESENT ILLNESS
[FreeTextEntry1] : Follow-up  [de-identified] : Patient is a 35 y/o female with medical history of Pancreatic Cancer s/p Whipple's procedure in 2018 presenting for follow-up Visit,  has no major complaints today

## 2021-04-24 NOTE — REVIEW OF SYSTEMS
[Fever] : no fever [Chills] : no chills [Fatigue] : no fatigue [Chest Pain] : no chest pain [Palpitations] : no palpitations [Leg Claudication] : no leg claudication [Orthopnea] : no orthopnea [Shortness Of Breath] : no shortness of breath [Wheezing] : no wheezing [Cough] : no cough [Dyspnea on Exertion] : no dyspnea on exertion [Abdominal Pain] : no abdominal pain [Nausea] : no nausea [Constipation] : no constipation [Diarrhea] : diarrhea [Vomiting] : no vomiting [Dysuria] : no dysuria [Incontinence] : no incontinence [Hematuria] : no hematuria [Frequency] : no frequency [Joint Pain] : no joint pain [Joint Stiffness] : no joint stiffness [Joint Swelling] : no joint swelling [Headache] : no headache [Dizziness] : no dizziness [Fainting] : no fainting [Suicidal] : not suicidal [Anxiety] : no anxiety [Depression] : no depression

## 2021-04-24 NOTE — ASSESSMENT
[FreeTextEntry1] : Patient is a 37 y/o female with medical history of Pancreatic Cancer s/p Whipple's procedure in 2018 presenting for follow-up Visit \par \par Iron Deficiency Anemia: Improving \par -Endorses menorrhagia \par -S/P Venofer x 5 doses \par -Follows with heme/onc \par \par Nasal Congestion\par -Difficulty to breath from nose, prefers mouth breathing\par -Has appointment with ENT \par -Nasal spray prescribed for now \par \par Vitamin D Deficiency \par -Weakly Vit D 5000U  for 8 weeks \par -Then 2000U daily \par \par HCM\par -Flu shot: Received Sept 2020 \par -PAP + HPV: Done 2020, unremarkable, repeat 2025\par -RTC in 6 months

## 2021-04-28 ENCOUNTER — APPOINTMENT (OUTPATIENT)
Dept: OTOLARYNGOLOGY | Facility: CLINIC | Age: 36
End: 2021-04-28
Payer: MEDICAID

## 2021-04-28 VITALS — BODY MASS INDEX: 27.16 KG/M2 | HEIGHT: 65 IN | WEIGHT: 163 LBS

## 2021-04-28 DIAGNOSIS — J01.90 ACUTE SINUSITIS, UNSPECIFIED: ICD-10-CM

## 2021-04-28 PROCEDURE — 31231 NASAL ENDOSCOPY DX: CPT

## 2021-04-28 PROCEDURE — 99204 OFFICE O/P NEW MOD 45 MIN: CPT | Mod: 25

## 2021-04-28 PROCEDURE — 99072 ADDL SUPL MATRL&STAF TM PHE: CPT

## 2021-04-28 RX ORDER — METHYLPREDNISOLONE 4 MG/1
4 TABLET ORAL
Qty: 1 | Refills: 0 | Status: ACTIVE | COMMUNITY
Start: 2021-04-28 | End: 1900-01-01

## 2021-04-28 NOTE — PHYSICAL EXAM
[Nasal Endoscopy Performed] : nasal endoscopy was performed, see procedure section for findings [Midline] : trachea located in midline position [Normal] : external appearance is normal [de-identified] : edema [de-identified] : purulence

## 2021-04-28 NOTE — PROCEDURE
[Flexible Endoscope] : examined with the flexible endoscope [Recalcitrant Symptoms] : recalcitrant symptoms  [Congested] : congested [Normal] : the paranasal sinuses had no abnormalities [S-Shaped Deviated] : S-shape deviation [Paranasal Sinuses Middle Meatus] : bilateral purulence

## 2021-04-28 NOTE — HISTORY OF PRESENT ILLNESS
[de-identified] : Patient presents today with c/o nasal obstruction. No nasal injury. Patient breathes through her mouth a lot. No snoring at night. She admits persistent nasal congestion. She began using a nasal spray last week and it has been helping. She has seasonal/environmental allergies, she does not take any allergy meds. She gets itchy, watery eyes and clears her throat.

## 2021-05-04 ENCOUNTER — APPOINTMENT (OUTPATIENT)
Dept: HEMATOLOGY ONCOLOGY | Facility: CLINIC | Age: 36
End: 2021-05-04
Payer: MEDICAID

## 2021-05-04 ENCOUNTER — LABORATORY RESULT (OUTPATIENT)
Age: 36
End: 2021-05-04

## 2021-05-04 VITALS
SYSTOLIC BLOOD PRESSURE: 136 MMHG | RESPIRATION RATE: 16 BRPM | HEART RATE: 64 BPM | HEIGHT: 65 IN | WEIGHT: 175 LBS | TEMPERATURE: 97.1 F | DIASTOLIC BLOOD PRESSURE: 79 MMHG | BODY MASS INDEX: 29.16 KG/M2

## 2021-05-04 LAB
HCT VFR BLD CALC: 34.5 %
HGB BLD-MCNC: 11.6 G/DL
MCHC RBC-ENTMCNC: 29.3 PG
MCHC RBC-ENTMCNC: 33.6 G/DL
MCV RBC AUTO: 87.1 FL
PLATELET # BLD AUTO: 267 K/UL
PMV BLD: 10.3 FL
RBC # BLD: 3.96 M/UL
RBC # FLD: 13.1 %
WBC # FLD AUTO: 6.93 K/UL

## 2021-05-04 PROCEDURE — 99213 OFFICE O/P EST LOW 20 MIN: CPT

## 2021-05-05 LAB
ALBUMIN SERPL ELPH-MCNC: 4.4 G/DL
ALP BLD-CCNC: 100 U/L
ALT SERPL-CCNC: 22 U/L
ANION GAP SERPL CALC-SCNC: 13 MMOL/L
AST SERPL-CCNC: 21 U/L
BILIRUB SERPL-MCNC: 0.4 MG/DL
BUN SERPL-MCNC: 12 MG/DL
CALCIUM SERPL-MCNC: 9.3 MG/DL
CANCER AG19-9 SERPL-ACNC: 12 U/ML
CHLORIDE SERPL-SCNC: 103 MMOL/L
CO2 SERPL-SCNC: 23 MMOL/L
CREAT SERPL-MCNC: 0.6 MG/DL
GLUCOSE SERPL-MCNC: 91 MG/DL
POTASSIUM SERPL-SCNC: 4.6 MMOL/L
PROT SERPL-MCNC: 7.1 G/DL
SODIUM SERPL-SCNC: 139 MMOL/L

## 2021-05-06 LAB — FERRITIN SERPL-MCNC: 24 NG/ML

## 2021-05-07 ENCOUNTER — APPOINTMENT (OUTPATIENT)
Dept: INFUSION THERAPY | Facility: CLINIC | Age: 36
End: 2021-05-07

## 2021-05-07 RX ORDER — IRON SUCROSE 20 MG/ML
200 INJECTION, SOLUTION INTRAVENOUS ONCE
Refills: 0 | Status: COMPLETED | OUTPATIENT
Start: 2021-05-07 | End: 2021-05-07

## 2021-05-07 RX ADMIN — IRON SUCROSE 220 MILLIGRAM(S): 20 INJECTION, SOLUTION INTRAVENOUS at 16:12

## 2021-05-07 RX ADMIN — IRON SUCROSE 200 MILLIGRAM(S): 20 INJECTION, SOLUTION INTRAVENOUS at 16:45

## 2021-05-07 NOTE — PHYSICAL EXAM
[Normal] : affect appropriate [de-identified] : healed scar of surgery , no tenderness or redness. no palpable masses.  [de-identified] : Paraspinal pain posterior neck and upper back, not significantly changed with palpation. No midline tenderness.

## 2021-05-07 NOTE — ASSESSMENT
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 CT chest from 1/27/2021 revealed 2 mm subpleural RLL nodule, stable since 2018. Previously described RUL nodules not visualized.\par - Continue with  surveillance scans on yearly basis.\par - Reviewed Bone scan: normal whole body bone images/ negative for any mastitic disease \par - Follow up CBC CMP Ca 19-9\par - Follow up with ENT for deviated septum \par - RTC in 3 months or sooner if symptoms arise \par \par Patient seen and examined with Dr. Murray who agrees with plan of care.\par

## 2021-05-07 NOTE — HISTORY OF PRESENT ILLNESS
[de-identified] : \par 32 yo female , Kinyarwanda speaking, born in [place] , interviewed with  # [id number]. Patient with  with no significant  significant medical history . S/p pancreaticoduodenectomy, pylorus-preserving R0 resection of  for pancreatic mass diagnosed when she presented with epigastric and periumbilical swelling for 3 months.  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  surgery complicated with a sepsis - possibly from aspiration pneumonia. \par type A pancreatic fistula and early resolution of delayed gastric emptying. She continues to have drainage 10 to 20cc daily . She denies fever, nausea, vomiting , abdominal pain or diarrhea. \par \par \par \par  [de-identified] : 06/14/2018 patient returns for follow up , she feels well , no abdominal pain or weight loss. no nausea or vomiting . Blood work was notable for mild normocytic anemia , Ferritin 38 . She denies hematochezia or heavy menses. \par \par 08/21/2018 : Patient returns for follow up , she is on iron one daily , Hb improved to 12. She complains of slight increase in epigastric discomfort , no nausea, vomiting , pruritis, change in bowel habits. no weight loss. \par \par 06/03/2019 Patient returns for history of pancreatic cancer s/p Whipple's with no evidence of disease. Iron deficiency anemia s/p venofer .She feels well and denies any GI symptoms . no weight loss, nausea vomiting . Recent blood work shows slightly elevated alkaline phosphatase .\par \par 01/27/2020 Patient returns with chief complaint of interscapular pain for 2 to 3 weeks , worse with activity and present at night . 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 She reports urinary frequency without dysuria , change in bowel habits , leg weakness or numbness. \par \par 02/10/2020\par Patient returns for history of pancreatic cancer s/p Whipple's with no evidence of disease. Ferritin of 8 noted on 01/27/2020. On venofer. Her interscapular pain is better with cyclobenzaprine. X ray did not reveal any fractures. She complains of increased urinary frequency. No fevers. \par Her menses are regular and normal. \par \par 6/11/2020: SHIELA VERMA is a 35 year old female here today for follow up visit for history of EDWIN and pancreatic cancer s/p Whipples. She feels well besdies mild fatigue. She denies any GI symptoms,  weight loss, nausea, vomiting, or hematochezia. she received Venofer 200mg IV x5 doses; last dose was Feb 20, 2020. She complains of urticaria around her areolas which started about a week ago. Last mammogram was May 2019 which showed no evidence for malignancy. CBC reviewed normocytic anemia noted. Will recheck iron studies and ferritin on 6/11/2020\par \par 9/17/2020: SHIELA VERMA is a 35 year old female here today for follow up visit for history of EDWIN and pancreatic cancer s/p Whipples. She feels well besides mild fatigue. She denies any GI symptoms,  weight loss, nausea, vomiting, or hematochezia. she received Venofer 200mg IV x5 doses; last dose was Feb 20, 2020. CT C/A/P was completed on 9/8/2020 CT A/P was negative for disease; post surgical changes from Whipple procedure. CT of chest showed : \par IMPRESSION:\par Since January 23, 2018\par Nonspecific 2 mm subpleural nodule in the right lower lobe which may represent a focus of scarring/atelectasis\par Scattered 2 mm nodules in the right upper lobe which may be postinflammatory in etiology.\par Patient denies cough at this time. She states that she does have mild SOB on exertion. \par \par 2/2/2021: Patient presents for follow up. Complains of difficulty breathing for several weeks. Upon further clarification, denies overt shortness of breath or cough. Has nasal congestion. Also complains of posterior neck and paraspinal upper back pain and sensation of warmth for about the same length of time. Denies nausea, vomiting, anorexia. She is asking to have her blood levels checked because of her history of iron deficiency (last received venofer in 2/2020). Her PMD checked labs in 12/2020, and she had hemoglobin of 12 with 16% saturation and ferritin of 25. Patient does not take oral iron.\par \par 05/04/2021: SHIELA is here for follow up visit for history of pancreatic cancer s/p Whipple procedure. In addition, she has EDWIN; not currently on iron supplement at this time. She denies weight loss, abdominal pain, increased fatigue, SOB, or hematochezia at this time. She does complain of increased nasal congestion. She was seen by ENT who started her on a course of doxycycline, steroids, and fluticasone. Posterior neck and paraspinal pain has improved. Bone scan was completed in March 2021 which was normal.

## 2021-05-07 NOTE — REVIEW OF SYSTEMS
[Fatigue] : fatigue [Muscle Pain] : muscle pain [Negative] : Neurological [Shortness Of Breath] : no shortness of breath [Cough] : no cough [FreeTextEntry9] : pain posterior neck and upper thoracic spine- improved

## 2021-05-14 ENCOUNTER — APPOINTMENT (OUTPATIENT)
Dept: INFUSION THERAPY | Facility: CLINIC | Age: 36
End: 2021-05-14

## 2021-05-14 RX ORDER — IRON SUCROSE 20 MG/ML
200 INJECTION, SOLUTION INTRAVENOUS ONCE
Refills: 0 | Status: COMPLETED | OUTPATIENT
Start: 2021-05-14 | End: 2021-05-14

## 2021-05-14 RX ADMIN — IRON SUCROSE 220 MILLIGRAM(S): 20 INJECTION, SOLUTION INTRAVENOUS at 16:46

## 2021-05-21 ENCOUNTER — OUTPATIENT (OUTPATIENT)
Dept: OUTPATIENT SERVICES | Facility: HOSPITAL | Age: 36
LOS: 1 days | Discharge: HOME | End: 2021-05-21

## 2021-05-21 ENCOUNTER — APPOINTMENT (OUTPATIENT)
Dept: INFUSION THERAPY | Facility: CLINIC | Age: 36
End: 2021-05-21

## 2021-05-21 DIAGNOSIS — Z85.07 PERSONAL HISTORY OF MALIGNANT NEOPLASM OF PANCREAS: Chronic | ICD-10-CM

## 2021-05-21 RX ORDER — IRON SUCROSE 20 MG/ML
200 INJECTION, SOLUTION INTRAVENOUS ONCE
Refills: 0 | Status: COMPLETED | OUTPATIENT
Start: 2021-05-21 | End: 2021-05-21

## 2021-05-21 RX ADMIN — IRON SUCROSE 220 MILLIGRAM(S): 20 INJECTION, SOLUTION INTRAVENOUS at 16:49

## 2021-05-21 NOTE — PATIENT PROFILE ADULT. - TEACHING/LEARNING CULTURAL CONSIDERATIONS
Patient: Jesus Luna Date: 2021   : 1950    70 year old male      OUTPATIENT WOUND CARE PROGRESS NOTE    Supervising Wound Care / Hyperbaric Medicine Physician: Not Applicable  Consulting Provider:  Letha Loera NP  Date of Consultation/Last Comprehensive Exam:  21  Referring  Provider:  Kate LIN    SUBJECTIVE:    Chief Complaint:  Wound to bottom, abdomen and near right groin      Wound/Ulcer Present:    Pressure ulcer, other sites  Other, Moisture related dermatitis/breakdown    Additional Wound Category:  None     Maximum Baseline Ambulatory Status:  Ambulates unassisted    History of Present Illness:  This is a 70 year old male with a past medical history consisting of but not limited to diabetes, rectal cancer with metastasis to the liver, also concern for lesions on the brain.  MRI ordered, not yet scheduled.  Wife is present for today's visit and provides the history.      Wife noticed a red spot to his coccyx region ~ 3-5 days ago.  States due to the chemo her  has been spending more time sitting/in bed as he has not been feeling well.  He now has a worsening wound to his coccyx region.  Also has an open wound to his left abdominal fold and right groin fold.  Wife did order Interdry online, just received it earlier today so she has not started using the product yet.      Patient does complain of intermittent pain to the left abdominal fold and coccyx wound when touched/cleansed.  His appetite is poor, drinks Ensure drinks (per wife not as often as he should).    Interval HX 2021 : Patient presented for a follow up visit with his family. He denies any fevers, chills, nausea or vomiting. He is able to tolerate current wound care treatment w/o any issues.     Current Treatment Regimen:  Dressing:  Nystatin/intradry    Frequency:  Daily   Changed by:  Family and Home care agency nurse    Review of Systems:  Pertinent items are noted in HPI (history of  none present illness).    Past Medical History:   Diagnosis Date   • Acute kidney injury (CMS/HCC) 2/22/2021   • Allergy    • Arthritis     knees   • CAD (coronary artery disease)    • Chronic kidney disease    • Congestive cardiac failure (CMS/HCC)    • Contact dermatitis    • Dysplastic nevi    • Erectile dysfunction    • Low back pain    • Malignant neoplasm (CMS/HCC) 2/2014    rectal   • Neuromuscular disorder (CMS/HCC)     slight numbness to bilateral pedias   • Obesity, unspecified    • Other and unspecified hyperlipidemia     high tricglycerides   • Rectal mass 4/2014   • Tendonitis of shoulder, left 1/16/2014   • Type II or unspecified type diabetes mellitus without mention of complication, not stated as uncontrolled    • Unspecified essential hypertension      Past Surgical History:   Procedure Laterality Date   • Cardiac catherization     • Cardiac surgery  2002    CABG   • Colonoscopy  4/07/2014    Affi 1yr recall, colon ca    • Colonoscopy diagnostic  8/22/16    Affi 3yr recall, 1 polyp tubular adenoma bx's no cancer   • Colonoscopy diagnostic  07/30/2019    Affi, hx rectal CA, 4 tubular adenoma, 3 years   • Colonoscopy diagnostic  03/04/2021    Affi abn imaging high-grade dysplasia    • Coronary angiogram - cv  2002   • Coronary artery bypass graft  2002   • Esophagogastroduodenoscopy transoral flex diag  03/04/2021    Affi abn ct high-grade dysplasia    • Flexible sigmoidoscopy  10/04/2018    Affi, radiation proc, no sedation   • Icd implant     • Laparoscopy, cholecystectomy  02/17/2021    with liver biopsy   • Service to gastroenterology  4/7/2014    Colonoscopy   • Tonsillectomy and adenoidectomy       Social History     Socioeconomic History   • Marital status: /Civil Union     Spouse name: Not on file   • Number of children: Not on file   • Years of education: Not on file   • Highest education level: Not on file   Occupational History   • Not on file   Tobacco Use   • Smoking status: Former  Smoker     Years: 25.00     Types: Cigarettes     Quit date: 1988     Years since quittin.4   • Smokeless tobacco: Never Used   Substance and Sexual Activity   • Alcohol use: Yes     Alcohol/week: 2.0 standard drinks     Types: 2 Shots of liquor per week     Comment: occasional   • Drug use: No   • Sexual activity: Not on file   Other Topics Concern   • Not on file   Social History Narrative   • Not on file     Social Determinants of Health     Financial Resource Strain:    • Social Determinants: Financial Resource Strain:    Food Insecurity:    • Social Determinants: Food Insecurity:    Transportation Needs:    • Lack of Transportation (Medical):    • Lack of Transportation (Non-Medical):    Physical Activity:    • Days of Exercise per Week:    • Minutes of Exercise per Session:    Stress:    • Social Determinants: Stress:    Social Connections:    • Social Determinants: Social Connections:    Intimate Partner Violence: Not At Risk   • Social Determinants: Intimate Partner Violence Past Fear: No   • Social Determinants: Intimate Partner Violence Current Fear: No     Family History   Problem Relation Age of Onset   • Heart disease Brother    • Heart disease Brother    • Heart disease Brother    • Heart disease Mother    • Stroke Father    • Hypertension Father    • Arthritis Daughter    • Other Son         born with one kidney   • Cancer Paternal Grandfather        Current Outpatient Medications   Medication Sig   • ASPIRIN 81 PO Take 1 tablet by mouth daily.   • metFORMIN (GLUCOPHAGE) 500 MG tablet Take 500 mg by mouth 2 times daily (with meals).   • nystatin (MYCOSTATIN) 002501 UNIT/GM powder Apply to left abdominal fold and right groin fold daily.  Leave open to air.   • ketoconazole (NIZORAL) 2 % cream Apply topically two times daily to affected areas   • silver sulfADIAZINE (THERMAZENE) 1 % cream Apply topically daily. Apply to a thickness of 1/16 inch (\"nickel thick\").   • HYDROcodone-acetaminophen  (NORCO) 7.5-325 MG per tablet Take 1 tablet by mouth every 6 hours as needed for Pain.   • insulin glargine (Lantus SoloStar) 100 UNIT/ML pen-injector Inject 50 Units into the skin 2 times daily. Prime 2 units before each dose.   • Lidocaine HCl (60 ML EACH OF LIDOCAINE,DIPHENHYDRAMINE, AND MAALOX) Swish and swallow 5 mls 4 times daily (before meals and nightly) as needed   • prochlorperazine (COMPAZINE) 10 MG tablet Take 1 tablet by mouth every 6 hours as needed for Nausea or Vomiting.   • ondansetron (ZOFRAN) 8 MG tablet Take 1 tablet by mouth 2 times daily. Take for 2 days post chemotherapy.   • valsartan (DIOVAN) 80 MG tablet Take 1 tablet by mouth daily.   • Continuous Blood Gluc Sensor (Mas Con Movil Destiney Sensor System) Misc To use for continuous glucose monitoring   • carvedilol (COREG) 25 MG tablet Take 1 tablet by mouth 2 times daily (with meals).   • Cholecalciferol (Vitamin D3) 50 mcg (2,000 units) capsule Take 100 mcg by mouth daily.    • Fluticasone Propionate (FLONASE NA) Spray in each nostril daily.   • furosemide (LASIX) 40 MG tablet Take 1 tablet by mouth daily.   • cetirizine (ZYRTEC) 10 MG tablet Take 10 mg by mouth daily as needed.    • aspirin 325 MG tablet Take 1 tablet by mouth daily. (Patient taking differently: Take 81 mg by mouth daily. )   • blood glucose test strip Test blood sugar 3 times daily as directed. Diagnosis: E11.9   • HUMALOG KWIKPEN 100 UNIT/ML pen-injector INJECT SUBCUTANEOUSLY 24  UNITS 3 TIMES DAILY (Patient taking differently: Per patient sliding scale)   • metOLazone (ZAROXOLYN) 2.5 MG tablet Take 1 tablet by mouth as needed (fluid retention (maximum of 1 dose per day)).   • B-D U/F PEN NEEDLE 5/16\" 31G X 8 MM Misc USE AS DIRECTED TO INJECT  INSULIN   • Blood Glucose Monitoring Suppl (TRUE METRIX AIR GLUCOSE METER) w/Device Kit use as directed   • psyllium (METAMUCIL) 58.6 % packet Take 1 packet by mouth daily as needed.      No current facility-administered medications for  this encounter.     Facility-Administered Medications Ordered in Other Encounters   Medication   • heparin 100 UNIT/ML lock flush 500 Units        ALLERGIES:  Penicillins    OBJECTIVE:  Vital Signs:    Visit Vitals  /74 (BP Location: RUE - Right upper extremity, Patient Position: Sitting)   Pulse 71   Temp 96.7 °F (35.9 °C) (Temporal)   Resp 18   Ht 5' 10\" (1.778 m)   Wt 127.1 kg   BMI 40.20 kg/m²         Physical Exam:  General appearance: Appears stated age, obese and in no distress  Head:   normocephalic without obvious abnormality  Eye:  Conjunctivae/sclerae normal. No erythema, edema or exudate.  Mouth:   mask in place  Pulmonary: normal respiratory effort  Abdomen: see below  Extremities: extremities normal, atraumatic, no cyanosis or edema     Right groin fold with small open, circular wound.  No significant depth or tunneling noted.  Viable tissue at the base, no signs of infection noted.    5/21/2021 right groin fold      Abdomen is obese, large pannus.  Left abdominal fold with light erythema, moisture related dermatitis.  Scattered areas of ulcerations noted with majority of wound base granular.    No purulence or evidence of acute infection.    5/21/2021 left abdominal fold        Coccyx healed .      5/21/2021 coccyx      Wound Bed Quality:  As above      Lisette-wound Quality:    As above    Additional Descriptors:  none    Wound Measurements Per Flowsheet:       Wound Sacrum Midline/Middle Pressure Injury (Active)   Wound Care Team Consult Date 05/07/21 05/07/21 1124   Wound Length (cm) 0 cm 05/21/21 0945   Wound Width (cm) 0 cm 05/21/21 0945   Wound Depth (cm) 0 cm 05/21/21 0945   Wound Surface Area (cm^2) 0 cm^2 05/21/21 0945   Wound Volume (cm^3) 0 cm^3 05/21/21 0945   Wound Volume Change (Initial) -2.85 cm3 05/21/21 0945   Wound Volume % Change (Initial) -100 % 05/21/21 0945   Number of days: 14       Wound Abdomen Left Skin fold Pressure Injury (Active)   Wound Care Team Consult Date 05/07/21  21 1124   Wound Length (cm) 0.8 cm 21 0945   Wound Width (cm) 0.2 cm 21 0945   Wound Depth (cm) 0.1 cm 21 0945   Wound Surface Area (cm^2) 0.16 cm^2 21 0945   Wound Volume (cm^3) 0.02 cm^3 21 0945   Wound Volume Change (Initial) -0.6 cm3 21 0945   Wound Volume % Change (Initial) -97.42 % 21 0945   Number of days: 14       Wound Scrotum Right Pressure Injury (Active)   Wound Care Team Consult Date 21 1124   Wound Length (cm) 0.4 cm 21 0945   Wound Width (cm) 0.4 cm 21 0945   Wound Depth (cm) 0.3 cm 21 0945   Wound Surface Area (cm^2) 0.16 cm^2 21 0945   Wound Volume (cm^3) 0.05 cm^3 21 0945   Wound Volume Change (Initial) -0.05 cm3 21 0945   Wound Volume % Change (Initial) -52 % 2145   Number of days: 14         PROCEDURE:  None performed  Not indicated    Procedure was Performed by:  Not applicable    Laboratory assessments reviewed:  No results found for: PAB   Albumin (g/dL)   Date Value   2021 2.8 (L)   2021 2.8 (L)   2021 2.8 (L)      No results available in last 24 hours    Lab Results   Component Value Date    WBC 13.2 (H) 2021    GLUCOSE 321 (H) 2021    HGBA1C 8.5 (H) 2021    CREATININE 1.38 (H) 2021    GFRA 43 10/07/2019    GFRNA 37 10/07/2019        Culture results:  No results found for: SDES No results found for: CULT     Diagnostic Assessments Reviewed:  No new update    Nutritional Assessment:  Prealbumin and/or Albumin reviewed    Wound treatment goals are palliative:  No    DIAGNOSES:  Pressure ulcer, other site, stage III coccyx, healed   Diabetes .  Protein malnutrition  Moisture related dermatitis, left abdominal fold   Right groin fold wound, possible site of folliculitis  Immunocompromised, receiving chemo    Medical Decision Makin/7/21 - Wound care consult for multiple wounds including a wound to the right groin, left abdominal fold and  coccyx region.  Healing compromised as he receiving chemo, mobility has been limited due to not feeling well, his diabetes and his obesity (large pannus)  5/21/21-Coccyx healed. Bilateral groin wounds improving.     Wound care:  Right groin:  Wash with soap and water, pat dry.  Apply Nystatin powder, leave SIMEON  Left Abdominal fold:  Wash with soap and water, pat dry.  Apply Nystatin powder then Interdry.  Above to be done daily by [facility]    Infection:  No acute infection noted today    Offloading:  Spends a lot of time in bed or chair due to chemo.  Encouraged him to switch positions every 2 hours    Nutrition:  Continue to consume high protein.  Drinking Ensure shakes, per wife not as often as he should.  Encouraged to drink 3 times/day    Diabetes:  Last HgbA1c was 8.5.  This will also compromise healing.  Encouraged improved diabetic control.      Follow up in 2-3 weeks.    Plan of Care:  Advanced Wound Care Recommendations:  See above  Percent Wound Closure from consult:  N/A, new consult 5/7/21   Care plan to augment wound closure:  Not applicable.  New consult    Significant note data copied forward from DELIA Smith and reviewed by me as needed in the formulation of this note and plan.    Greater than 50% of the visit was spent counseling regarding above issues; total time spent  20  minutes.       Carla Bryan NP  [facility]  NP pager [phone]  [facility]: [phone]  [facility]: [phone]  [facility]: [phone]

## 2021-05-23 ENCOUNTER — RESULT REVIEW (OUTPATIENT)
Age: 36
End: 2021-05-23

## 2021-05-23 ENCOUNTER — OUTPATIENT (OUTPATIENT)
Dept: OUTPATIENT SERVICES | Facility: HOSPITAL | Age: 36
LOS: 1 days | Discharge: HOME | End: 2021-05-23
Payer: MEDICAID

## 2021-05-23 DIAGNOSIS — Z85.07 PERSONAL HISTORY OF MALIGNANT NEOPLASM OF PANCREAS: Chronic | ICD-10-CM

## 2021-05-23 DIAGNOSIS — J01.90 ACUTE SINUSITIS, UNSPECIFIED: ICD-10-CM

## 2021-05-23 PROCEDURE — 70486 CT MAXILLOFACIAL W/O DYE: CPT | Mod: 26

## 2021-05-27 DIAGNOSIS — C80.1 MALIGNANT (PRIMARY) NEOPLASM, UNSPECIFIED: ICD-10-CM

## 2021-05-27 DIAGNOSIS — E61.1 IRON DEFICIENCY: ICD-10-CM

## 2021-05-27 DIAGNOSIS — K86.89 OTHER SPECIFIED DISEASES OF PANCREAS: ICD-10-CM

## 2021-05-27 NOTE — ASU PREOP CHECKLIST - WARM FLUIDS/WARM BLANKETS
Impression: S/P Adjacent Tissue Rearrangement Less Than 10 Square Centimeters; Full Thickness Eyelid Graft - right lower  lid OD - 20 Days. Encounter for other specified surgical aftercare  Z48.89. Plan: Graft alive on exam today; change kandis from Maxitrol to Erythromycin BID OD. RTC 1 month.
no

## 2021-06-09 NOTE — HISTORY OF PRESENT ILLNESS
· Due to cellulitis of left lower extremity  · Prior cultures are positive or for the strep, Proteus, Acinetobacter pansensitive  · Due to his rehospitalization will place on broad-spectrum antibiotics to cover for g negatives and resistant organisms  · Continue IV cefepime and vancomycin  · Consult pharmacy for vancomycin dosing assistance  · Consult podiatry for wound care of left lower extremity  · Elevate left lower extremity  · Await blood cultures drawn in ED    · Monitor on level to step-down for stability [FreeTextEntry1] : 34 yo female patient with a history of pancreatic cancer(solid pseudo-papillary neoplasm) 2018\par In for f/u and routine exam.

## 2021-06-10 ENCOUNTER — APPOINTMENT (OUTPATIENT)
Dept: OTOLARYNGOLOGY | Facility: CLINIC | Age: 36
End: 2021-06-10
Payer: MEDICAID

## 2021-06-10 DIAGNOSIS — R09.81 NASAL CONGESTION: ICD-10-CM

## 2021-06-10 DIAGNOSIS — J30.9 ALLERGIC RHINITIS, UNSPECIFIED: ICD-10-CM

## 2021-06-10 DIAGNOSIS — J34.2 DEVIATED NASAL SEPTUM: ICD-10-CM

## 2021-06-10 DIAGNOSIS — J34.3 HYPERTROPHY OF NASAL TURBINATES: ICD-10-CM

## 2021-06-10 DIAGNOSIS — J34.89 OTHER SPECIFIED DISORDERS OF NOSE AND NASAL SINUSES: ICD-10-CM

## 2021-06-10 DIAGNOSIS — J32.9 CHRONIC SINUSITIS, UNSPECIFIED: ICD-10-CM

## 2021-06-10 DIAGNOSIS — R22.0 LOCALIZED SWELLING, MASS AND LUMP, HEAD: ICD-10-CM

## 2021-06-10 PROCEDURE — 31231 NASAL ENDOSCOPY DX: CPT

## 2021-06-10 PROCEDURE — 99214 OFFICE O/P EST MOD 30 MIN: CPT | Mod: 25

## 2021-06-10 RX ORDER — DOXYCYCLINE HYCLATE 100 MG/1
100 CAPSULE ORAL
Qty: 42 | Refills: 3 | Status: ACTIVE | COMMUNITY
Start: 2021-04-28 | End: 1900-01-01

## 2021-06-10 RX ORDER — LEVOCETIRIZINE DIHYDROCHLORIDE 5 MG/1
5 TABLET ORAL DAILY
Qty: 30 | Refills: 3 | Status: ACTIVE | COMMUNITY
Start: 2021-04-28 | End: 1900-01-01

## 2021-06-10 RX ORDER — FLUTICASONE PROPIONATE 50 UG/1
50 SPRAY, METERED NASAL DAILY
Qty: 1 | Refills: 7 | Status: ACTIVE | COMMUNITY
Start: 2021-04-28 | End: 1900-01-01

## 2021-06-10 NOTE — PROCEDURE
[Congested] : congested [S-Shaped Deviated] : S-shape deviation [Normal] : the paranasal sinuses had no abnormalities

## 2021-06-10 NOTE — PHYSICAL EXAM
[] : septum deviated bilaterally [de-identified] : edema  [Normal] : mucosa is normal [Midline] : trachea located in midline position

## 2021-06-10 NOTE — ASSESSMENT
[FreeTextEntry1] : Risks, benefits, and alternatives of ablation nasal tissue septoplasty and turbinate reduction were explained including but not limited to bleeding, infection, persistent symptoms, numbness, perforation, change in smell, change in taste, need for additional surgery, etc...\par \par I have reviewed the risk and benefits of maxillary antrostomy with removal of contents,  ethmoidectomy, , and CT navigation with the patient. They include but are not limited to bleeding, infection, recurrent symptoms and return to OR, change in taste and smell, injury to the orbit and brain, CSF leak, and need for postoperative treatment. \par \par

## 2021-06-10 NOTE — HISTORY OF PRESENT ILLNESS
[FreeTextEntry1] : Patient returns today following up on nasal obstruction .  She had CT  performed , here to discuss results that were reviewed by me and shows polypoid changes. Pt has not improved and has severe obstruction despite medical treatment.

## 2021-07-02 ENCOUNTER — APPOINTMENT (OUTPATIENT)
Dept: HEMATOLOGY ONCOLOGY | Facility: CLINIC | Age: 36
End: 2021-07-02

## 2021-09-07 ENCOUNTER — EMERGENCY (EMERGENCY)
Facility: HOSPITAL | Age: 36
LOS: 0 days | Discharge: HOME | End: 2021-09-08
Attending: EMERGENCY MEDICINE | Admitting: EMERGENCY MEDICINE
Payer: MEDICAID

## 2021-09-07 VITALS
WEIGHT: 176.37 LBS | OXYGEN SATURATION: 100 % | HEART RATE: 72 BPM | HEIGHT: 64 IN | RESPIRATION RATE: 20 BRPM | TEMPERATURE: 98 F | SYSTOLIC BLOOD PRESSURE: 118 MMHG | DIASTOLIC BLOOD PRESSURE: 67 MMHG

## 2021-09-07 DIAGNOSIS — Z80.9 FAMILY HISTORY OF MALIGNANT NEOPLASM, UNSPECIFIED: ICD-10-CM

## 2021-09-07 DIAGNOSIS — R51.9 HEADACHE, UNSPECIFIED: ICD-10-CM

## 2021-09-07 DIAGNOSIS — Z20.822 CONTACT WITH AND (SUSPECTED) EXPOSURE TO COVID-19: ICD-10-CM

## 2021-09-07 DIAGNOSIS — K21.9 GASTRO-ESOPHAGEAL REFLUX DISEASE WITHOUT ESOPHAGITIS: ICD-10-CM

## 2021-09-07 DIAGNOSIS — Z85.07 PERSONAL HISTORY OF MALIGNANT NEOPLASM OF PANCREAS: ICD-10-CM

## 2021-09-07 DIAGNOSIS — R42 DIZZINESS AND GIDDINESS: ICD-10-CM

## 2021-09-07 DIAGNOSIS — Z79.1 LONG TERM (CURRENT) USE OF NON-STEROIDAL ANTI-INFLAMMATORIES (NSAID): ICD-10-CM

## 2021-09-07 DIAGNOSIS — Z85.07 PERSONAL HISTORY OF MALIGNANT NEOPLASM OF PANCREAS: Chronic | ICD-10-CM

## 2021-09-07 LAB
ALBUMIN SERPL ELPH-MCNC: 4.2 G/DL — SIGNIFICANT CHANGE UP (ref 3.5–5.2)
ALP SERPL-CCNC: 92 U/L — SIGNIFICANT CHANGE UP (ref 30–115)
ALT FLD-CCNC: 34 U/L — SIGNIFICANT CHANGE UP (ref 0–41)
ANION GAP SERPL CALC-SCNC: 9 MMOL/L — SIGNIFICANT CHANGE UP (ref 7–14)
APPEARANCE UR: CLEAR — SIGNIFICANT CHANGE UP
AST SERPL-CCNC: 25 U/L — SIGNIFICANT CHANGE UP (ref 0–41)
BACTERIA # UR AUTO: ABNORMAL
BASOPHILS # BLD AUTO: 0.02 K/UL — SIGNIFICANT CHANGE UP (ref 0–0.2)
BASOPHILS NFR BLD AUTO: 0.3 % — SIGNIFICANT CHANGE UP (ref 0–1)
BILIRUB SERPL-MCNC: 0.8 MG/DL — SIGNIFICANT CHANGE UP (ref 0.2–1.2)
BILIRUB UR-MCNC: NEGATIVE — SIGNIFICANT CHANGE UP
BUN SERPL-MCNC: 11 MG/DL — SIGNIFICANT CHANGE UP (ref 10–20)
CALCIUM SERPL-MCNC: 8.8 MG/DL — SIGNIFICANT CHANGE UP (ref 8.5–10.1)
CHLORIDE SERPL-SCNC: 103 MMOL/L — SIGNIFICANT CHANGE UP (ref 98–110)
CO2 SERPL-SCNC: 23 MMOL/L — SIGNIFICANT CHANGE UP (ref 17–32)
COLOR SPEC: SIGNIFICANT CHANGE UP
CREAT SERPL-MCNC: 0.5 MG/DL — LOW (ref 0.7–1.5)
DIFF PNL FLD: ABNORMAL
EOSINOPHIL # BLD AUTO: 0.2 K/UL — SIGNIFICANT CHANGE UP (ref 0–0.7)
EOSINOPHIL NFR BLD AUTO: 2.6 % — SIGNIFICANT CHANGE UP (ref 0–8)
EPI CELLS # UR: 1 /HPF — SIGNIFICANT CHANGE UP (ref 0–5)
GLUCOSE SERPL-MCNC: 106 MG/DL — HIGH (ref 70–99)
GLUCOSE UR QL: NEGATIVE — SIGNIFICANT CHANGE UP
HCG SERPL QL: NEGATIVE — SIGNIFICANT CHANGE UP
HCT VFR BLD CALC: 36.8 % — LOW (ref 37–47)
HGB BLD-MCNC: 12.2 G/DL — SIGNIFICANT CHANGE UP (ref 12–16)
HYALINE CASTS # UR AUTO: 0 /LPF — SIGNIFICANT CHANGE UP (ref 0–7)
IMM GRANULOCYTES NFR BLD AUTO: 0.5 % — HIGH (ref 0.1–0.3)
KETONES UR-MCNC: NEGATIVE — SIGNIFICANT CHANGE UP
LEUKOCYTE ESTERASE UR-ACNC: ABNORMAL
LIDOCAIN IGE QN: 16 U/L — SIGNIFICANT CHANGE UP (ref 7–60)
LYMPHOCYTES # BLD AUTO: 2.28 K/UL — SIGNIFICANT CHANGE UP (ref 1.2–3.4)
LYMPHOCYTES # BLD AUTO: 29.3 % — SIGNIFICANT CHANGE UP (ref 20.5–51.1)
MCHC RBC-ENTMCNC: 29.2 PG — SIGNIFICANT CHANGE UP (ref 27–31)
MCHC RBC-ENTMCNC: 33.2 G/DL — SIGNIFICANT CHANGE UP (ref 32–37)
MCV RBC AUTO: 88 FL — SIGNIFICANT CHANGE UP (ref 81–99)
MONOCYTES # BLD AUTO: 0.63 K/UL — HIGH (ref 0.1–0.6)
MONOCYTES NFR BLD AUTO: 8.1 % — SIGNIFICANT CHANGE UP (ref 1.7–9.3)
NEUTROPHILS # BLD AUTO: 4.6 K/UL — SIGNIFICANT CHANGE UP (ref 1.4–6.5)
NEUTROPHILS NFR BLD AUTO: 59.2 % — SIGNIFICANT CHANGE UP (ref 42.2–75.2)
NITRITE UR-MCNC: NEGATIVE — SIGNIFICANT CHANGE UP
NRBC # BLD: 0 /100 WBCS — SIGNIFICANT CHANGE UP (ref 0–0)
PH UR: 6 — SIGNIFICANT CHANGE UP (ref 5–8)
PLATELET # BLD AUTO: 276 K/UL — SIGNIFICANT CHANGE UP (ref 130–400)
POTASSIUM SERPL-MCNC: 4.1 MMOL/L — SIGNIFICANT CHANGE UP (ref 3.5–5)
POTASSIUM SERPL-SCNC: 4.1 MMOL/L — SIGNIFICANT CHANGE UP (ref 3.5–5)
PROT SERPL-MCNC: 7 G/DL — SIGNIFICANT CHANGE UP (ref 6–8)
PROT UR-MCNC: NEGATIVE — SIGNIFICANT CHANGE UP
RBC # BLD: 4.18 M/UL — LOW (ref 4.2–5.4)
RBC # FLD: 13.2 % — SIGNIFICANT CHANGE UP (ref 11.5–14.5)
RBC CASTS # UR COMP ASSIST: 9 /HPF — HIGH (ref 0–4)
SARS-COV-2 RNA SPEC QL NAA+PROBE: SIGNIFICANT CHANGE UP
SODIUM SERPL-SCNC: 135 MMOL/L — SIGNIFICANT CHANGE UP (ref 135–146)
SP GR SPEC: 1.01 — SIGNIFICANT CHANGE UP (ref 1.01–1.03)
UROBILINOGEN FLD QL: SIGNIFICANT CHANGE UP
WBC # BLD: 7.77 K/UL — SIGNIFICANT CHANGE UP (ref 4.8–10.8)
WBC # FLD AUTO: 7.77 K/UL — SIGNIFICANT CHANGE UP (ref 4.8–10.8)
WBC UR QL: 21 /HPF — HIGH (ref 0–5)

## 2021-09-07 PROCEDURE — 99283 EMERGENCY DEPT VISIT LOW MDM: CPT

## 2021-09-07 PROCEDURE — 93010 ELECTROCARDIOGRAM REPORT: CPT

## 2021-09-07 PROCEDURE — 70450 CT HEAD/BRAIN W/O DYE: CPT | Mod: 26,MA

## 2021-09-07 PROCEDURE — 99285 EMERGENCY DEPT VISIT HI MDM: CPT

## 2021-09-07 PROCEDURE — 70553 MRI BRAIN STEM W/O & W/DYE: CPT | Mod: 26,MA

## 2021-09-07 RX ORDER — ACETAMINOPHEN 500 MG
650 TABLET ORAL ONCE
Refills: 0 | Status: COMPLETED | OUTPATIENT
Start: 2021-09-07 | End: 2021-09-07

## 2021-09-07 RX ORDER — SODIUM CHLORIDE 9 MG/ML
1000 INJECTION, SOLUTION INTRAVENOUS ONCE
Refills: 0 | Status: COMPLETED | OUTPATIENT
Start: 2021-09-07 | End: 2021-09-07

## 2021-09-07 RX ORDER — MECLIZINE HCL 12.5 MG
12.5 TABLET ORAL ONCE
Refills: 0 | Status: COMPLETED | OUTPATIENT
Start: 2021-09-07 | End: 2021-09-07

## 2021-09-07 RX ADMIN — Medication 650 MILLIGRAM(S): at 14:20

## 2021-09-07 RX ADMIN — SODIUM CHLORIDE 1000 MILLILITER(S): 9 INJECTION, SOLUTION INTRAVENOUS at 14:24

## 2021-09-07 RX ADMIN — Medication 12.5 MILLIGRAM(S): at 14:24

## 2021-09-07 NOTE — ED ADULT NURSE REASSESSMENT NOTE - NS ED NURSE REASSESS COMMENT FT1
pt admitted for OBS services. Denies pain/discomfort at this time. IVL intact. safety and comfort measures in place. Will cont to monitor.

## 2021-09-07 NOTE — ED PROVIDER NOTE - CLINICAL SUMMARY MEDICAL DECISION MAKING FREE TEXT BOX
pt feels better after tylenol and meclizine. labs unremarkable, head ct: Focal hypodensity in the left frontal subcortical white matter - possible age indeterminate infarct or prominent perivascular space, can not exclude underlying met.  pt seen and eval by neuro, to place in EDOU for MRI.

## 2021-09-07 NOTE — ED ADULT NURSE NOTE - NSICDXFAMILYHX_GEN_ALL_CORE_FT
FAMILY HISTORY:  Father  Still living? Unknown  Diabetes, Age at diagnosis: Age Unknown    Mother  Still living? Yes, Estimated age: Age Unknown  Diabetes, Age at diagnosis: Age Unknown  Maternal family history of cancer, Age at diagnosis: Age Unknown

## 2021-09-07 NOTE — CONSULT NOTE ADULT - ATTENDING COMMENTS
Patient seen and examined and agree with above except as noted.  Patients history, notes, labs, imaging, vitals and meds reviewed personally.  Patients imaging shows lesion in left frontal which maybe artifactual however given history would image brain to rule out lesion.  Neurology exam normal no focal deficits.    Plan as above which was discussed with patient.

## 2021-09-07 NOTE — ED PROVIDER NOTE - PROGRESS NOTE DETAILS
TN - well and decreased n after meclizine; pt feeling ha and requested tylenol for pain; pain has subsided since. neurology consulted, will evaluate patient.

## 2021-09-07 NOTE — ED ADULT NURSE NOTE - NSICDXPASTMEDICALHX_GEN_ALL_CORE_FT
PAST MEDICAL HISTORY:  H/O gastroesophageal reflux (GERD)     H/O resection of pancreas pancreaticoduodenectomy - whipple procedure    History of cholecystectomy

## 2021-09-07 NOTE — CONSULT NOTE ADULT - SUBJECTIVE AND OBJECTIVE BOX
35 y/o F with PMH of Pancreatic cancer s/p Whipple's procedure (2018) has come to ED with c/o headache and dizziness for 3 days. It started suddenyl on sunday when she was moping the floor , it lasted for 30 min and then she went to sleep. Since then she has been having the headache intermittently , dull and generalized in nature. Denies any blurry vision, nausea or vomiting.      PAST MEDICAL & SURGICAL HISTORY:  H/O gastroesophageal reflux (GERD)  H/O resection of pancreas  pancreaticoduodenectomy - whipple procedure  History of cholecystectomy  H/O malignant neoplasm of pancreas      Vital Signs Last 24 Hrs  T(C): 36.7 (07 Sep 2021 13:15), Max: 36.7 (07 Sep 2021 13:15)  T(F): 98.1 (07 Sep 2021 13:15), Max: 98.1 (07 Sep 2021 13:15)  HR: 72 (07 Sep 2021 13:15) (72 - 72)  BP: 118/67 (07 Sep 2021 13:15) (118/67 - 118/67)  BP(mean): --  RR: 20 (07 Sep 2021 13:15) (20 - 20)  SpO2: 100% (07 Sep 2021 13:15) (100% - 100%)    Neurological Exam:   Mental status: Awake, alert and oriented x3.  Recent and remote memory intact.  Naming, repetition and comprehension intact.  Attention/concentration intact.  No dysarthria, no aphasia.  Fund of knowledge appropriate.    Cranial nerves: Pupils equally round and reactive to light, visual fields full, no nystagmus, extraocular muscles intact, V1 through V3 intact bilaterally and symmetric, face symmetric, hearing intact to finger rub, palate elevation symmetric, tongue was midline.  Motor:  MRC grading 5/5 b/l UE/LE.   strength 5/5.  Normal tone and bulk.  No abnormal movements.    Sensation: Intact to light touch, proprioception, and pinprick.   Coordination: No dysmetria on finger-to-nose and heel-to-shin.  No dysdiadokinesia.  Reflexes: 2+ in bilateral UE/LE, downgoing toes bilaterally. (-) Calabrese.  Gait: Narrow and steady. No ataxia.  Romberg negative    Labs:  CBC Full  -  ( 07 Sep 2021 14:20 )  WBC Count : 7.77 K/uL  RBC Count : 4.18 M/uL  Hemoglobin : 12.2 g/dL  Hematocrit : 36.8 %  Platelet Count - Automated : 276 K/uL  Mean Cell Volume : 88.0 fL  Mean Cell Hemoglobin : 29.2 pg  Mean Cell Hemoglobin Concentration : 33.2 g/dL  Auto Neutrophil # : 4.60 K/uL  Auto Lymphocyte # : 2.28 K/uL  Auto Monocyte # : 0.63 K/uL  Auto Eosinophil # : 0.20 K/uL  Auto Basophil # : 0.02 K/uL  Auto Neutrophil % : 59.2 %  Auto Lymphocyte % : 29.3 %  Auto Monocyte % : 8.1 %  Auto Eosinophil % : 2.6 %  Auto Basophil % : 0.3 %    09-07    135  |  103  |  11  ----------------------------<  106<H>  4.1   |  23  |  0.5<L>    Ca    8.8      07 Sep 2021 14:20    TPro  7.0  /  Alb  4.2  /  TBili  0.8  /  DBili  x   /  AST  25  /  ALT  34  /  AlkPhos  92  09-07    LIVER FUNCTIONS - ( 07 Sep 2021 14:20 )  Alb: 4.2 g/dL / Pro: 7.0 g/dL / ALK PHOS: 92 U/L / ALT: 34 U/L / AST: 25 U/L / GGT: x         < from: CT Head No Cont (09.07.21 @ 14:37) >  Focal hypodensity in the left frontal subcortical white matter could reflect an age indeterminate infarct versus a prominent perivascular space. Underlying small metastatic lesion cannot be completely excluded given the history of pancreatic cancer. Recommend further evaluation with contrast-enhanced MRI.    No intracranial hemorrhage, or midline shift.      < end of copied text >

## 2021-09-07 NOTE — ED PROVIDER NOTE - OBJECTIVE STATEMENT
36 y F PMHx of pancreatic ca s/p Whipple procedure, pancreatic stent placement for pseudopapillary tumor of pancreatic head on 02/05/18, and Fe def anemia c/o dizziness and ha x3 days. pt started to feel that the room is spinning and generalized headache 3 days ago. pt denies LOC, trauma, n/v/d/f. not on AC, no Fe meds at home.

## 2021-09-07 NOTE — CONSULT NOTE ADULT - ASSESSMENT
37 y/o F PMH of Ca pancreas s/p resection presented to ED with headache and dizziness. CTH showed a focal hypodensity in left frontal subcortical region.  Recommendation:  - Please obtain MRI of brain w/wo MAYURI to r/o any metastasis.  - Patient can be admitted in ED-OBs.  - Neurology will follow

## 2021-09-07 NOTE — ED PROVIDER NOTE - ATTENDING CONTRIBUTION TO CARE
35 y/o female with h/o pseudopapillary tumor of pancreatic head s/p Whipple 2018, anemia, in ER with c/o 3 day h/o dizziness/room spinning. also c/o generalized headache.  symptoms wax and wane.  no syncope/near syncope.  no visual changes.  no neck pain.  no motor weakness/paresthesias.  no difficulty with speech or ambulation.  no n/v.  no f/c.  no cp/sob.  no abd pain.  PE - nad, nc/at, eomi, perrl, op - clear, mmm, neck supple, cta b/l, no w/r/r, rrr, abd- soft, nt/nd, nabs, from x 4, A&O x 3, cn 2-12 intact, motor 5/5 b/l UE and LE, no sensory deficits, no ataxia.  -check labs, head ct, re-eval

## 2021-09-08 VITALS
OXYGEN SATURATION: 100 % | SYSTOLIC BLOOD PRESSURE: 118 MMHG | DIASTOLIC BLOOD PRESSURE: 71 MMHG | HEART RATE: 85 BPM | RESPIRATION RATE: 18 BRPM | TEMPERATURE: 98 F

## 2021-09-08 PROCEDURE — 99236 HOSP IP/OBS SAME DATE HI 85: CPT

## 2021-09-08 RX ORDER — ACETAMINOPHEN 500 MG
650 TABLET ORAL EVERY 6 HOURS
Refills: 0 | Status: DISCONTINUED | OUTPATIENT
Start: 2021-09-08 | End: 2021-09-08

## 2021-09-08 NOTE — ED CDU PROVIDER DISPOSITION NOTE - NSFOLLOWUPCLINICS_GEN_ALL_ED_FT
[facility]  Neurology  [address] [place] [address], [address]  [place] [address]  Phone: [phone]  Fax:

## 2021-09-08 NOTE — ED CDU PROVIDER INITIAL DAY NOTE - MEDICAL DECISION MAKING DETAILS
Patient presents with headache and dizziness. CT shows possible met, placed in obs for MRI. Patient symptoms have resolved. Pending MR read.

## 2021-09-08 NOTE — ED CDU PROVIDER INITIAL DAY NOTE - PROGRESS NOTE DETAILS
VSS. Patient resting comfortably. no HA/ dizziness . Awaiting official MRI read. Official MRI no metastatic disease, non-specific changes. Copy of MRI given to patient and need for follow-up with Neurology discussed. Return precautions discussed.

## 2021-09-08 NOTE — ED CDU PROVIDER INITIAL DAY NOTE - ATTENDING CONTRIBUTION TO CARE
35 yo F pmh of pancreatic ca sp whipple and anemia presents to the ED with headache and dizziness. Dizziness is described as room spinning. no numbness, tingling or weakness. CT scan in the ED shows a possible lesion that could be a met. Seen by neurology who recommends MRI. Patient placed in obs for further management. Patient feeling better, dizziness and headache resolved.     CONSTITUTIONAL: Well-developed; well-nourished; in no acute distress.   SKIN: warm, dry  HEAD: Normocephalic; atraumatic.  EYES: PERRL, EOMI, no conjunctival erythema  ENT: No nasal discharge; airway clear.  NECK: Supple; non tender.  CARD: S1, S2 normal;  Regular rate and rhythm.   RESP: No wheezes, rales or rhonchi.  ABD: soft non tender, non distended, no rebound or guarding  EXT: Normal ROM.  5/5 strength in all 4 extremities   LYMPH: No acute cervical adenopathy.  NEURO: A&Ox3, CN 2-11 intact, moving all extremities, 5/5 strength, equal sensation bilaterally, normal finger to nose exam.   PSYCH: Cooperative, appropriate.

## 2021-09-08 NOTE — ED CDU PROVIDER INITIAL DAY NOTE - DATE/TIME 2
NDC: [address]-4  LOT#: [id number]  Expiration Date: 2021    Dose: 1 mL  Site: Right Deltoid    Patient educated on use and side effects of medication. Name and  verified prior to injection. Pt tolerated? YES     Verified by ANA PAULA perez    Administered by Alan Cohn at 11:06 AM.    Patient Provided Medication: yes     Pt will be due next on  19 - 19        
08-Sep-2021 08:53

## 2021-09-08 NOTE — ED CDU PROVIDER DISPOSITION NOTE - PATIENT PORTAL LINK FT
You can access the FollowMyHealth Patient Portal offered by [facility] by registering at the following website: [email or web address]. By joining [employer]s FollowMyHealth portal, you will also be able to view your health information using other applications (apps) compatible with our system.

## 2021-09-08 NOTE — ED CDU PROVIDER DISPOSITION NOTE - CLINICAL COURSE
Patient presents with headache and dizziness. CT shows possible met, placed in obs for MRI. Patient symptoms have resolved. MR done that does not show any metastatic disease. + non specific white matter lesions. Results discussed with patient, copy printed. Discharged with neurology follow up and return precautions.

## 2021-09-09 ENCOUNTER — APPOINTMENT (OUTPATIENT)
Dept: NEUROLOGY | Facility: CLINIC | Age: 36
End: 2021-09-09
Payer: MEDICAID

## 2021-09-09 ENCOUNTER — NON-APPOINTMENT (OUTPATIENT)
Age: 36
End: 2021-09-09

## 2021-09-09 VITALS
BODY MASS INDEX: 29.16 KG/M2 | HEIGHT: 65 IN | OXYGEN SATURATION: 99 % | SYSTOLIC BLOOD PRESSURE: 112 MMHG | HEART RATE: 62 BPM | WEIGHT: 175 LBS | TEMPERATURE: 98.1 F | DIASTOLIC BLOOD PRESSURE: 73 MMHG

## 2021-09-09 PROCEDURE — 99203 OFFICE O/P NEW LOW 30 MIN: CPT

## 2021-09-09 RX ORDER — SUMATRIPTAN 5 MG/100UL
5 SPRAY NASAL
Qty: 3 | Refills: 3 | Status: ACTIVE | COMMUNITY
Start: 2021-09-09 | End: 1900-01-01

## 2021-09-09 NOTE — REVIEW OF SYSTEMS
[Dizziness] : dizziness [Lightheadedness] : lightheadedness [Migraine Headache] : migraine headaches [Negative] : Heme/Lymph

## 2021-09-09 NOTE — HISTORY OF PRESENT ILLNESS
[FreeTextEntry1] : SHIELA VERMA is a 36 year old woman is being seen as a new patient for headache and dizziness. She has history of pancreatic cancer s/p Whipple's procedure in 2018. She denies any history of headache in the past. She started to notice headache and dizziness on Sunday. She has hard time describing the dizziness but reported that she felt there was something moving in her head. She had headaches with throbbing and pulsating sensation in the right periorbital and frontal region with no nausea but reported mild light sensitivity. She denies any eye pain but reported blurred vision on distant letters. She went to [facility] ED where MRI brain showed no lesion or acute findings except for few non specific scattered small/punctate foci of white matter signal abnormality which are nonspecific in etiology. She was then discharged. She has similar pain on Monday for few hours, then milder on Tuesday and then headaches resolved.

## 2021-09-09 NOTE — PHYSICAL EXAM
[FreeTextEntry1] : Mental status: Awake, alert and oriented x3.  Recent and remote memory intact.  Naming, repetition and comprehension intact.  Attention/concentration intact.  No dysarthria, no aphasia.  Fund of knowledge appropriate.  \par Cranial nerves: Pupils equally round and reactive to light, visual fields full, no nystagmus, extraocular muscles intact, V1 through V3 intact bilaterally and symmetric, face symmetric, hearing intact to finger rub, palate elevation symmetric, tongue was midline. Fundoscopic exam shows normal optic nerve. \par Motor:  MRC grading 5/5 b/l UE/LE.   strength 5/5.  Normal tone and bulk.  No abnormal movements.  \par Sensation: Intact to light touch, proprioception, and pinprick. \par Coordination: No dysmetria on finger-to-nose and heel-to-shin.  No dysdiadokinesia.\par Reflexes: 2+ in bilateral UE/LE, downgoing toes bilaterally. (-) Calabrese.\par Gait: Narrow and steady. No ataxia.  Romberg negative\par \par

## 2021-10-01 ENCOUNTER — APPOINTMENT (OUTPATIENT)
Dept: INTERNAL MEDICINE | Facility: CLINIC | Age: 36
End: 2021-10-01

## 2021-10-05 ENCOUNTER — APPOINTMENT (OUTPATIENT)
Dept: OTOLARYNGOLOGY | Facility: AMBULATORY SURGERY CENTER | Age: 36
End: 2021-10-05

## 2021-10-22 ENCOUNTER — APPOINTMENT (OUTPATIENT)
Dept: SURGERY | Facility: CLINIC | Age: 36
End: 2021-10-22

## 2021-11-02 ENCOUNTER — LABORATORY RESULT (OUTPATIENT)
Age: 36
End: 2021-11-02

## 2021-11-02 ENCOUNTER — OUTPATIENT (OUTPATIENT)
Dept: OUTPATIENT SERVICES | Facility: HOSPITAL | Age: 36
LOS: 1 days | Discharge: HOME | End: 2021-11-02

## 2021-11-02 ENCOUNTER — APPOINTMENT (OUTPATIENT)
Dept: HEMATOLOGY ONCOLOGY | Facility: CLINIC | Age: 36
End: 2021-11-02
Payer: MEDICAID

## 2021-11-02 VITALS
BODY MASS INDEX: 30.66 KG/M2 | HEART RATE: 68 BPM | SYSTOLIC BLOOD PRESSURE: 114 MMHG | HEIGHT: 65 IN | WEIGHT: 184 LBS | RESPIRATION RATE: 14 BRPM | TEMPERATURE: 98.1 F | DIASTOLIC BLOOD PRESSURE: 74 MMHG

## 2021-11-02 DIAGNOSIS — Z85.07 PERSONAL HISTORY OF MALIGNANT NEOPLASM OF PANCREAS: Chronic | ICD-10-CM

## 2021-11-02 PROCEDURE — 99213 OFFICE O/P EST LOW 20 MIN: CPT

## 2021-11-03 LAB
ALBUMIN SERPL ELPH-MCNC: 4.4 G/DL
ALP BLD-CCNC: 131 U/L
ALT SERPL-CCNC: 32 U/L
ANION GAP SERPL CALC-SCNC: 16 MMOL/L
AST SERPL-CCNC: 26 U/L
BILIRUB SERPL-MCNC: 0.3 MG/DL
BUN SERPL-MCNC: 14 MG/DL
CALCIUM SERPL-MCNC: 8.9 MG/DL
CANCER AG19-9 SERPL-ACNC: 11 U/ML
CHLORIDE SERPL-SCNC: 103 MMOL/L
CO2 SERPL-SCNC: 19 MMOL/L
CREAT SERPL-MCNC: 0.6 MG/DL
FERRITIN SERPL-MCNC: 48 NG/ML
GLUCOSE SERPL-MCNC: 103 MG/DL
HCT VFR BLD CALC: 38.5 %
HGB BLD-MCNC: 13.1 G/DL
MCHC RBC-ENTMCNC: 30.3 PG
MCHC RBC-ENTMCNC: 34 G/DL
MCV RBC AUTO: 88.9 FL
PLATELET # BLD AUTO: 277 K/UL
PMV BLD: 10.3 FL
POTASSIUM SERPL-SCNC: 4.7 MMOL/L
PROT SERPL-MCNC: 7.6 G/DL
RBC # BLD: 4.33 M/UL
RBC # FLD: 12.9 %
SODIUM SERPL-SCNC: 138 MMOL/L
WBC # FLD AUTO: 7.46 K/UL

## 2021-11-03 NOTE — PHYSICAL EXAM
[Normal] : affect appropriate [Fully active, able to carry on all pre-disease performance without restriction] : Status 0 - Fully active, able to carry on all pre-disease performance without restriction [de-identified] : healed scar of surgery , no tenderness or redness. no palpable masses.  [de-identified] : Paraspinal pain posterior neck and upper back, not significantly changed with palpation. No midline tenderness.

## 2021-11-03 NOTE — HISTORY OF PRESENT ILLNESS
[de-identified] : \par 34 yo female , [place] speaking, born in [place] , interviewed with  # [id number]. Patient with  with no significant  significant medical history . S/p pancreaticoduodenectomy, pylorus-preserving R0 resection of  for pancreatic mass diagnosed when she presented with epigastric and periumbilical swelling for 3 months.  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  surgery complicated with a sepsis - possibly from aspiration pneumonia. \par type A pancreatic fistula and early resolution of delayed gastric emptying. She continues to have drainage 10 to 20cc daily . She denies fever, nausea, vomiting , abdominal pain or diarrhea. \par \par \par \par  [de-identified] : 06/14/2018 patient returns for follow up , she feels well , no abdominal pain or weight loss. no nausea or vomiting . Blood work was notable for mild normocytic anemia , Ferritin 38 . She denies hematochezia or heavy menses. \par \par 08/21/2018 : Patient returns for follow up , she is on iron one daily , Hb improved to 12. She complains of slight increase in epigastric discomfort , no nausea, vomiting , pruritis, change in bowel habits. no weight loss. \par \par 06/03/2019 Patient returns for history of pancreatic cancer s/p Whipple's with no evidence of disease. Iron deficiency anemia s/p venofer .She feels well and denies any GI symptoms . no weight loss, nausea vomiting . Recent blood work shows slightly elevated alkaline phosphatase .\par \par 01/27/2020 Patient returns with chief complaint of interscapular pain for 2 to 3 weeks , worse with activity and present at night . 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 She reports urinary frequency without dysuria , change in bowel habits , leg weakness or numbness. \par \par 02/10/2020\par Patient returns for history of pancreatic cancer s/p Whipple's with no evidence of disease. Ferritin of 8 noted on 01/27/2020. On venofer. Her interscapular pain is better with cyclobenzaprine. X ray did not reveal any fractures. She complains of increased urinary frequency. No fevers. \par Her menses are regular and normal. \par \par 6/11/2020: SHIELA VERMA is a 35 year old female here today for follow up visit for history of EDWIN and pancreatic cancer s/p Whipples. She feels well besdies mild fatigue. She denies any GI symptoms,  weight loss, nausea, vomiting, or hematochezia. she received Venofer 200mg IV x5 doses; last dose was Feb 20, 2020. She complains of urticaria around her areolas which started about a week ago. Last mammogram was May 2019 which showed no evidence for malignancy. CBC reviewed normocytic anemia noted. Will recheck iron studies and ferritin on 6/11/2020\par \par 9/17/2020: SHIELA VERMA is a 35 year old female here today for follow up visit for history of EDWIN and pancreatic cancer s/p Whipples. She feels well besides mild fatigue. She denies any GI symptoms,  weight loss, nausea, vomiting, or hematochezia. she received Venofer 200mg IV x5 doses; last dose was Feb 20, 2020. CT C/A/P was completed on 9/8/2020 CT A/P was negative for disease; post surgical changes from Whipple procedure. CT of chest showed : \par IMPRESSION:\par Since January 23, 2018\par Nonspecific 2 mm subpleural nodule in the right lower lobe which may represent a focus of scarring/atelectasis\par Scattered 2 mm nodules in the right upper lobe which may be postinflammatory in etiology.\par Patient denies cough at this time. She states that she does have mild SOB on exertion. \par \par 2/2/2021: Patient presents for follow up. Complains of difficulty breathing for several weeks. Upon further clarification, denies overt shortness of breath or cough. Has nasal congestion. Also complains of posterior neck and paraspinal upper back pain and sensation of warmth for about the same length of time. Denies nausea, vomiting, anorexia. She is asking to have her blood levels checked because of her history of iron deficiency (last received venofer in 2/2020). Her PMD checked labs in 12/2020, and she had hemoglobin of 12 with 16% saturation and ferritin of 25. Patient does not take oral iron.\par \par 05/04/2021: SHIELA is here for follow up visit for history of pancreatic cancer s/p Whipple procedure. In addition, she has EDWIN; not currently on iron supplement at this time. She denies weight loss, abdominal pain, increased fatigue, SOB, or hematochezia at this time. She does complain of increased nasal congestion. She was seen by ENT who started her on a course of doxycycline, steroids, and fluticasone. Posterior neck and paraspinal pain has improved. Bone scan was completed in March 2021 which was normal. \par \par 11/02/21\par Pt returns for f/u today . She reports feeling fine . Pt was admitted to hospital with headache and dizziness . She had MRI that showed : IMPRESSION:\par 1. No MRI evidence of intracranial metastatic disease. No acute infarct or intracranial hemorrhage.\par 2. Few scattered small/punctate foci of white matter signal abnormality which are nonspecific in etiology.\par Pt was seen by neurology and was given sumtriptan for migraines . Pt reports that she takes it on and off and her headaches have resolved . She denies any fever , chills, weight loss , loss of appetite and abdominal pain . Her sister was recently diagnosed with uterine mass ? and is being worked up by Gyn . Pt also reports that her mother had uterine cancer 20 years ago , and recently diagnosed with bladder cancer and had surgery done . Pt reports that she was seen by Gyn last year and everything was fine .

## 2021-11-03 NOTE — ASSESSMENT
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 CT chest from 1/27/2021 revealed 2 mm subpleural RLL nodule, stable since 2018. Previously described RUL nodules not visualized. Will repeat imaging now \par - Continue with  surveillance scans on yearly basis.\par - Reviewed Bone scan: normal whole body bone images/ negative for any mastitic disease \par - Follow up CBC CMP Ca 19-9 and ferritin today \par - discussed with she will need evaluation by genetic counsellor and might need testing for Hoang syndrome since there is a strong family history of endometrial , and bladder cancer .\par - RTC in 3 months or sooner if symptoms arise \par \par Patient seen and examined with Dr. Murray who agrees with plan of care.\par

## 2021-11-03 NOTE — REVIEW OF SYSTEMS
[Fatigue] : fatigue [Muscle Pain] : muscle pain [Negative] : Heme/Lymph [Shortness Of Breath] : no shortness of breath [Cough] : no cough [FreeTextEntry9] : pain posterior neck and upper thoracic spine- improved

## 2021-11-08 DIAGNOSIS — K86.89 OTHER SPECIFIED DISEASES OF PANCREAS: ICD-10-CM

## 2021-11-17 ENCOUNTER — TRANSCRIPTION ENCOUNTER (OUTPATIENT)
Age: 36
End: 2021-11-17

## 2021-11-21 ENCOUNTER — OUTPATIENT (OUTPATIENT)
Dept: OUTPATIENT SERVICES | Facility: HOSPITAL | Age: 36
LOS: 1 days | Discharge: HOME | End: 2021-11-21
Payer: MEDICAID

## 2021-11-21 ENCOUNTER — RESULT REVIEW (OUTPATIENT)
Age: 36
End: 2021-11-21

## 2021-11-21 DIAGNOSIS — K86.89 OTHER SPECIFIED DISEASES OF PANCREAS: ICD-10-CM

## 2021-11-21 DIAGNOSIS — Z85.07 PERSONAL HISTORY OF MALIGNANT NEOPLASM OF PANCREAS: Chronic | ICD-10-CM

## 2021-11-21 PROCEDURE — 74177 CT ABD & PELVIS W/CONTRAST: CPT | Mod: 26

## 2021-12-18 NOTE — HISTORY OF PRESENT ILLNESS
[de-identified] : 32 yo female patient without significant medical history comes today for followup visit. S/p pancreaticoduodenctomy, pylorus-preserving, complicated with a sepsis - possibly from aspiration pneumonia. She was discharged with one surgical drain. \par She had seen my partner post op on March 5th. At that time she had a well controlled, type A pancreatic fistula and early resolution of delayed gastric emptying. She was draining 30 ml a day of cloudy fluid from her left DIMITRY. She was tolerating regular diet with occasional nausea and vomiting. She was passing gas and having normal BMs. She was here today with her family for followup visit. \par She was admitted with some pain. She underwent a CT scan , blood work and culture. She possibly had a combination of post op UTI or yana pancreatic fluid. She was later discharged home on Levofloxacin and then Bactrim was added. \par 3/21/18: he is doing very well. She has a good appetite and is eating more. She ambulating and wants to go back to work. \par Her incisions have healed very well. Minimal scabs are there. \par The right DIMITRY drain is dry. The left DIMITRY drain still has some cloudy fluids the last 5 readings have been 10 , 20 , 15, 10 , 10 ml. So it is decreasing. \par 3/30/18: She again was doing very well. She had a good appetite and is eating more. She ambulating and wanted to go back to work. \par Her incisions had healed very well. The right DIMITRY drain is dry. The left DIMITRY drain still has some cloudy fluids the last 6 readings have been 10,9,9,8,7,5 So it is still decreasing. \par 4/4/18:She was doing very well. She has a good appetite and is eating more. She ambulating and wants to go back to work. \par Her incisions have healed very well. The right DIMITRY drain site was dry. The left DIMITRY drain still has some cloudy fluids the last 5 readings have been 5 ml each. So it is still decreasing. \par She is eating well and having regular bowel movements that are brown and sink. She was asked to follow up in 2 weeks for drain removal. \par She has seen Dr. Murray and is going to get a CT scan in 3 months. \par 5/4 Her drain output has only been 10 ml. She has been asymptomatic. We thus removed the drain. \par May : She is doing very well. She is very hungry and eating a lot . She is gaining weight. \par 6 month follow up : She is doing very well. She is very hungry and eating a lot . She is gaining weight. \par She has some stretching in the anterior abdominal wall when sneezing and coughing. \par We reviewed her latest labs and CT that were done by Dr. Murray\par 1 year follow up : She is doing well, She has regained normal life. She is at her pre op weight.  She has craving for sweet stuff [de-identified] : 1 1/2  year follow up : She was doing well, She has regained normal life. She is at her pre op weight.  \par She had been to [employer] where she ate a lot of fried food. \par Since coming back she has epigastric discomfort. Which is worse on breathing deep. \par She denies nausea / vomiting. \par Her stools are brown , well formed. She denies any blood in the stool \par  c/o right flank since last night

## 2021-12-22 ENCOUNTER — APPOINTMENT (OUTPATIENT)
Dept: SURGERY | Facility: CLINIC | Age: 36
End: 2021-12-22

## 2022-02-15 ENCOUNTER — APPOINTMENT (OUTPATIENT)
Dept: HEMATOLOGY ONCOLOGY | Facility: CLINIC | Age: 37
End: 2022-02-15

## 2022-02-28 ENCOUNTER — APPOINTMENT (OUTPATIENT)
Dept: HEMATOLOGY ONCOLOGY | Facility: CLINIC | Age: 37
End: 2022-02-28

## 2022-04-25 ENCOUNTER — APPOINTMENT (OUTPATIENT)
Dept: OBGYN | Facility: CLINIC | Age: 37
End: 2022-04-25
Payer: MEDICAID

## 2022-04-25 ENCOUNTER — OUTPATIENT (OUTPATIENT)
Dept: OUTPATIENT SERVICES | Facility: HOSPITAL | Age: 37
LOS: 1 days | Discharge: HOME | End: 2022-04-25

## 2022-04-25 ENCOUNTER — RESULT CHARGE (OUTPATIENT)
Age: 37
End: 2022-04-25

## 2022-04-25 VITALS
BODY MASS INDEX: 29.82 KG/M2 | WEIGHT: 179 LBS | DIASTOLIC BLOOD PRESSURE: 74 MMHG | SYSTOLIC BLOOD PRESSURE: 112 MMHG | HEIGHT: 65 IN

## 2022-04-25 DIAGNOSIS — Z85.07 PERSONAL HISTORY OF MALIGNANT NEOPLASM OF PANCREAS: Chronic | ICD-10-CM

## 2022-04-25 PROCEDURE — 99395 PREV VISIT EST AGE 18-39: CPT

## 2022-04-25 RX ORDER — PRENATAL WITH FERROUS FUM AND FOLIC ACID 3080; 920; 120; 400; 22; 1.84; 3; 20; 10; 1; 12; 200; 27; 25; 2 [IU]/1; [IU]/1; MG/1; [IU]/1; MG/1; MG/1; MG/1; MG/1; MG/1; MG/1; UG/1; MG/1; MG/1; MG/1; MG/1
27-1 TABLET ORAL DAILY
Qty: 30 | Refills: 11 | Status: ACTIVE | COMMUNITY
Start: 2022-04-25 | End: 1900-01-01

## 2022-04-25 NOTE — HISTORY OF PRESENT ILLNESS
[FreeTextEntry1] : 37 P1 for annual. Possibly wants to get pregnant next year.\par Sexually active - not using birth control. Regular periods.\par  [No] : Patient does not have concerns regarding sex [Currently Active] : currently active

## 2022-04-25 NOTE — PLAN
[FreeTextEntry1] : Normal Annual\par Pap/hpv done\par Mammo not indicated. Had one done in 2019 for breast pain - no more breast pain.\par I recommended that she attempt pregnancy now - her chances are better if she doesn't wait.\par Start prenatal vitamins.\par f/u 1 year or when pregnant.

## 2022-04-26 ENCOUNTER — LABORATORY RESULT (OUTPATIENT)
Age: 37
End: 2022-04-26

## 2022-04-26 ENCOUNTER — APPOINTMENT (OUTPATIENT)
Dept: HEMATOLOGY ONCOLOGY | Facility: CLINIC | Age: 37
End: 2022-04-26
Payer: MEDICAID

## 2022-04-26 ENCOUNTER — OUTPATIENT (OUTPATIENT)
Dept: OUTPATIENT SERVICES | Facility: HOSPITAL | Age: 37
LOS: 1 days | Discharge: HOME | End: 2022-04-26

## 2022-04-26 VITALS
RESPIRATION RATE: 14 BRPM | TEMPERATURE: 97.2 F | HEIGHT: 65 IN | OXYGEN SATURATION: 98 % | DIASTOLIC BLOOD PRESSURE: 69 MMHG | WEIGHT: 180 LBS | HEART RATE: 67 BPM | SYSTOLIC BLOOD PRESSURE: 89 MMHG | BODY MASS INDEX: 29.99 KG/M2

## 2022-04-26 DIAGNOSIS — E61.1 IRON DEFICIENCY: ICD-10-CM

## 2022-04-26 DIAGNOSIS — Z85.07 PERSONAL HISTORY OF MALIGNANT NEOPLASM OF PANCREAS: Chronic | ICD-10-CM

## 2022-04-26 LAB
HCT VFR BLD CALC: 36.3 %
HGB BLD-MCNC: 12.2 G/DL
MCHC RBC-ENTMCNC: 29.4 PG
MCHC RBC-ENTMCNC: 33.6 G/DL
MCV RBC AUTO: 87.5 FL
PLATELET # BLD AUTO: 275 K/UL
PMV BLD: 10 FL
RBC # BLD: 4.15 M/UL
RBC # FLD: 13.2 %
WBC # FLD AUTO: 7.21 K/UL

## 2022-04-26 PROCEDURE — 99213 OFFICE O/P EST LOW 20 MIN: CPT

## 2022-04-26 NOTE — PHYSICAL EXAM
[Fully active, able to carry on all pre-disease performance without restriction] : Status 0 - Fully active, able to carry on all pre-disease performance without restriction [Normal] : affect appropriate [de-identified] : no tenderness or redness. no palpable masses.

## 2022-04-26 NOTE — ASSESSMENT
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 \par - CT chest from 1/27/2021 revealed 2 mm subpleural RLL nodule, stable since 2018. Previously described RUL nodules not visualized. \par \par - Continue with  surveillance scans on yearly basis.Next one due Nov 2022. \par - Follow up CBC CMP Ca 19-9 and ferritin today \par -- Advised to see Genetic counsellor given family h/o bladder Ca, uterine cancer. \par \par - RTC in 6 months \par \par Patient seen and examined with Dr. Murray who agrees with plan of care.\par

## 2022-04-26 NOTE — HISTORY OF PRESENT ILLNESS
[de-identified] : \par 32 yo female , Lao speaking, born in [place] , interviewed with  # [id number]. Patient with  with no significant  significant medical history . S/p pancreaticoduodenectomy, pylorus-preserving R0 resection of  for pancreatic mass diagnosed when she presented with epigastric and periumbilical swelling for 3 months.  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  surgery complicated with a sepsis - possibly from aspiration pneumonia. \par type A pancreatic fistula and early resolution of delayed gastric emptying. She continues to have drainage 10 to 20cc daily . She denies fever, nausea, vomiting , abdominal pain or diarrhea. \par \par \par \par  [de-identified] : 06/14/2018 patient returns for follow up , she feels well , no abdominal pain or weight loss. no nausea or vomiting . Blood work was notable for mild normocytic anemia , Ferritin 38 . She denies hematochezia or heavy menses. \par \par 08/21/2018 : Patient returns for follow up , she is on iron one daily , Hb improved to 12. She complains of slight increase in epigastric discomfort , no nausea, vomiting , pruritis, change in bowel habits. no weight loss. \par \par 06/03/2019 Patient returns for history of pancreatic cancer s/p Whipple's with no evidence of disease. Iron deficiency anemia s/p venofer .She feels well and denies any GI symptoms . no weight loss, nausea vomiting . Recent blood work shows slightly elevated alkaline phosphatase .\par \par 01/27/2020 Patient returns with chief complaint of interscapular pain for 2 to 3 weeks , worse with activity and present at night . 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 She reports urinary frequency without dysuria , change in bowel habits , leg weakness or numbness. \par \par 02/10/2020\par Patient returns for history of pancreatic cancer s/p Whipple's with no evidence of disease. Ferritin of 8 noted on 01/27/2020. On venofer. Her interscapular pain is better with cyclobenzaprine. X ray did not reveal any fractures. She complains of increased urinary frequency. No fevers. \par Her menses are regular and normal. \par \par 6/11/2020: SHIELA VERMA is a 35 year old female here today for follow up visit for history of EDWIN and pancreatic cancer s/p Whipples. She feels well besdies mild fatigue. She denies any GI symptoms,  weight loss, nausea, vomiting, or hematochezia. she received Venofer 200mg IV x5 doses; last dose was Feb 20, 2020. She complains of urticaria around her areolas which started about a week ago. Last mammogram was May 2019 which showed no evidence for malignancy. CBC reviewed normocytic anemia noted. Will recheck iron studies and ferritin on 6/11/2020\par \par 9/17/2020: SHIELA VERMA is a 35 year old female here today for follow up visit for history of EDWIN and pancreatic cancer s/p Whipples. She feels well besides mild fatigue. She denies any GI symptoms,  weight loss, nausea, vomiting, or hematochezia. she received Venofer 200mg IV x5 doses; last dose was Feb 20, 2020. CT C/A/P was completed on 9/8/2020 CT A/P was negative for disease; post surgical changes from Whipple procedure. CT of chest showed : \par IMPRESSION:\par Since January 23, 2018\par Nonspecific 2 mm subpleural nodule in the right lower lobe which may represent a focus of scarring/atelectasis\par Scattered 2 mm nodules in the right upper lobe which may be postinflammatory in etiology.\par Patient denies cough at this time. She states that she does have mild SOB on exertion. \par \par 2/2/2021: Patient presents for follow up. Complains of difficulty breathing for several weeks. Upon further clarification, denies overt shortness of breath or cough. Has nasal congestion. Also complains of posterior neck and paraspinal upper back pain and sensation of warmth for about the same length of time. Denies nausea, vomiting, anorexia. She is asking to have her blood levels checked because of her history of iron deficiency (last received venofer in 2/2020). Her PMD checked labs in 12/2020, and she had hemoglobin of 12 with 16% saturation and ferritin of 25. Patient does not take oral iron.\par \par 05/04/2021: SHIELA is here for follow up visit for history of pancreatic cancer s/p Whipple procedure. In addition, she has EDWIN; not currently on iron supplement at this time. She denies weight loss, abdominal pain, increased fatigue, SOB, or hematochezia at this time. She does complain of increased nasal congestion. She was seen by ENT who started her on a course of doxycycline, steroids, and fluticasone. Posterior neck and paraspinal pain has improved. Bone scan was completed in March 2021 which was normal. \par \par 11/02/21\par Pt returns for f/u today . She reports feeling fine . Pt was admitted to hospital with headache and dizziness . She had MRI that showed : IMPRESSION:\par 1. No MRI evidence of intracranial metastatic disease. No acute infarct or intracranial hemorrhage.\par 2. Few scattered small/punctate foci of white matter signal abnormality which are nonspecific in etiology.\par Pt was seen by neurology and was given sumtriptan for migraines . Pt reports that she takes it on and off and her headaches have resolved . She denies any fever , chills, weight loss , loss of appetite and abdominal pain . Her sister was recently diagnosed with uterine mass ? and is being worked up by Gyn . Pt also reports that her mother had uterine cancer 20 years ago , and recently diagnosed with bladder cancer and had surgery done . Pt reports that she was seen by Gyn last year and everything was fine . \par \par 4/26/22- Patient is here for follow up visit. She is doing well with no new c/o abdominal pain, weight loss, OLESYA. Her migraines are better as well. No other new issues

## 2022-04-26 NOTE — REVIEW OF SYSTEMS
[Muscle Pain] : muscle pain [Negative] : Heme/Lymph [Fatigue] : no fatigue [Shortness Of Breath] : no shortness of breath [Cough] : no cough 37.1

## 2022-04-27 LAB
ALBUMIN SERPL ELPH-MCNC: 4.6 G/DL
ALP BLD-CCNC: 106 U/L
ALT SERPL-CCNC: 21 U/L
ANION GAP SERPL CALC-SCNC: 12 MMOL/L
AST SERPL-CCNC: 18 U/L
BILIRUB SERPL-MCNC: 0.5 MG/DL
BUN SERPL-MCNC: 21 MG/DL
CALCIUM SERPL-MCNC: 8.8 MG/DL
CANCER AG19-9 SERPL-ACNC: 11 U/ML
CHLORIDE SERPL-SCNC: 104 MMOL/L
CO2 SERPL-SCNC: 21 MMOL/L
CREAT SERPL-MCNC: 0.6 MG/DL
EGFR: 119 ML/MIN/1.73M2
FERRITIN SERPL-MCNC: 22 NG/ML
GLUCOSE SERPL-MCNC: 93 MG/DL
POTASSIUM SERPL-SCNC: 4.3 MMOL/L
PROT SERPL-MCNC: 7.4 G/DL
SODIUM SERPL-SCNC: 137 MMOL/L

## 2022-04-28 DIAGNOSIS — C80.1 MALIGNANT (PRIMARY) NEOPLASM, UNSPECIFIED: ICD-10-CM

## 2022-04-28 DIAGNOSIS — E61.1 IRON DEFICIENCY: ICD-10-CM

## 2022-04-28 LAB
HCG UR QL: NEGATIVE
HPV HIGH+LOW RISK DNA PNL CVX: NOT DETECTED
QUALITY CONTROL: YES

## 2022-05-01 LAB — CYTOLOGY CVX/VAG DOC THIN PREP: NORMAL

## 2022-05-18 ENCOUNTER — APPOINTMENT (OUTPATIENT)
Dept: NEUROLOGY | Facility: CLINIC | Age: 37
End: 2022-05-18

## 2022-05-24 ENCOUNTER — APPOINTMENT (OUTPATIENT)
Dept: INTERNAL MEDICINE | Facility: CLINIC | Age: 37
End: 2022-05-24

## 2022-06-28 ENCOUNTER — APPOINTMENT (OUTPATIENT)
Age: 37
End: 2022-06-28

## 2022-06-29 NOTE — DISCUSSION/SUMMARY
[FreeTextEntry1] : REASON FOR VISIT:\par Ms. Ce Alvarenga is a 37-year-old female who was referred by Dr. Jesus Murray for cancer genetic counseling and risk assessment due to a personal and family history of cancer. The patient was seen on 2022 through [facility]. The patient was unaccompanied. A  was used through [employer], ID#[id number]. \par \par RELEVANT MEDICAL AND SURGICAL HISTORY:\par The patient has a personal history of a solid pseudopapillary neoplasm of the pancreas, pT2, diagnosed in 2018. She reported going to the doctor because she felt a lump on her abdomen. She subsequently underwent a pancreaticoduodenectomy, pylorus-preserving R0 resection for the pancreatic mass. The patient presents today for a genetic evaluation given her personal and family history of cancer.\par \par OTHER MEDICAL AND SURGICAL HISTORY:\par • Dizziness, pt reported she had a CAT scan and a spot was noted on the brain. The patient is following  up with neurology. \par • Anemia\par \par PAST OB/GYN HISTORY:\par Obstetrical History: \par Age at Menarche: 14\par Pre-Menopausal\par Age at First Live Birth: 24\par Oral Contraceptive Use: Oral contraceptives for 3 months at age 19 and started using it again at the age of 23 for a year. IUD for 3 years.\par Hormone Replacement Therapy: None.\par \par CANCER SCREENING HISTORY: \par BREAST: Last mammogram was approximately 2 years ago after the patient went to her doctor for a routine visit and was sent for a mammogram.\par GYN: Last visit 2022. Frequency: annual. Patient reported abnormal PAP smears twice. Patient reported a subsequent biopsy was negative.\par Colon: None.\par Skin: None.\par \par SOCIAL HISTORY:\par • Tobacco-product use: None.\par \par FAMILY HISTORY:\par Paternal ancestry was reported as [place] and maternal ancestry was reported as [place]. A detailed family history of cancer was ascertained, see below for pedigree. Of note:\par • Mother with uterine cancer at the age of 40 and bladder cancer at the age of 60\par • Maternal aunt with uterine cancer at the age of 46\par • Maternal uncle with leukemia at 51\par • Paternal grandfather with stomach cancer at  75\par \par The remaining family history is unremarkable. According to the patient there are no other known cases of significant cancers in the family. To her knowledge no one else in the family has had germline testing for cancer susceptibility. \par 	\par RISK ASSESSMENT:\par Ms. 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 Variants in pancreatic and uterine cancer susceptibility genes were of specific concern. \par 	 \par We recommended genetic testing for a pancreatic and breast/gyn cancers panel. The patient also opted to add-on bladder/urinary tract, leukemia and gastric cancers panels. This test analyzes 71 genes: : ANKRD26, APC, DUYEN, BAP1, BARD1, BLM, BMPR1A, BRCA1, BRCA2, BRIP1, CBL, CDC73, CDH1, CDKN1C, CDKN2A, CEBPA, CHEK2, CTNNA1, DDX41, DICER1, DIS3L2, ELANE, EPCAM, UPLU7T4, ETV6, FH, FLCN, G6PC3, GATA2, GFI1, GPC3, HAX1, IKZF1, KIT, KRAS, MECOM, MEN1, MET, MLH1, MSH2, MSH6, NBN, NF1, PALB2, PDGFRA, PMS2, PTEN, PTPN11, RAD51C, RAD51D, REST, RTEL1, RUNX1, SAMD9, SAMD9L, SDHA, SDHB, SDHC, SDHD, SMAD4, SMARCA4, SMARCB1, SRP72, STK11, TERC, TERT, TP53, TSC1, TSC2, VHL, WT1\par \par We discussed the risks, benefits and limitations, financial responsibility and implications of genetic testing. We also discussed the psychosocial implications of genetic testing. Possible test results were reviewed with the patient, along with associated medical management options. The Genetic Information Non-discrimination Act (CHARLES) was also reviewed.\par \par The patient consented to the above-mentioned genetic testing panel. Blood was drawn in our clinic and will be sent to [facility] for analysis.\par \par PLAN:\par 1. Blood drawn will be sent to [facility] for analysis. \par 2. We will contact the patient once the results are available. Results generally return in 2-3 weeks.\par \par For any additional questions please call Cancer Genetics at [phone] or [phone].\par \par \par Katie Quintero MS, [facility]\par Genetic Counselor, Cancer Genetics\par \par \par

## 2022-07-12 ENCOUNTER — NON-APPOINTMENT (OUTPATIENT)
Age: 37
End: 2022-07-12

## 2022-07-20 ENCOUNTER — FORM ENCOUNTER (OUTPATIENT)
Age: 37
End: 2022-07-20

## 2022-07-26 ENCOUNTER — NON-APPOINTMENT (OUTPATIENT)
Age: 37
End: 2022-07-26

## 2022-07-26 NOTE — DISCUSSION/SUMMARY
[FreeTextEntry1] : Ms. Ce Alvarenga is a 37-year-old female who was called for a discussion regarding her uncertain genetic test results related to hereditary cancer predisposition. Patient was unable to be reached and a voicemail was left for her informing her the schedulers would be reaching out to her to schedule a follow-up appointment to review her results in more detail.  ID# [id number]. \par \par Genetic test results demonstrated variants of uncertain significance (VUS) in the following genes: \par APC c.4015G>T (p.Edp8602Tkf) \par BARD1 c.1161C>A (p.Gqa215Zdu) \par DICER1 c.77C>T (p.Wfh96Jzj) \par TSC2 c.655C>G (p.Cjp740Wcy)\par \par At this time, the available evidence is insufficient to determine the role of these variants in disease and the clinical significance of these results are uncertain. With more research, a VUS may be reclassified as either disease-causing or benign. \par \par We will attempt to contact the patient again to review these results with her.\par \par For any additional questions please call Cancer Genetics at [phone] or [phone].\par \par \par Katie Quintero MS, [facility] [place]\par Genetic Counselor, Cancer Genetics\par \par

## 2022-07-29 NOTE — ASSESSMENT
Staff spoke with pt to inform pt that his prescription was approved and he can contact his pharmacy. Pt verbalized understanding and confirmed sG   [FreeTextEntry1] : SHIELA VERMA is a 36 year old woman with history of pancreatic cancer s/p Whipple's procedure in 2018 had three day headache and dizziness which is resolved now. She was assessed by neurology in the ED. MRI brain w/wo showed no acute lesion, infarct or enhancing lesion. It showed nonspecific few scattered white matter spots. The headache episode is suggestive of migraine. I will start her on Imitrex nasal spray PRN and will repeat the MRI brain in 1 year. \par \par - Sumatriptan nasal PRN \par - Repeat MRI in one year \par - RTC in 6 months  hard copy, drawn during this pregnancy

## 2022-08-16 ENCOUNTER — APPOINTMENT (OUTPATIENT)
Age: 37
End: 2022-08-16

## 2022-08-16 ENCOUNTER — NON-APPOINTMENT (OUTPATIENT)
Age: 37
End: 2022-08-16

## 2022-09-04 ENCOUNTER — NON-APPOINTMENT (OUTPATIENT)
Age: 37
End: 2022-09-04

## 2022-09-12 ENCOUNTER — APPOINTMENT (OUTPATIENT)
Dept: OBGYN | Facility: CLINIC | Age: 37
End: 2022-09-12

## 2022-09-12 ENCOUNTER — OUTPATIENT (OUTPATIENT)
Dept: OUTPATIENT SERVICES | Facility: HOSPITAL | Age: 37
LOS: 1 days | Discharge: HOME | End: 2022-09-12

## 2022-09-12 VITALS — DIASTOLIC BLOOD PRESSURE: 76 MMHG | WEIGHT: 184 LBS | SYSTOLIC BLOOD PRESSURE: 110 MMHG | BODY MASS INDEX: 30.62 KG/M2

## 2022-09-12 DIAGNOSIS — R30.0 DYSURIA: ICD-10-CM

## 2022-09-12 DIAGNOSIS — Z85.07 PERSONAL HISTORY OF MALIGNANT NEOPLASM OF PANCREAS: Chronic | ICD-10-CM

## 2022-09-12 PROCEDURE — 99214 OFFICE O/P EST MOD 30 MIN: CPT

## 2022-09-13 NOTE — PHYSICAL EXAM
[Chaperone Present] : A chaperone was present in the examining room during all aspects of the physical examination [Appropriately responsive] : appropriately responsive [Alert] : alert [No Acute Distress] : no acute distress [Soft] : soft [Non-tender] : non-tender [Non-distended] : non-distended [No HSM] : No HSM [No Lesions] : no lesions [No Mass] : no mass

## 2022-09-13 NOTE — HISTORY OF PRESENT ILLNESS
[FreeTextEntry1] : Patient here for f/u\par She was seen in urgent care few days ago and was treated for UTI with macrobid.\par SHe is feeling better.\par She is concerned as she has had several UTIs in the past few months.\par

## 2022-09-13 NOTE — PLAN
[FreeTextEntry1] : I reviewed the urgent care entry. Her urine dip at that time was COMPLETELY NEGATIVE.\par It is unclear if she is truly having a UTI\par I recommended that she perform a UA/C&S now with GC/CHL via urine\par I also recommended that she complete her antibiotics.\par If she has a recurrent episode of burning on urination - I gave her another req to perform a ua/C&S BEFORE taking antibitoics.\par I also recommended that she make a f/u appointment with [facility] regarding her genetic results. I gave her a copy of her report and I explained that she has some genetic mutations that may or may not put her at increased risk of pancreatic/uterine/ovarian cancer. \par I also discussed her future child bearing plans. SHe is very concerned about her health given the pancreatic surgery she had. I recommended that if she would like to have another child - she should do it now rather than wait longer.\par

## 2022-09-20 LAB
C TRACH RRNA SPEC QL NAA+PROBE: NOT DETECTED
N GONORRHOEA RRNA SPEC QL NAA+PROBE: NOT DETECTED
SOURCE AMPLIFICATION: NORMAL

## 2022-10-03 LAB — BACTERIA UR CULT: ABNORMAL

## 2022-10-03 RX ORDER — NITROFURANTOIN (MONOHYDRATE/MACROCRYSTALS) 25; 75 MG/1; MG/1
100 CAPSULE ORAL
Qty: 14 | Refills: 0 | Status: ACTIVE | COMMUNITY
Start: 2022-10-03 | End: 1900-01-01

## 2022-10-05 ENCOUNTER — RESULT REVIEW (OUTPATIENT)
Age: 37
End: 2022-10-05

## 2022-10-05 ENCOUNTER — OUTPATIENT (OUTPATIENT)
Dept: OUTPATIENT SERVICES | Facility: HOSPITAL | Age: 37
LOS: 1 days | Discharge: HOME | End: 2022-10-05

## 2022-10-05 ENCOUNTER — APPOINTMENT (OUTPATIENT)
Dept: OBGYN | Facility: CLINIC | Age: 37
End: 2022-10-05

## 2022-10-05 VITALS — SYSTOLIC BLOOD PRESSURE: 118 MMHG | DIASTOLIC BLOOD PRESSURE: 74 MMHG | BODY MASS INDEX: 29.79 KG/M2 | WEIGHT: 179 LBS

## 2022-10-05 DIAGNOSIS — N63.0 UNSPECIFIED LUMP IN UNSPECIFIED BREAST: ICD-10-CM

## 2022-10-05 DIAGNOSIS — Z85.07 PERSONAL HISTORY OF MALIGNANT NEOPLASM OF PANCREAS: Chronic | ICD-10-CM

## 2022-10-05 PROCEDURE — 99213 OFFICE O/P EST LOW 20 MIN: CPT

## 2022-10-05 NOTE — PHYSICAL EXAM
[Chaperone Present] : A chaperone was present in the examining room during all aspects of the physical examination [Appropriately responsive] : appropriately responsive [Oriented x3] : oriented x3 [Examination Of The Breasts] : a normal appearance [Normal] : normal [Breast Mass Right Breast ___cm] : no was mass palpable [___cm] : a ~M [unfilled] ~Ucm mass was palpated deep to the nipple [FreeTextEntry1] : John

## 2022-10-05 NOTE — DISCUSSION/SUMMARY
[FreeTextEntry1] : 38yo here for follow up with breast mass.\par - feels benign on exam\par - L breast us/mammo

## 2022-10-05 NOTE — HISTORY OF PRESENT ILLNESS
[FreeTextEntry1] : Patient returns for follow up. LMP 2 days ago. She noticed a mass under her left nipple 5 days ago. She reports that it is tender to palpation. She has never felt something like this in her breast before. She denies nipple discharge. She denies fever, chills, unintended weight loss. She had a breast mammo/US done in 2019 that was BIRADS-1.

## 2022-10-12 ENCOUNTER — APPOINTMENT (OUTPATIENT)
Age: 37
End: 2022-10-12

## 2022-10-12 ENCOUNTER — NON-APPOINTMENT (OUTPATIENT)
Age: 37
End: 2022-10-12

## 2022-10-26 ENCOUNTER — RESULT REVIEW (OUTPATIENT)
Age: 37
End: 2022-10-26

## 2022-10-26 ENCOUNTER — NON-APPOINTMENT (OUTPATIENT)
Age: 37
End: 2022-10-26

## 2022-10-26 ENCOUNTER — OUTPATIENT (OUTPATIENT)
Dept: OUTPATIENT SERVICES | Facility: HOSPITAL | Age: 37
LOS: 1 days | Discharge: HOME | End: 2022-10-26
Payer: MEDICAID

## 2022-10-26 DIAGNOSIS — Z85.07 PERSONAL HISTORY OF MALIGNANT NEOPLASM OF PANCREAS: Chronic | ICD-10-CM

## 2022-10-26 DIAGNOSIS — N63.20 UNSPECIFIED LUMP IN THE LEFT BREAST, UNSPECIFIED QUADRANT: ICD-10-CM

## 2022-10-26 PROCEDURE — 76641 ULTRASOUND BREAST COMPLETE: CPT | Mod: 26,50

## 2022-10-26 PROCEDURE — G0279: CPT | Mod: 26

## 2022-10-26 PROCEDURE — 77066 DX MAMMO INCL CAD BI: CPT | Mod: 26

## 2022-10-28 ENCOUNTER — RESULT REVIEW (OUTPATIENT)
Age: 37
End: 2022-10-28

## 2022-10-28 ENCOUNTER — OUTPATIENT (OUTPATIENT)
Dept: OUTPATIENT SERVICES | Facility: HOSPITAL | Age: 37
LOS: 1 days | Discharge: HOME | End: 2022-10-28

## 2022-10-28 DIAGNOSIS — Z85.07 PERSONAL HISTORY OF MALIGNANT NEOPLASM OF PANCREAS: Chronic | ICD-10-CM

## 2022-10-28 PROCEDURE — 77065 DX MAMMO INCL CAD UNI: CPT | Mod: 26,LT

## 2022-10-28 PROCEDURE — 19083 BX BREAST 1ST LESION US IMAG: CPT | Mod: LT

## 2022-10-28 PROCEDURE — 88305 TISSUE EXAM BY PATHOLOGIST: CPT | Mod: 26

## 2022-10-31 LAB — SURGICAL PATHOLOGY STUDY: SIGNIFICANT CHANGE UP

## 2022-11-02 DIAGNOSIS — N60.02 SOLITARY CYST OF LEFT BREAST: ICD-10-CM

## 2022-11-08 ENCOUNTER — APPOINTMENT (OUTPATIENT)
Dept: HEMATOLOGY ONCOLOGY | Facility: CLINIC | Age: 37
End: 2022-11-08

## 2022-11-08 VITALS
SYSTOLIC BLOOD PRESSURE: 106 MMHG | WEIGHT: 177 LBS | DIASTOLIC BLOOD PRESSURE: 75 MMHG | TEMPERATURE: 97.9 F | BODY MASS INDEX: 29.49 KG/M2 | RESPIRATION RATE: 14 BRPM | OXYGEN SATURATION: 98 % | HEART RATE: 59 BPM | HEIGHT: 65 IN

## 2022-11-08 DIAGNOSIS — K86.89 OTHER SPECIFIED DISEASES OF PANCREAS: ICD-10-CM

## 2022-11-08 LAB
ALBUMIN SERPL ELPH-MCNC: 4.3 G/DL
ALP BLD-CCNC: 122 U/L
ALT SERPL-CCNC: 20 U/L
ANION GAP SERPL CALC-SCNC: 10 MMOL/L
AST SERPL-CCNC: 18 U/L
BASOPHILS # BLD AUTO: 0.02 K/UL
BASOPHILS NFR BLD AUTO: 0.3 %
BILIRUB SERPL-MCNC: 0.3 MG/DL
BUN SERPL-MCNC: 16 MG/DL
CALCIUM SERPL-MCNC: 9.4 MG/DL
CHLORIDE SERPL-SCNC: 103 MMOL/L
CO2 SERPL-SCNC: 24 MMOL/L
CREAT SERPL-MCNC: 0.5 MG/DL
EGFR: 125 ML/MIN/1.73M2
EOSINOPHIL # BLD AUTO: 0.28 K/UL
EOSINOPHIL NFR BLD AUTO: 3.7 %
GLUCOSE SERPL-MCNC: 92 MG/DL
HCT VFR BLD CALC: 33.8 %
HGB BLD-MCNC: 11.5 G/DL
IMM GRANULOCYTES NFR BLD AUTO: 0.1 %
LYMPHOCYTES # BLD AUTO: 2.65 K/UL
LYMPHOCYTES NFR BLD AUTO: 35.3 %
MAN DIFF?: NORMAL
MCHC RBC-ENTMCNC: 28.5 PG
MCHC RBC-ENTMCNC: 34 G/DL
MCV RBC AUTO: 83.7 FL
MONOCYTES # BLD AUTO: 0.6 K/UL
MONOCYTES NFR BLD AUTO: 8 %
NEUTROPHILS # BLD AUTO: 3.94 K/UL
NEUTROPHILS NFR BLD AUTO: 52.6 %
PLATELET # BLD AUTO: 297 K/UL
POTASSIUM SERPL-SCNC: 4.8 MMOL/L
PROT SERPL-MCNC: 7.4 G/DL
RBC # BLD: 4.04 M/UL
RBC # FLD: 13.5 %
SODIUM SERPL-SCNC: 137 MMOL/L
WBC # FLD AUTO: 7.5 K/UL

## 2022-11-08 PROCEDURE — 99213 OFFICE O/P EST LOW 20 MIN: CPT

## 2022-11-09 LAB
CANCER AG19-9 SERPL-ACNC: 12 U/ML
FERRITIN SERPL-MCNC: 11 NG/ML

## 2022-11-09 NOTE — REVIEW OF SYSTEMS
[Muscle Pain] : muscle pain [Negative] : Heme/Lymph [Fatigue] : no fatigue [Shortness Of Breath] : no shortness of breath [Cough] : no cough

## 2022-11-09 NOTE — HISTORY OF PRESENT ILLNESS
[de-identified] : \par 32 yo female , Faroese speaking, born in [place] , interviewed with  # [id number]. Patient with  with no significant  significant medical history . S/p pancreaticoduodenectomy, pylorus-preserving R0 resection of  for pancreatic mass diagnosed when she presented with epigastric and periumbilical swelling for 3 months.  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  surgery complicated with a sepsis - possibly from aspiration pneumonia. \par type A pancreatic fistula and early resolution of delayed gastric emptying. She continues to have drainage 10 to 20cc daily . She denies fever, nausea, vomiting , abdominal pain or diarrhea. \par \par \par \par  [de-identified] : 06/14/2018 patient returns for follow up , she feels well , no abdominal pain or weight loss. no nausea or vomiting . Blood work was notable for mild normocytic anemia , Ferritin 38 . She denies hematochezia or heavy menses. \par \par 08/21/2018 : Patient returns for follow up , she is on iron one daily , Hb improved to 12. She complains of slight increase in epigastric discomfort , no nausea, vomiting , pruritis, change in bowel habits. no weight loss. \par \par 06/03/2019 Patient returns for history of pancreatic cancer s/p Whipple's with no evidence of disease. Iron deficiency anemia s/p venofer .She feels well and denies any GI symptoms . no weight loss, nausea vomiting . Recent blood work shows slightly elevated alkaline phosphatase .\par \par 01/27/2020 Patient returns with chief complaint of interscapular pain for 2 to 3 weeks , worse with activity and present at night . 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 She reports urinary frequency without dysuria , change in bowel habits , leg weakness or numbness. \par \par 02/10/2020\par Patient returns for history of pancreatic cancer s/p Whipple's with no evidence of disease. Ferritin of 8 noted on 01/27/2020. On venofer. Her interscapular pain is better with cyclobenzaprine. X ray did not reveal any fractures. She complains of increased urinary frequency. No fevers. \par Her menses are regular and normal. \par \par 6/11/2020: SHIELA VERMA is a 35 year old female here today for follow up visit for history of EDWIN and pancreatic cancer s/p Whipples. She feels well besdies mild fatigue. She denies any GI symptoms,  weight loss, nausea, vomiting, or hematochezia. she received Venofer 200mg IV x5 doses; last dose was Feb 20, 2020. She complains of urticaria around her areolas which started about a week ago. Last mammogram was May 2019 which showed no evidence for malignancy. CBC reviewed normocytic anemia noted. Will recheck iron studies and ferritin on 6/11/2020\par \par 9/17/2020: SHIELA VERMA is a 35 year old female here today for follow up visit for history of EDWIN and pancreatic cancer s/p Whipples. She feels well besides mild fatigue. She denies any GI symptoms,  weight loss, nausea, vomiting, or hematochezia. she received Venofer 200mg IV x5 doses; last dose was Feb 20, 2020. CT C/A/P was completed on 9/8/2020 CT A/P was negative for disease; post surgical changes from Whipple procedure. CT of chest showed : \par IMPRESSION:\par Since January 23, 2018\par Nonspecific 2 mm subpleural nodule in the right lower lobe which may represent a focus of scarring/atelectasis\par Scattered 2 mm nodules in the right upper lobe which may be postinflammatory in etiology.\par Patient denies cough at this time. She states that she does have mild SOB on exertion. \par \par 2/2/2021: Patient presents for follow up. Complains of difficulty breathing for several weeks. Upon further clarification, denies overt shortness of breath or cough. Has nasal congestion. Also complains of posterior neck and paraspinal upper back pain and sensation of warmth for about the same length of time. Denies nausea, vomiting, anorexia. She is asking to have her blood levels checked because of her history of iron deficiency (last received venofer in 2/2020). Her PMD checked labs in 12/2020, and she had hemoglobin of 12 with 16% saturation and ferritin of 25. Patient does not take oral iron.\par \par 05/04/2021: SHIELA is here for follow up visit for history of pancreatic cancer s/p Whipple procedure. In addition, she has EDWIN; not currently on iron supplement at this time. She denies weight loss, abdominal pain, increased fatigue, SOB, or hematochezia at this time. She does complain of increased nasal congestion. She was seen by ENT who started her on a course of doxycycline, steroids, and fluticasone. Posterior neck and paraspinal pain has improved. Bone scan was completed in March 2021 which was normal. \par \par 11/02/21\par Pt returns for f/u today . She reports feeling fine . Pt was admitted to hospital with headache and dizziness . She had MRI that showed : IMPRESSION:\par 1. No MRI evidence of intracranial metastatic disease. No acute infarct or intracranial hemorrhage.\par 2. Few scattered small/punctate foci of white matter signal abnormality which are nonspecific in etiology.\par Pt was seen by neurology and was given sumtriptan for migraines . Pt reports that she takes it on and off and her headaches have resolved . She denies any fever , chills, weight loss , loss of appetite and abdominal pain . Her sister was recently diagnosed with uterine mass ? and is being worked up by Gyn . Pt also reports that her mother had uterine cancer 20 years ago , and recently diagnosed with bladder cancer and had surgery done . Pt reports that she was seen by Gyn last year and everything was fine . \par \par 4/26/22- Patient is here for follow up visit. She is doing well with no new c/o abdominal pain, weight loss, OLESYA. Her migraines are better as well. No other new issues \par \par 11/8/2022 Patient returns for follow up , she had negative biopsy of mammographically detected left breast lesion . She feels well and denies any abdominal pain , increased in girth , change in bowel habits.

## 2022-11-09 NOTE — ASSESSMENT
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 \par - CT chest from 1/27/2021 revealed 2 mm subpleural RLL nodule, stable since 2018. Previously described RUL nodules not visualized. \par \par -Negative genetic testing . \par _ negative left breast biopsy ( infected cyst ) \par \par Plan ; CT scan chest abdomen and pelvis .\par          cbc , cmp , ferritin , CA19.9 \par          considering child bearing ( has 14 year son )

## 2022-11-09 NOTE — PHYSICAL EXAM
[Fully active, able to carry on all pre-disease performance without restriction] : Status 0 - Fully active, able to carry on all pre-disease performance without restriction [Normal] : grossly intact [de-identified] : healed scar of surgery , no tenderness or redness. no palpable masses.  [de-identified] : mild tenderness over mid thoracic spine  [de-identified] : no focal deficits .

## 2022-11-11 ENCOUNTER — APPOINTMENT (OUTPATIENT)
Dept: SURGERY | Facility: CLINIC | Age: 37
End: 2022-11-11

## 2022-11-11 ENCOUNTER — NON-APPOINTMENT (OUTPATIENT)
Age: 37
End: 2022-11-11

## 2022-11-11 VITALS
SYSTOLIC BLOOD PRESSURE: 120 MMHG | WEIGHT: 176 LBS | TEMPERATURE: 97.7 F | HEIGHT: 65 IN | HEART RATE: 81 BPM | DIASTOLIC BLOOD PRESSURE: 80 MMHG | BODY MASS INDEX: 29.32 KG/M2 | OXYGEN SATURATION: 99 %

## 2022-11-11 PROCEDURE — 99212 OFFICE O/P EST SF 10 MIN: CPT

## 2022-11-11 NOTE — PHYSICAL EXAM
[Normal Breath Sounds] : Normal breath sounds [Normal Rate and Rhythm] : normal rate and rhythm [Normal Heart Sounds] : normal heart sounds [No Rash or Lesion] : No rash or lesion [Alert] : alert [Oriented to Person] : oriented to person [Oriented to Place] : oriented to place [Oriented to Time] : oriented to time [Calm] : calm [de-identified] : WDWNEEL [de-identified] : No masses [de-identified] : The breasts are symmetrical.  There are no skin changes or nipple discharge except for some slight ecchymosis of the left breast from the biopsy.  On the right there are no palpable masses.  On the left there is a small mass measuring maybe a centimeter with some nodularity around it likely from the biopsy and hematoma.  There is no cervical, supraclavicular, or axillary adenopathy bilaterally.

## 2022-11-11 NOTE — HISTORY OF PRESENT ILLNESS
[de-identified] : This patient presents with a left breast mass that she initially noted about 2 or 3 months ago.  She was worked up with imaging and a biopsy showing inflamed apocrine cyst.  There was no evidence of malignancy.  She reports that the mass has not grown, she has not had any pain or skin changes or nipple discharge.\par \par Breast risk factors: She had menarche at age 14, she had a child at age 24 did not breast-feed, she was on oral contraceptives for about 2 years many years ago.  She has no close family relatives with breast cancer and no history of prior breast procedures herself.

## 2022-11-16 ENCOUNTER — APPOINTMENT (OUTPATIENT)
Dept: INFUSION THERAPY | Facility: CLINIC | Age: 37
End: 2022-11-16

## 2022-11-16 RX ORDER — IRON SUCROSE 20 MG/ML
200 INJECTION, SOLUTION INTRAVENOUS ONCE
Refills: 0 | Status: COMPLETED | OUTPATIENT
Start: 2022-11-16 | End: 2022-11-16

## 2022-11-16 RX ADMIN — IRON SUCROSE 220 MILLIGRAM(S): 20 INJECTION, SOLUTION INTRAVENOUS at 14:40

## 2022-11-25 ENCOUNTER — APPOINTMENT (OUTPATIENT)
Dept: INFUSION THERAPY | Facility: CLINIC | Age: 37
End: 2022-11-25

## 2022-11-25 RX ORDER — IRON SUCROSE 20 MG/ML
200 INJECTION, SOLUTION INTRAVENOUS ONCE
Refills: 0 | Status: COMPLETED | OUTPATIENT
Start: 2022-11-25 | End: 2022-11-25

## 2022-11-25 RX ADMIN — IRON SUCROSE 110 MILLIGRAM(S): 20 INJECTION, SOLUTION INTRAVENOUS at 14:53

## 2022-11-25 RX ADMIN — IRON SUCROSE 200 MILLIGRAM(S): 20 INJECTION, SOLUTION INTRAVENOUS at 15:30

## 2022-11-30 ENCOUNTER — APPOINTMENT (OUTPATIENT)
Dept: INFUSION THERAPY | Facility: CLINIC | Age: 37
End: 2022-11-30

## 2022-11-30 RX ORDER — IRON SUCROSE 20 MG/ML
200 INJECTION, SOLUTION INTRAVENOUS ONCE
Refills: 0 | Status: COMPLETED | OUTPATIENT
Start: 2022-11-30 | End: 2022-11-30

## 2022-11-30 RX ADMIN — IRON SUCROSE 220 MILLIGRAM(S): 20 INJECTION, SOLUTION INTRAVENOUS at 16:45

## 2022-12-07 ENCOUNTER — APPOINTMENT (OUTPATIENT)
Dept: INFUSION THERAPY | Facility: CLINIC | Age: 37
End: 2022-12-07

## 2022-12-07 RX ORDER — IRON SUCROSE 20 MG/ML
200 INJECTION, SOLUTION INTRAVENOUS ONCE
Refills: 0 | Status: COMPLETED | OUTPATIENT
Start: 2022-12-07 | End: 2022-12-07

## 2022-12-07 RX ADMIN — IRON SUCROSE 220 MILLIGRAM(S): 20 INJECTION, SOLUTION INTRAVENOUS at 17:34

## 2022-12-14 ENCOUNTER — OUTPATIENT (OUTPATIENT)
Dept: OUTPATIENT SERVICES | Facility: HOSPITAL | Age: 37
LOS: 1 days | End: 2022-12-14

## 2022-12-14 ENCOUNTER — APPOINTMENT (OUTPATIENT)
Dept: INFUSION THERAPY | Facility: CLINIC | Age: 37
End: 2022-12-14

## 2022-12-14 DIAGNOSIS — Z85.07 PERSONAL HISTORY OF MALIGNANT NEOPLASM OF PANCREAS: Chronic | ICD-10-CM

## 2022-12-14 DIAGNOSIS — K86.89 OTHER SPECIFIED DISEASES OF PANCREAS: ICD-10-CM

## 2022-12-14 RX ORDER — IRON SUCROSE 20 MG/ML
200 INJECTION, SOLUTION INTRAVENOUS ONCE
Refills: 0 | Status: COMPLETED | OUTPATIENT
Start: 2022-12-14 | End: 2022-12-14

## 2022-12-14 RX ADMIN — IRON SUCROSE 220 MILLIGRAM(S): 20 INJECTION, SOLUTION INTRAVENOUS at 16:14

## 2022-12-29 NOTE — DISCUSSION/SUMMARY
[FreeTextEntry1] : REASON FOR VISIT:\par Ms. Alvarenga is a 37-year-old female who was seen on 10/12/2022 for a discussion regarding her uncertain genetic test results related to hereditary cancer predisposition.  Name: Emelia, ID#[id number]. \par \par RELEVANT MEDICAL AND SURGICAL HISTORY:\par The patient has a personal history of a solid pseudopapillary neoplasm of the pancreas, pT2, diagnosed in 2018. She reported going to the doctor because she felt a lump on her abdomen. She subsequently underwent a pancreaticoduodenectomy, pylorus-preserving R0 resection for the pancreatic mass. The patient presents today for a genetic evaluation given her personal and family history of cancer.\par \par She reported she felt a lump on her breast and is being sent for a mammogram on 10/27/2022. \par \par OTHER MEDICAL AND SURGICAL HISTORY:\par • Dizziness, pt reported she had a CAT scan and a spot was noted on the brain. The patient is following up with neurology. \par • Anemia\par \par PAST OB/GYN HISTORY:\par Obstetrical History: \par Age at Menarche: 14\par Pre-Menopausal\par Age at First Live Birth: 24\par Oral Contraceptive Use: Oral contraceptives for 3 months at age 19 and started using it again at the age of 23 for a year. IUD for 3 years.\par Hormone Replacement Therapy: None.\par \par CANCER SCREENING HISTORY: \par BREAST: Last mammogram was approximately 2 years ago after the patient went to her doctor for a routine visit and was sent for a mammogram.\par GYN: Last visit 2022. Frequency: annual. Patient reported abnormal PAP smears twice. Patient reported a subsequent biopsy was negative.\par Colon: None.\par Skin: None.\par \par SOCIAL HISTORY:\par • Tobacco-product use: None.\par \par FAMILY HISTORY:\par Paternal ancestry was reported as English and maternal ancestry was reported as English. A detailed family history of cancer was ascertained, see below for pedigree. Of note:\par • Mother with uterine cancer at the age of 40 and bladder cancer at the age of 60\par • Maternal aunt with uterine cancer at the age of 46\par • Maternal uncle with leukemia at 51\par • Paternal grandfather with stomach cancer at 75\par \par The remaining family history is unremarkable. According to the patient there are no other known cases of significant cancers in the family. To her knowledge no one else in the family has had germline testing for cancer susceptibility. \par \par TEST RESULTS: UNCERTAIN\par \par A variant of uncertain significance (VUS) was detected in the following gene(s):\par APC c.4015G>T (p.Jxw2890Shw) \par BARD1 c.1161C>A (p.Sed829Udl) \par DICER1 c.77C>T (p.Ebe86Dol) \par TSC2 c.655C>G (p.Jfu026Ozk)\par \par NO pathogenic (disease-causing) variants or variants of uncertain significance were detected in the following genes: ANKRD26, APC, DUYEN, BAP1, BARD1, BLM, BMPR1A, BRCA1, BRCA2, BRIP1, CBL, CDC73, CDH1, CDKN1C, CDKN2A, CEBPA, CHEK2, CTNNA1, DDX41, DICER1, DIS3L2, ELANE, EPCAM, MFIT6W8, ETV6, FH, FLCN, G6PC3, GATA2, GFI1, GPC3, HAX1, IKZF1, KIT, KRAS, MECOM, MEN1, MET, MLH1, MSH2, MSH6, NBN, NF1, PALB2, PDGFRA, PMS2, PTEN, PTPN11, RAD51C, RAD51D, REST, RTEL1, RUNX1, SAMD9, SAMD9L, SDHA, SDHB, SDHC, SDHD, SMAD4, SMARCA4, SMARCB1, SRP72, STK11, TERC, TERT, TP53, TSC1, TSC2, VHL, WT1\par \par \par RESULTS INTERPRETATION AND ASSESSMENT:\par At this time the available evidence is insufficient to determine the role of this variant in disease and the clinical significance of these results are uncertain. The APC gene is associated with autosomal dominant familial adenomatous polyposis (FAP) attenuated FAP (AFAP) and gastric adenocarcinoma and proximal polyposis of the stomach (GAPPS). The BARD1 gene is associated with autosomal dominant predisposition to breast cancer and possibly ovarian cancer and neuroblastoma. The DICER1 gene is associated with autosomal dominant DICER1-related pleuropulmonary blastoma familial tumor predisposition syndrome. The TSC2 gene is associated with autosomal dominant tuberous sclerosis complex (TSC). It is unknown if the patient has an increased risk for the cancers associated with APC, BARD1, DICER1 and TSC2.\par \par Of note, the patient denied any personal or FHx of seizures, ID or confetti lesions. Family lives in [place].\par \par The detection of these variants do NOT currently change the patient’s medical management. It is NOT recommended at this time that family members use this result for predictive genetic testing or medical management decisions. With more research, a VUS may be reclassified as either disease-causing or benign. The patient was encouraged to contact us every 2-3 years to enquire about any new information for this variant, or sooner if there are any changes in her personal or family history of cancer.  Such updates could possibly change our risk assessment and recommendations.\par \par Given Ms. Alvarenga’s current reported personal and family history of cancer, and her uncertain genetic test results, the following screening guidelines and risk-reducing recommendations were discussed:\par • PANCREAS: Long-term management and surveillance should be based on the patient’s on- or post-treatment protocol as recommended by her surgeons and/or oncologist.\par • OTHER: In the absence of other indications, the patient should practice age-appropriate cancer screening for all other organ systems as recommended for the general population.\par \par It is recommended that the patient discuss the importance of pursuing cancer genetic testing and counseling with her other relatives. There may be a pathogenic variant in the family which the patient did not inherit. We would be happy to meet with her family members or refer them to a genetic expert in their area.\par \par We also discussed the limitations of uncertain and negative results:\par 1. The cause of the patient’s history of cancer remains unknown. The cancer may have developed randomly, or due to environmental factors.  \par 2. This result does not completely rule out a hereditary basis for the reported history due to limitations in technology or a variant being present in an unidentified gene. \par 3. Variants in other genes would not be identified by this analysis, so this result does not rule out the low likelihood of having a mutation in a different hereditary cancer gene or the possibility of ever developing cancer.\par 4. It is possible there is a hereditary cancer predisposition gene mutation in the family, but the patient did not inherit it. \par \par Our knowledge of genetics and inherited cancer conditions is changing rapidly, therefore, we recommend that the patient contact our office, every 2 to 3 years, to discuss relevant advances in cancer genetics. We emphasize the importance of re-contacting us with updates regarding her personal and family history of cancer as well as any updates regarding additional cancer genetic test results performed for the patient and/or family members.  Such updates could possibly change our risk assessment and recommendations. \par \par PLAN:\par 1. Long-term management and surveillance should be based on the patient’s personal and family history and general population guidelines for all other cancers.\par 2. A copy of the genetic test results were released to the patient.\par 3. The patient was encouraged to contact us every 2-3 years to discuss relevant advances in cancer genetics, or sooner if there are any changes in her personal or family history of cancer.\par \par For any additional questions please call Cancer Genetics at [phone] or [phone].\par \par \par Katie Quintero MS, [facility] [place]\par Genetic Counselor, Cancer Genetics\par \par

## 2023-01-09 ENCOUNTER — APPOINTMENT (OUTPATIENT)
Dept: OBGYN | Facility: CLINIC | Age: 38
End: 2023-01-09

## 2023-01-11 ENCOUNTER — APPOINTMENT (OUTPATIENT)
Dept: HEMATOLOGY ONCOLOGY | Facility: CLINIC | Age: 38
End: 2023-01-11

## 2023-01-19 ENCOUNTER — APPOINTMENT (OUTPATIENT)
Dept: HEMATOLOGY ONCOLOGY | Facility: CLINIC | Age: 38
End: 2023-01-19

## 2023-01-25 ENCOUNTER — APPOINTMENT (OUTPATIENT)
Dept: OPHTHALMOLOGY | Facility: CLINIC | Age: 38
End: 2023-01-25
Payer: MEDICAID

## 2023-01-25 ENCOUNTER — OUTPATIENT (OUTPATIENT)
Dept: OUTPATIENT SERVICES | Facility: HOSPITAL | Age: 38
LOS: 1 days | Discharge: HOME | End: 2023-01-25

## 2023-01-25 DIAGNOSIS — Z85.07 PERSONAL HISTORY OF MALIGNANT NEOPLASM OF PANCREAS: Chronic | ICD-10-CM

## 2023-01-25 PROCEDURE — 92004 COMPRE OPH EXAM NEW PT 1/>: CPT

## 2023-01-25 PROCEDURE — 92285 EXTERNAL OCULAR PHOTOGRAPHY: CPT | Mod: 26

## 2023-01-26 DIAGNOSIS — H11.153 PINGUECULA, BILATERAL: ICD-10-CM

## 2023-01-26 DIAGNOSIS — H04.123 DRY EYE SYNDROME OF BILATERAL LACRIMAL GLANDS: ICD-10-CM

## 2023-01-26 DIAGNOSIS — D31.01 BENIGN NEOPLASM OF RIGHT CONJUNCTIVA: ICD-10-CM

## 2023-03-03 ENCOUNTER — APPOINTMENT (OUTPATIENT)
Dept: INTERNAL MEDICINE | Facility: CLINIC | Age: 38
End: 2023-03-03

## 2023-03-04 ENCOUNTER — EMERGENCY (EMERGENCY)
Facility: HOSPITAL | Age: 38
LOS: 0 days | Discharge: HOME | End: 2023-03-04
Attending: EMERGENCY MEDICINE
Payer: MEDICAID

## 2023-03-04 VITALS
SYSTOLIC BLOOD PRESSURE: 132 MMHG | OXYGEN SATURATION: 99 % | DIASTOLIC BLOOD PRESSURE: 96 MMHG | HEART RATE: 77 BPM | TEMPERATURE: 99 F | RESPIRATION RATE: 18 BRPM | WEIGHT: 182.98 LBS

## 2023-03-04 VITALS
TEMPERATURE: 98 F | DIASTOLIC BLOOD PRESSURE: 66 MMHG | HEART RATE: 62 BPM | RESPIRATION RATE: 18 BRPM | OXYGEN SATURATION: 98 % | SYSTOLIC BLOOD PRESSURE: 117 MMHG

## 2023-03-04 DIAGNOSIS — R42 DIZZINESS AND GIDDINESS: ICD-10-CM

## 2023-03-04 DIAGNOSIS — H53.8 OTHER VISUAL DISTURBANCES: ICD-10-CM

## 2023-03-04 DIAGNOSIS — Z85.07 PERSONAL HISTORY OF MALIGNANT NEOPLASM OF PANCREAS: Chronic | ICD-10-CM

## 2023-03-04 DIAGNOSIS — Z85.07 PERSONAL HISTORY OF MALIGNANT NEOPLASM OF PANCREAS: ICD-10-CM

## 2023-03-04 DIAGNOSIS — Z86.2 PERSONAL HISTORY OF DISEASES OF THE BLOOD AND BLOOD-FORMING ORGANS AND CERTAIN DISORDERS INVOLVING THE IMMUNE MECHANISM: ICD-10-CM

## 2023-03-04 DIAGNOSIS — R51.9 HEADACHE, UNSPECIFIED: ICD-10-CM

## 2023-03-04 DIAGNOSIS — K21.9 GASTRO-ESOPHAGEAL REFLUX DISEASE WITHOUT ESOPHAGITIS: ICD-10-CM

## 2023-03-04 LAB
ALBUMIN SERPL ELPH-MCNC: 4.3 G/DL — SIGNIFICANT CHANGE UP (ref 3.5–5.2)
ALP SERPL-CCNC: 106 U/L — SIGNIFICANT CHANGE UP (ref 30–115)
ALT FLD-CCNC: 23 U/L — SIGNIFICANT CHANGE UP (ref 0–41)
ANION GAP SERPL CALC-SCNC: 10 MMOL/L — SIGNIFICANT CHANGE UP (ref 7–14)
AST SERPL-CCNC: 18 U/L — SIGNIFICANT CHANGE UP (ref 0–41)
BASOPHILS # BLD AUTO: 0.02 K/UL — SIGNIFICANT CHANGE UP (ref 0–0.2)
BASOPHILS NFR BLD AUTO: 0.3 % — SIGNIFICANT CHANGE UP (ref 0–1)
BILIRUB SERPL-MCNC: 0.4 MG/DL — SIGNIFICANT CHANGE UP (ref 0.2–1.2)
BUN SERPL-MCNC: 13 MG/DL — SIGNIFICANT CHANGE UP (ref 10–20)
CALCIUM SERPL-MCNC: 8.9 MG/DL — SIGNIFICANT CHANGE UP (ref 8.4–10.5)
CHLORIDE SERPL-SCNC: 104 MMOL/L — SIGNIFICANT CHANGE UP (ref 98–110)
CO2 SERPL-SCNC: 23 MMOL/L — SIGNIFICANT CHANGE UP (ref 17–32)
CREAT SERPL-MCNC: 0.5 MG/DL — LOW (ref 0.7–1.5)
CRP SERPL-MCNC: <3 MG/L — SIGNIFICANT CHANGE UP
EGFR: 124 ML/MIN/1.73M2 — SIGNIFICANT CHANGE UP
EOSINOPHIL # BLD AUTO: 0.22 K/UL — SIGNIFICANT CHANGE UP (ref 0–0.7)
EOSINOPHIL NFR BLD AUTO: 3.5 % — SIGNIFICANT CHANGE UP (ref 0–8)
ERYTHROCYTE [SEDIMENTATION RATE] IN BLOOD: 7 MM/HR — SIGNIFICANT CHANGE UP (ref 0–20)
GLUCOSE SERPL-MCNC: 116 MG/DL — HIGH (ref 70–99)
HCG SERPL-ACNC: <1 MIU/ML — SIGNIFICANT CHANGE UP
HCT VFR BLD CALC: 38.5 % — SIGNIFICANT CHANGE UP (ref 37–47)
HGB BLD-MCNC: 12.9 G/DL — SIGNIFICANT CHANGE UP (ref 12–16)
IMM GRANULOCYTES NFR BLD AUTO: 0.3 % — SIGNIFICANT CHANGE UP (ref 0.1–0.3)
LYMPHOCYTES # BLD AUTO: 1.8 K/UL — SIGNIFICANT CHANGE UP (ref 1.2–3.4)
LYMPHOCYTES # BLD AUTO: 28.3 % — SIGNIFICANT CHANGE UP (ref 20.5–51.1)
MAGNESIUM SERPL-MCNC: 1.8 MG/DL — SIGNIFICANT CHANGE UP (ref 1.8–2.4)
MCHC RBC-ENTMCNC: 29.5 PG — SIGNIFICANT CHANGE UP (ref 27–31)
MCHC RBC-ENTMCNC: 33.5 G/DL — SIGNIFICANT CHANGE UP (ref 32–37)
MCV RBC AUTO: 88.1 FL — SIGNIFICANT CHANGE UP (ref 81–99)
MONOCYTES # BLD AUTO: 0.51 K/UL — SIGNIFICANT CHANGE UP (ref 0.1–0.6)
MONOCYTES NFR BLD AUTO: 8 % — SIGNIFICANT CHANGE UP (ref 1.7–9.3)
NEUTROPHILS # BLD AUTO: 3.78 K/UL — SIGNIFICANT CHANGE UP (ref 1.4–6.5)
NEUTROPHILS NFR BLD AUTO: 59.6 % — SIGNIFICANT CHANGE UP (ref 42.2–75.2)
NRBC # BLD: 0 /100 WBCS — SIGNIFICANT CHANGE UP (ref 0–0)
PLATELET # BLD AUTO: 273 K/UL — SIGNIFICANT CHANGE UP (ref 130–400)
POTASSIUM SERPL-MCNC: 4.3 MMOL/L — SIGNIFICANT CHANGE UP (ref 3.5–5)
POTASSIUM SERPL-SCNC: 4.3 MMOL/L — SIGNIFICANT CHANGE UP (ref 3.5–5)
PROT SERPL-MCNC: 7.2 G/DL — SIGNIFICANT CHANGE UP (ref 6–8)
RBC # BLD: 4.37 M/UL — SIGNIFICANT CHANGE UP (ref 4.2–5.4)
RBC # FLD: 13.6 % — SIGNIFICANT CHANGE UP (ref 11.5–14.5)
SODIUM SERPL-SCNC: 137 MMOL/L — SIGNIFICANT CHANGE UP (ref 135–146)
WBC # BLD: 6.35 K/UL — SIGNIFICANT CHANGE UP (ref 4.8–10.8)
WBC # FLD AUTO: 6.35 K/UL — SIGNIFICANT CHANGE UP (ref 4.8–10.8)

## 2023-03-04 PROCEDURE — 70450 CT HEAD/BRAIN W/O DYE: CPT | Mod: MA

## 2023-03-04 PROCEDURE — 85652 RBC SED RATE AUTOMATED: CPT

## 2023-03-04 PROCEDURE — 85025 COMPLETE CBC W/AUTO DIFF WBC: CPT

## 2023-03-04 PROCEDURE — 70496 CT ANGIOGRAPHY HEAD: CPT | Mod: MA

## 2023-03-04 PROCEDURE — 93005 ELECTROCARDIOGRAM TRACING: CPT

## 2023-03-04 PROCEDURE — 36415 COLL VENOUS BLD VENIPUNCTURE: CPT

## 2023-03-04 PROCEDURE — 93010 ELECTROCARDIOGRAM REPORT: CPT

## 2023-03-04 PROCEDURE — 83735 ASSAY OF MAGNESIUM: CPT

## 2023-03-04 PROCEDURE — 76529 ECHO EXAM OF EYE: CPT | Mod: LT,RT

## 2023-03-04 PROCEDURE — 70498 CT ANGIOGRAPHY NECK: CPT | Mod: MA

## 2023-03-04 PROCEDURE — 70498 CT ANGIOGRAPHY NECK: CPT | Mod: 26,MA

## 2023-03-04 PROCEDURE — 70496 CT ANGIOGRAPHY HEAD: CPT | Mod: 26,MA

## 2023-03-04 PROCEDURE — 82962 GLUCOSE BLOOD TEST: CPT

## 2023-03-04 PROCEDURE — 80053 COMPREHEN METABOLIC PANEL: CPT

## 2023-03-04 PROCEDURE — 84702 CHORIONIC GONADOTROPIN TEST: CPT

## 2023-03-04 PROCEDURE — 86140 C-REACTIVE PROTEIN: CPT

## 2023-03-04 PROCEDURE — 99285 EMERGENCY DEPT VISIT HI MDM: CPT | Mod: 25

## 2023-03-04 PROCEDURE — 76512 OPH US DX B-SCAN: CPT | Mod: 26

## 2023-03-04 RX ORDER — SODIUM CHLORIDE 9 MG/ML
1000 INJECTION, SOLUTION INTRAVENOUS ONCE
Refills: 0 | Status: COMPLETED | OUTPATIENT
Start: 2023-03-04 | End: 2023-03-04

## 2023-03-04 RX ADMIN — SODIUM CHLORIDE 1000 MILLILITER(S): 9 INJECTION, SOLUTION INTRAVENOUS at 13:27

## 2023-03-04 NOTE — ED PROVIDER NOTE - OBJECTIVE STATEMENT
39 yo F with PMH of pancreatic cancer s/p whipple 5 years ago (follows Dr. Murray, last 3 months ago), GERD presenting for 1 min of blurry vision associated with posterior headache that occurred at 6 PM last night while patient was driving. Denies dizziness, drug/alcohol use, n/w/t, fever, cough, congestion, CP, SOB, NVD, dysuria.

## 2023-03-04 NOTE — ED ADULT TRIAGE NOTE - CHIEF COMPLAINT QUOTE
Pt complaining of pain to the back of the head, dizziness, and vision loss from right eye last night around 8pm for 5 minutes. Pt denies symptoms currently. Pt complains of "heaviness and very mild pain" to the back of the head in triage. bgl 123. [facility]

## 2023-03-04 NOTE — ED PROVIDER NOTE - PROGRESS NOTE DETAILS
ED Attending ALYSSA Pop  pt reports feeling much better, nihss 0, aware of all results, including incidental findings, Supportive care and home care discussed in detail. Patient aware they may have to return for re-evaluation and possible admission if outpatient treatment fails. Strict return precautions discussed. pt comfortable with out pt follow. Dr Weber also translated for pt was preferred by pt, will follow up with pmd and neurology as discussed.

## 2023-03-04 NOTE — ED PROVIDER NOTE - PATIENT PORTAL LINK FT
You can access the FollowMyHealth Patient Portal offered by [facility] by registering at the following website: [email or web address]. By joining [employer]’s FollowMyHealth portal, you will also be able to view your health information using other applications (apps) compatible with our system.

## 2023-03-04 NOTE — ED ADULT NURSE NOTE - CHIEF COMPLAINT QUOTE
Pt complaining of pain to the back of the head, dizziness, and vision loss from right eye last night around 8pm for 5 minutes. Pt denies symptoms currently. Pt complains of "heaviness and very mild pain" to the back of the head in triage. bgl 123. [facility]

## 2023-03-04 NOTE — ED PROVIDER NOTE - NSFOLLOWUPINSTRUCTIONS_ED_ALL_ED_FT
Nuestros coordinadores de referencias del departamento de emergencias se comunicarán con usted en las próximas 24 a  48 horas de 9:00 a. m. a 5:00 p. m. (de lunes a viernes) con allison cristobal de seguimiento con ophthalmología. Espere allison llamada telefónica del hospital en edna período de tiempo. Si no recibe allison llamada o si tiene alguna pregunta o inquietud, puede comunicarse con miguelos al [phone].  TAMBIEN, USTED NECESITA HACER ALLISON CRISTOBAL CON UN NEUROLOGO (NOMBRE ABAJO).           Contact a health care provider if:    Your symptoms do not improve or they get worse.  You have:  New symptoms.  A headache.  Trouble seeing at night.  Trouble noticing the difference between colors.    You notice:    Drooping of your eyelids.  Drainage coming from your eyes.  A rash around your eyes.    Get help right away if:    You have:  Severe eye pain.  A severe headache.  A sudden change in vision.  A sudden loss of vision.  A vision change after an injury.  You notice flashing lights in your field of vision. Your field of vision is the area that you can see without moving your eyes.      Dolor de virgen    Un dolor de virgen es un dolor o molestia que se siente alrededor del área de la virgen o el keyshawn. Es posible que no se encuentre la causa específica de un dolor de virgen, ya que hay muchos tipos, incluidos los johan de virgen por tensión, los johan de virgen por migraña y los johan de virgen en racimo. Shalini houston condición para cualquier cambio. Las cosas que puede hacer para controlar [place] dolor incluyen kyle medicamentos de venta obdulia y recetados según las instrucciones de [place] proveedor de atención médica, recostarse en allison habitación oscura y tranquila, limitar el estrés, dormir con regularidad y abstenerse de consumir alcohol y productos de tabaco.    BUSQUE ATENCIÓN MÉDICA INMEDIATA SI TIENE ALGUNO DE LOS SIGUIENTES SÍNTOMAS: fiebre, vómitos, rigidez en el keyshawn, pérdida de la visión, problemas con el habla, debilidad muscular, pérdida del equilibrio, dificultad para caminar, desmayo o confusión.    Visión borrosa, adulto       Tener visión borrosa significa que no puede rayshawn las cosas con claridad. [place] visión puede parecer borrosa o fuera de foco. Puede involucrar [place] visión de objetos que están cerca o lejos. Puede afectar daljit o ambos ojos. Hay muchas causas de la visión borrosa, incluidas las cataratas, la degeneración macular, la inflamación ocular (uveítis) y la retinopatía diabética.    En muchos casos, la visión borrosa tiene que rayshawn con la forma de tu zeferino. Allison forma anormal del zeferino significa que no puede enfocar dany (error de refracción). Cuando esto sucede, puede causar:    Los objetos lejanos se xochilt borrosos (miopía).  Ana María los objetos para que se vean borrosos (hipermetropía).  Visión borrosa a cualquier distancia (astigmatismo).  Los errores de refracción a menudo se corrigen con anteojos o lentes de contacto.      La visión borrosa se puede diagnosticar según sarah síntomas y un examen físico. Informe a [place] proveedor de atención médica sobre cualquier otro problema de vanessa que tenga, cualquier lesión ocular reciente y cualquier cirugía anterior. Es posible que deba rayshawn a un proveedor de atención médica que se especialice en problemas oculares (oftalmólogo). [place] tratamiento dependerá de la causa de [place] visión borrosa.      Siga estas instrucciones en casa:    Asista a todas las visitas de seguimiento según lo indicado por [place]ouston proveedor de atención médica. [place] es importante. Estos incluyen cualquier visita a sarah especialistas de la vista.  No conduzca ni utilice maquinaria pesada si [place] visión es borrosa.  Use gotas para los ojos solo eduardo se lo indique houston proveedor de atención médica.  Si le recetaron anteojos o lentes de contacto, use los anteojos o los lentes de contacto según lo indique houston proveedor de atención médica.  Programe exámenes de la vista con regularidad.  Preste atención a cualquier cambio en sarah síntomas.    Resumen    Tener visión borrosa significa que no puede rayshawn las cosas con claridad. [place] visión puede parecer borrosa o fuera de foco.  Hay muchas causas de la visión borrosa. En muchos casos, la visión borrosa tiene que rayshawn con allison forma anormal del zeferino (error de refracción), y se puede corregir con anteojos o lentes de contacto.  Preste atención a cualquier cambio en sarah síntomas. Comuníquese con un proveedor de atención médica si sarah síntomas no mejoran o si tiene algún síntoma nuevo.  Esta información no pretende reemplazar los consejos que le brinde [place] proveedor de atención médica. Asegúrese de discutir cualquier pregunta que tenga con [place] proveedor de atención médica.

## 2023-03-04 NOTE — ED PROVIDER NOTE - ATTENDING CONTRIBUTION TO CARE
38-year-old female with past medical history of Whipple procedure approximately 5 years ago follows with Dr. Barney last seen in December, anemia status post iron infusions last received in December, presents with occipital head pressure.  Patient reports head pressure started 2 weeks ago was mild nonradiating not sudden, not thunderclap.  Excedrin with relief but then noticed the head pressure returned yesterday while she was driving associated with blurry vision and dizziness more like lightheadedness.  Patient reports she has had a work-up for dizziness a year ago including a CT head and was told there was an abnormality "spot "and followed up with a neurologist but does not know the name.  Patient reports she works 2 jobs and has been not sleeping well.  No suicidal homicidal ideations, no visual auditory hallucinations.  Last menstrual period February 10.  denies fever, chills, n/v, cp, sob, pleuritic cp, palpitations, diaphoresis, cough, tinnitus, ear pain, hearing loss, neck pain/stiffness, back pain, phonophobia, blurry vision/visual changes, abd pain, diarrhea, constipation, melena/brbpr, urinary symptoms, weakness, numbness/tingling, HA, syncope, sick contacts, recent travel or rash.     on exam:   Constitutional: non toxic appearing pt sitting on stretcher in nad.  Skin: no rash, no signs of trauma:  HEENT: PERRL, EOM intact, no nystagmus, mmm. No tongue deviation.   NECK and BACK: neck supple, no spinous ttp to neck or back, FROM, no palpable shelves or step offs, no meningeal signs.  CARDIO: RRR, radial pulses 2/4 b/l, dp and pt pulses 2/4 b/l.  Lungs: Ctabl w/ breath sounds present b/l, no wheezing or crackles, no accessory muscle use, no tachypnea, no stridor  ABD: BS present throughout all 4 quadrants, abd soft, nd, nt, no rebound tenderness or guarding, no cvat,;  EXT: FROM of upper and lower ext, no drift, no calf pain/swelling/erythema.  NEURO: AAOx3. Motor 5/5 and sensation intact throughout upper and lower ext. CN II-XII intact. No facial droop or slurring of speech. (-) Pronator (-) Romberg, no dysmetria w/ ftn or rapid alternating fine movements, heel to shin intact, ambulating with no ataxia or difficulty. NIHSS O.    Plan: Monitor, EKG, labs, ivf, imaging, reassess.

## 2023-03-04 NOTE — ED PROVIDER NOTE - CLINICAL SUMMARY MEDICAL DECISION MAKING FREE TEXT BOX
38-year-old female with past medical history of Whipple procedure approximately 5 years ago follows with Dr. Barney last seen in December, anemia status post iron infusions last received in December, presents with occipital head pressure.  Patient reports head pressure started 2 weeks ago was mild nonradiating not sudden, not thunderclap.  Excedrin with relief but then noticed the head pressure returned yesterday while she was driving associated with blurry vision and dizziness more like lightheadedness.  Patient reports she has had a work-up for dizziness a year ago including a CT head and was told there was an abnormality "spot "and followed up with a neurologist but does not know the name.  Patient reports she works 2 jobs and has been not sleeping well.  No suicidal homicidal ideations, no visual auditory hallucinations.  Last menstrual period February 10.  denies fever, chills, n/v, cp, sob, pleuritic cp, palpitations, diaphoresis, cough, tinnitus, ear pain, hearing loss, neck pain/stiffness, back pain, phonophobia, blurry vision/visual changes, abd pain, diarrhea, constipation, melena/brbpr, urinary symptoms, weakness, numbness/tingling, HA, syncope, sick contacts, recent travel or rash.     on exam:   Constitutional: non toxic appearing pt sitting on stretcher in nad.  Skin: no rash, no signs of trauma:  HEENT: PERRL, EOM intact, no nystagmus, mmm. No tongue deviation.   NECK and BACK: neck supple, no spinous ttp to neck or back, FROM, no palpable shelves or step offs, no meningeal signs.  CARDIO: RRR, radial pulses 2/4 b/l, dp and pt pulses 2/4 b/l.  Lungs: Ctabl w/ breath sounds present b/l, no wheezing or crackles, no accessory muscle use, no tachypnea, no stridor  ABD: BS present throughout all 4 quadrants, abd soft, nd, nt, no rebound tenderness or guarding, no cvat,;  EXT: FROM of upper and lower ext, no drift, no calf pain/swelling/erythema.  NEURO: AAOx3. Motor 5/5 and sensation intact throughout upper and lower ext. CN II-XII intact. No facial droop or slurring of speech. (-) Pronator (-) Romberg, no dysmetria w/ ftn or rapid alternating fine movements, heel to shin intact, ambulating with no ataxia or difficulty. NIHSS O.    Plan: Monitor, EKG, labs, ivf, imaging, reassess.    Labs and EKG were ordered and reviewed.  Imaging was ordered and reviewed by me.  Appropriate medications for patient's presenting complaints were ordered and effects were reassessed.  Patient's records (prior hospital, ED visit) were reviewed.  Escalation to admission/observation was considered. 38-year-old female with past medical history of Whipple procedure approximately 5 years ago follows with Dr. Barney last seen in December, anemia status post iron infusions last received in December, presents with occipital head pressure.  Patient reports head pressure started 2 weeks ago was mild nonradiating not sudden, not thunderclap.  Excedrin with relief but then noticed the head pressure returned yesterday while she was driving associated with blurry vision and dizziness more like lightheadedness.  Patient reports she has had a work-up for dizziness a year ago including a CT head and was told there was an abnormality "spot "and followed up with a neurologist but does not know the name.  Patient reports she works 2 jobs and has been not sleeping well.  No suicidal homicidal ideations, no visual auditory hallucinations.  Last menstrual period February 10.  denies fever, chills, n/v, cp, sob, pleuritic cp, palpitations, diaphoresis, cough, tinnitus, ear pain, hearing loss, neck pain/stiffness, back pain, phonophobia, blurry vision/visual changes, abd pain, diarrhea, constipation, melena/brbpr, urinary symptoms, weakness, numbness/tingling, HA, syncope, sick contacts, recent travel or rash.     on exam:   Constitutional: non toxic appearing pt sitting on stretcher in nad.  Skin: no rash, no signs of trauma:  HEENT: PERRL, EOM intact, no nystagmus, mmm. No tongue deviation.   NECK and BACK: neck supple, no spinous ttp to neck or back, FROM, no palpable shelves or step offs, no meningeal signs.  CARDIO: RRR, radial pulses 2/4 b/l, dp and pt pulses 2/4 b/l.  Lungs: Ctabl w/ breath sounds present b/l, no wheezing or crackles, no accessory muscle use, no tachypnea, no stridor  ABD: BS present throughout all 4 quadrants, abd soft, nd, nt, no rebound tenderness or guarding, no cvat,;  EXT: FROM of upper and lower ext, no drift, no calf pain/swelling/erythema.  NEURO: AAOx3. Motor 5/5 and sensation intact throughout upper and lower ext. CN II-XII intact. No facial droop or slurring of speech. (-) Pronator (-) Romberg, no dysmetria w/ ftn or rapid alternating fine movements, heel to shin intact, ambulating with no ataxia or difficulty. NIHSS O.    Plan: Monitor, EKG, labs, ivf, imaging, reassess.    Labs and EKG were ordered and reviewed.  Imaging was ordered and reviewed by me.  Appropriate medications for patient's presenting complaints were ordered and effects were reassessed.  Patient's records (prior hospital, ED visit) were reviewed.  Escalation to admission/observation was considered. However patient feels much better and is comfortable with discharge.  Appropriate follow-up was arranged. pt reports feeling much better, nihss 0, aware of all results, including incidental findings, Supportive care and home care discussed in detail. Patient aware they may have to return for re-evaluation and possible admission if outpatient treatment fails. Strict return precautions discussed. pt comfortable with out pt follow. Dr Weber also translated for pt was preferred by pt, will follow up with pmd and neurology as discussed.

## 2023-03-04 NOTE — ED PROVIDER NOTE - CARE PROVIDER_API CALL
Christian Osman)  Neuromuscular Medicine  [address] [place] [address], [address]  [place], [place] [id number]  Phone: [phone]  Fax: [phone]  Follow Up Time: 1-3 Days

## 2023-03-04 NOTE — ED PROVIDER NOTE - CONSIDERATION OF ADMISSION OBSERVATION
scalation to admission/observation was considered. However patient feels much better and is comfortable with discharge.  Appropriate follow-up was arranged. Consideration of Admission/Observation

## 2023-03-07 ENCOUNTER — APPOINTMENT (OUTPATIENT)
Dept: OPHTHALMOLOGY | Facility: CLINIC | Age: 38
End: 2023-03-07

## 2023-03-20 NOTE — ED PROVIDER NOTE - IV ALTEPLASE ADMIN OUTSIDE HIDDEN
show Bed in lowest position, wheels locked, appropriate side rails in place/Call bell, personal items and telephone in reach/Instruct patient to call for assistance before getting out of bed or chair/Non-slip footwear when patient is out of bed/[place] to call system/Physically safe environment - no spills, clutter or unnecessary equipment/Purposeful Proactive Rounding/Room/bathroom lighting operational, light cord in reach

## 2023-03-23 ENCOUNTER — APPOINTMENT (OUTPATIENT)
Dept: HEMATOLOGY ONCOLOGY | Facility: CLINIC | Age: 38
End: 2023-03-23
Payer: MEDICAID

## 2023-03-23 ENCOUNTER — LABORATORY RESULT (OUTPATIENT)
Age: 38
End: 2023-03-23

## 2023-03-23 ENCOUNTER — OUTPATIENT (OUTPATIENT)
Dept: OUTPATIENT SERVICES | Facility: HOSPITAL | Age: 38
LOS: 1 days | End: 2023-03-23
Payer: MEDICAID

## 2023-03-23 VITALS
HEART RATE: 68 BPM | BODY MASS INDEX: 30.99 KG/M2 | TEMPERATURE: 97.8 F | HEIGHT: 65 IN | OXYGEN SATURATION: 98 % | RESPIRATION RATE: 14 BRPM | DIASTOLIC BLOOD PRESSURE: 57 MMHG | WEIGHT: 186 LBS | SYSTOLIC BLOOD PRESSURE: 109 MMHG

## 2023-03-23 DIAGNOSIS — K86.89 OTHER SPECIFIED DISEASES OF PANCREAS: ICD-10-CM

## 2023-03-23 DIAGNOSIS — Z85.07 PERSONAL HISTORY OF MALIGNANT NEOPLASM OF PANCREAS: Chronic | ICD-10-CM

## 2023-03-23 DIAGNOSIS — D64.9 ANEMIA, UNSPECIFIED: ICD-10-CM

## 2023-03-23 PROCEDURE — 85027 COMPLETE CBC AUTOMATED: CPT

## 2023-03-23 PROCEDURE — 36415 COLL VENOUS BLD VENIPUNCTURE: CPT

## 2023-03-23 PROCEDURE — 86301 IMMUNOASSAY TUMOR CA 19-9: CPT

## 2023-03-23 PROCEDURE — 80053 COMPREHEN METABOLIC PANEL: CPT

## 2023-03-23 PROCEDURE — 99213 OFFICE O/P EST LOW 20 MIN: CPT

## 2023-03-23 PROCEDURE — 82728 ASSAY OF FERRITIN: CPT

## 2023-03-24 DIAGNOSIS — D64.9 ANEMIA, UNSPECIFIED: ICD-10-CM

## 2023-03-24 DIAGNOSIS — K86.89 OTHER SPECIFIED DISEASES OF PANCREAS: ICD-10-CM

## 2023-03-24 LAB
ALBUMIN SERPL ELPH-MCNC: 4.1 G/DL
ALP BLD-CCNC: 109 U/L
ALT SERPL-CCNC: 23 U/L
ANION GAP SERPL CALC-SCNC: 14 MMOL/L
AST SERPL-CCNC: 17 U/L
BILIRUB SERPL-MCNC: 0.3 MG/DL
BUN SERPL-MCNC: 13 MG/DL
CALCIUM SERPL-MCNC: 8.6 MG/DL
CANCER AG19-9 SERPL-ACNC: 11 U/ML
CHLORIDE SERPL-SCNC: 105 MMOL/L
CO2 SERPL-SCNC: 18 MMOL/L
CREAT SERPL-MCNC: 0.5 MG/DL
EGFR: 124 ML/MIN/1.73M2
FERRITIN SERPL-MCNC: 81 NG/ML
GLUCOSE SERPL-MCNC: 121 MG/DL
HCT VFR BLD CALC: 35.1 %
HGB BLD-MCNC: 11.8 G/DL
MCHC RBC-ENTMCNC: 29.5 PG
MCHC RBC-ENTMCNC: 33.6 G/DL
MCV RBC AUTO: 87.8 FL
PLATELET # BLD AUTO: 253 K/UL
PMV BLD: 10.5 FL
POTASSIUM SERPL-SCNC: 3.8 MMOL/L
PROT SERPL-MCNC: 6.8 G/DL
RBC # BLD: 4 M/UL
RBC # FLD: 13.2 %
SODIUM SERPL-SCNC: 137 MMOL/L
WBC # FLD AUTO: 6.73 K/UL

## 2023-03-24 NOTE — PHYSICAL EXAM
[Normal] : grossly intact [de-identified] : healed scar of surgery , no tenderness or redness. no palpable masses.  [de-identified] : mild tenderness over mid thoracic spine  [de-identified] : no focal deficits .

## 2023-03-24 NOTE — HISTORY OF PRESENT ILLNESS
[de-identified] : \par 34 yo female , Welsh speaking, born in [place] , interviewed with  # [id number]. Patient with  with no significant  significant medical history . S/p pancreaticoduodenectomy, pylorus-preserving R0 resection of  for pancreatic mass diagnosed when she presented with epigastric and periumbilical swelling for 3 months.  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  surgery complicated with a sepsis - possibly from aspiration pneumonia. \par type A pancreatic fistula and early resolution of delayed gastric emptying. She continues to have drainage 10 to 20cc daily . She denies fever, nausea, vomiting , abdominal pain or diarrhea. \par \par \par \par  [de-identified] : 06/14/2018 patient returns for follow up , she feels well , no abdominal pain or weight loss. no nausea or vomiting . Blood work was notable for mild normocytic anemia , Ferritin 38 . She denies hematochezia or heavy menses. \par \par 08/21/2018 : Patient returns for follow up , she is on iron one daily , Hb improved to 12. She complains of slight increase in epigastric discomfort , no nausea, vomiting , pruritis, change in bowel habits. no weight loss. \par \par 06/03/2019 Patient returns for history of pancreatic cancer s/p Whipple's with no evidence of disease. Iron deficiency anemia s/p venofer .She feels well and denies any GI symptoms . no weight loss, nausea vomiting . Recent blood work shows slightly elevated alkaline phosphatase .\par \par 01/27/2020 Patient returns with chief complaint of interscapular pain for 2 to 3 weeks , worse with activity and present at night . 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 She reports urinary frequency without dysuria , change in bowel habits , leg weakness or numbness. \par \par 02/10/2020\par Patient returns for history of pancreatic cancer s/p Whipple's with no evidence of disease. Ferritin of 8 noted on 01/27/2020. On venofer. Her interscapular pain is better with cyclobenzaprine. X ray did not reveal any fractures. She complains of increased urinary frequency. No fevers. \par Her menses are regular and normal. \par \par 6/11/2020: SHIELA VERMA is a 35 year old female here today for follow up visit for history of EDWIN and pancreatic cancer s/p Whipples. She feels well besdies mild fatigue. She denies any GI symptoms,  weight loss, nausea, vomiting, or hematochezia. she received Venofer 200mg IV x5 doses; last dose was Feb 20, 2020. She complains of urticaria around her areolas which started about a week ago. Last mammogram was May 2019 which showed no evidence for malignancy. CBC reviewed normocytic anemia noted. Will recheck iron studies and ferritin on 6/11/2020\par \par 9/17/2020: SHIELA VERMA is a 35 year old female here today for follow up visit for history of EDWIN and pancreatic cancer s/p Whipples. She feels well besides mild fatigue. She denies any GI symptoms,  weight loss, nausea, vomiting, or hematochezia. she received Venofer 200mg IV x5 doses; last dose was Feb 20, 2020. CT C/A/P was completed on 9/8/2020 CT A/P was negative for disease; post surgical changes from Whipple procedure. CT of chest showed : \par IMPRESSION:\par Since January 23, 2018\par Nonspecific 2 mm subpleural nodule in the right lower lobe which may represent a focus of scarring/atelectasis\par Scattered 2 mm nodules in the right upper lobe which may be postinflammatory in etiology.\par Patient denies cough at this time. She states that she does have mild SOB on exertion. \par \par 2/2/2021: Patient presents for follow up. Complains of difficulty breathing for several weeks. Upon further clarification, denies overt shortness of breath or cough. Has nasal congestion. Also complains of posterior neck and paraspinal upper back pain and sensation of warmth for about the same length of time. Denies nausea, vomiting, anorexia. She is asking to have her blood levels checked because of her history of iron deficiency (last received venofer in 2/2020). Her PMD checked labs in 12/2020, and she had hemoglobin of 12 with 16% saturation and ferritin of 25. Patient does not take oral iron.\par \par 05/04/2021: SHIELA is here for follow up visit for history of pancreatic cancer s/p Whipple procedure. In addition, she has EDWIN; not currently on iron supplement at this time. She denies weight loss, abdominal pain, increased fatigue, SOB, or hematochezia at this time. She does complain of increased nasal congestion. She was seen by ENT who started her on a course of doxycycline, steroids, and fluticasone. Posterior neck and paraspinal pain has improved. Bone scan was completed in March 2021 which was normal. \par \par 11/02/21\par Pt returns for f/u today . She reports feeling fine . Pt was admitted to hospital with headache and dizziness . She had MRI that showed : IMPRESSION:\par 1. No MRI evidence of intracranial metastatic disease. No acute infarct or intracranial hemorrhage.\par 2. Few scattered small/punctate foci of white matter signal abnormality which are nonspecific in etiology.\par Pt was seen by neurology and was given sumtriptan for migraines . Pt reports that she takes it on and off and her headaches have resolved . She denies any fever , chills, weight loss , loss of appetite and abdominal pain . Her sister was recently diagnosed with uterine mass ? and is being worked up by Gyn . Pt also reports that her mother had uterine cancer 20 years ago , and recently diagnosed with bladder cancer and had surgery done . Pt reports that she was seen by Gyn last year and everything was fine . \par \par 4/26/22- Patient is here for follow up visit. She is doing well with no new c/o abdominal pain, weight loss, OLESYA. Her migraines are better as well. No other new issues \par \par 11/8/2022 Patient returns for follow up , she had negative biopsy of mammographically detected left breast lesion . She feels well and denies any abdominal pain , increased in girth , change in bowel habits. 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 Blood work was normal including Hgb . she is scheduled to see neurology . she had similar episode in 2021.

## 2023-03-24 NOTE — ASSESSMENT
[FreeTextEntry1] : 37 yof with solid pseudo-papillary neoplasm of head of pancreas pT2N0, s/p R0 resection in 2018\par History of iron deficiency anemia, s/p venofer, in 2/2020.\par \par - TARYN; continue on observation \par -- Last CT A/P in Nov 2021 was normal. \par -Negative genetic testing . \par \par - episode of blurred vision , ( migraine ? ) \par \par Plan : repeat ferritin , CA19.9 Ct scan \par          follow up with neurology . \par          follow up in 6 months .

## 2023-03-30 ENCOUNTER — APPOINTMENT (OUTPATIENT)
Dept: OPHTHALMOLOGY | Facility: CLINIC | Age: 38
End: 2023-03-30

## 2023-04-28 ENCOUNTER — RESULT REVIEW (OUTPATIENT)
Age: 38
End: 2023-04-28

## 2023-04-28 ENCOUNTER — OUTPATIENT (OUTPATIENT)
Dept: OUTPATIENT SERVICES | Facility: HOSPITAL | Age: 38
LOS: 1 days | End: 2023-04-28
Payer: MEDICAID

## 2023-04-28 DIAGNOSIS — Z85.07 PERSONAL HISTORY OF MALIGNANT NEOPLASM OF PANCREAS: Chronic | ICD-10-CM

## 2023-04-28 DIAGNOSIS — R06.2 WHEEZING: ICD-10-CM

## 2023-04-28 DIAGNOSIS — K86.89 OTHER SPECIFIED DISEASES OF PANCREAS: ICD-10-CM

## 2023-04-28 PROCEDURE — 74177 CT ABD & PELVIS W/CONTRAST: CPT

## 2023-04-28 PROCEDURE — 74177 CT ABD & PELVIS W/CONTRAST: CPT | Mod: 26

## 2023-04-28 PROCEDURE — 71260 CT THORAX DX C+: CPT

## 2023-04-28 PROCEDURE — 71260 CT THORAX DX C+: CPT | Mod: 26

## 2023-04-29 DIAGNOSIS — R06.2 WHEEZING: ICD-10-CM

## 2023-04-30 ENCOUNTER — NON-APPOINTMENT (OUTPATIENT)
Age: 38
End: 2023-04-30

## 2023-05-02 ENCOUNTER — APPOINTMENT (OUTPATIENT)
Dept: OPHTHALMOLOGY | Facility: CLINIC | Age: 38
End: 2023-05-02

## 2023-05-08 ENCOUNTER — OUTPATIENT (OUTPATIENT)
Dept: OUTPATIENT SERVICES | Facility: HOSPITAL | Age: 38
LOS: 1 days | End: 2023-05-08
Payer: MEDICAID

## 2023-05-08 ENCOUNTER — LABORATORY RESULT (OUTPATIENT)
Age: 38
End: 2023-05-08

## 2023-05-08 ENCOUNTER — APPOINTMENT (OUTPATIENT)
Dept: OBGYN | Facility: CLINIC | Age: 38
End: 2023-05-08
Payer: MEDICAID

## 2023-05-08 VITALS
DIASTOLIC BLOOD PRESSURE: 80 MMHG | WEIGHT: 176 LBS | BODY MASS INDEX: 29.32 KG/M2 | SYSTOLIC BLOOD PRESSURE: 122 MMHG | HEIGHT: 65 IN

## 2023-05-08 DIAGNOSIS — Z00.00 ENCOUNTER FOR GENERAL ADULT MEDICAL EXAMINATION WITHOUT ABNORMAL FINDINGS: ICD-10-CM

## 2023-05-08 DIAGNOSIS — Z85.07 PERSONAL HISTORY OF MALIGNANT NEOPLASM OF PANCREAS: Chronic | ICD-10-CM

## 2023-05-08 DIAGNOSIS — Z01.419 ENCOUNTER FOR GYNECOLOGICAL EXAMINATION (GENERAL) (ROUTINE) W/OUT ABNORMAL FINDINGS: ICD-10-CM

## 2023-05-08 PROCEDURE — 99395 PREV VISIT EST AGE 18-39: CPT

## 2023-05-08 PROCEDURE — 81001 URINALYSIS AUTO W/SCOPE: CPT

## 2023-05-08 PROCEDURE — 87086 URINE CULTURE/COLONY COUNT: CPT

## 2023-05-08 RX ORDER — CIPROFLOXACIN HYDROCHLORIDE 500 MG/1
500 TABLET, FILM COATED ORAL
Qty: 14 | Refills: 0 | Status: ACTIVE | COMMUNITY
Start: 2023-05-08 | End: 1900-01-01

## 2023-05-08 NOTE — PHYSICAL EXAM
[Chaperone Present] : A chaperone was present in the examining room during all aspects of the physical examination [Appropriately responsive] : appropriately responsive [Alert] : alert [No Acute Distress] : no acute distress [Soft] : soft [Non-tender] : non-tender [Non-distended] : non-distended [No HSM] : No HSM [No Lesions] : no lesions [No Mass] : no mass [Oriented x3] : oriented x3 [Examination Of The Breasts] : a normal appearance [No Masses] : no breast masses were palpable [Labia Majora] : normal [Labia Minora] : normal [Normal] : normal [Uterine Adnexae] : normal [FreeTextEntry7] : Abdominal scar noted.

## 2023-05-08 NOTE — COUNSELING
[Nutrition/ Exercise/ Weight Management] : nutrition, exercise, weight management [Breast Self Exam] : breast self exam [Preconception Care/ Fertility options] : preconception care, fertility options [Pregnancy Options] : pregnancy options

## 2023-05-08 NOTE — HISTORY OF PRESENT ILLNESS
[Patient reported PAP Smear was normal] : Patient reported PAP Smear was normal [FreeTextEntry1] : Here for annual\par No complaints.\par Would like to get pregnant but hasn't tried to conceive yet.\par  [PapSmeardate] : 2022

## 2023-05-10 DIAGNOSIS — Z01.419 ENCOUNTER FOR GYNECOLOGICAL EXAMINATION (GENERAL) (ROUTINE) WITHOUT ABNORMAL FINDINGS: ICD-10-CM

## 2023-05-11 ENCOUNTER — APPOINTMENT (OUTPATIENT)
Dept: NEUROLOGY | Facility: CLINIC | Age: 38
End: 2023-05-11
Payer: MEDICAID

## 2023-05-11 VITALS
DIASTOLIC BLOOD PRESSURE: 84 MMHG | HEIGHT: 66 IN | HEART RATE: 72 BPM | SYSTOLIC BLOOD PRESSURE: 121 MMHG | BODY MASS INDEX: 28.28 KG/M2 | WEIGHT: 176 LBS

## 2023-05-11 LAB — BACTERIA UR CULT: NORMAL

## 2023-05-11 PROCEDURE — 99204 OFFICE O/P NEW MOD 45 MIN: CPT

## 2023-05-11 RX ORDER — SUMATRIPTAN 100 MG/1
100 TABLET, FILM COATED ORAL
Qty: 9 | Refills: 5 | Status: ACTIVE | COMMUNITY
Start: 2023-05-11 | End: 1900-01-01

## 2023-05-11 NOTE — HISTORY OF PRESENT ILLNESS
[FreeTextEntry1] : It's a pleasure to see Ms. SHIELA VERMA In the office today. She is a 38 year -  old woman  who presents to the office today for the evaluation of An acute episode of transient  right eye blurriness, associated with mild headache and involuntary head movement.  She reports that this happened while she was driving 2 months ago, and it only lasted for few seconds, but she did have another episode recently while she was talking to her .   she denies double vision.   she did go to the hospital and supposedly had a CT scan of the head which was  normal.  She had a similar episode in 2021  although she did not have the involuntary head movement, and was seen by a neurologist at [facility].   workup done at that time was said to be normal and she was given the diagnosis of migraine syndrome.  She was prescribed sumatriptan which she never got according to her.\par \par she has a history of pancreatic cancer in 2018,  status post Whipple's procedure\par

## 2023-05-11 NOTE — ASSESSMENT
[FreeTextEntry1] :  visual disturbance/ involuntary head movement \par -etiology unclear but most likely migrainous phenomenon \par -because of her history of pancreatic cancer, I would like to send her for MRI of the brain, EEG for further evaluation\par -a trial of sumatriptan

## 2023-05-26 ENCOUNTER — APPOINTMENT (OUTPATIENT)
Dept: SURGERY | Facility: CLINIC | Age: 38
End: 2023-05-26
Payer: MEDICAID

## 2023-05-26 VITALS
TEMPERATURE: 97.8 F | HEART RATE: 98 BPM | DIASTOLIC BLOOD PRESSURE: 70 MMHG | SYSTOLIC BLOOD PRESSURE: 100 MMHG | OXYGEN SATURATION: 97 % | WEIGHT: 181 LBS | HEIGHT: 66 IN | BODY MASS INDEX: 29.09 KG/M2

## 2023-05-26 PROCEDURE — 99212 OFFICE O/P EST SF 10 MIN: CPT

## 2023-05-26 NOTE — PHYSICAL EXAM
[de-identified] : Her breasts are symmetrical.  She has no cervical supraclavicular axillary adenopathy.  Her left breast has scattered nodules but no dominant masses, skin changes or nipple inversion.  The biopsy site is well-healed.  The right breast also shows some scattered nodules which are nontender.  No dominant masses.

## 2023-05-26 NOTE — HISTORY OF PRESENT ILLNESS
[de-identified] : This patient was initially seen about 6 months ago for a retroareolar breast mass in the left breast that was symptomatic.  Subsequently had ultrasound-guided biopsy that showed an apical and cyst.  At this point she reports no symptoms.  She initially had a bit of a lump there after the biopsy which was likely hematoma that has resolved.  She denies any skin changes or nipple discharge.

## 2023-06-22 ENCOUNTER — APPOINTMENT (OUTPATIENT)
Dept: MRI IMAGING | Facility: CLINIC | Age: 38
End: 2023-06-22
Payer: MEDICAID

## 2023-06-22 ENCOUNTER — APPOINTMENT (OUTPATIENT)
Dept: NEUROLOGY | Facility: CLINIC | Age: 38
End: 2023-06-22
Payer: MEDICAID

## 2023-06-22 PROCEDURE — 70551 MRI BRAIN STEM W/O DYE: CPT

## 2023-06-22 PROCEDURE — 95816 EEG AWAKE AND DROWSY: CPT

## 2023-06-30 ENCOUNTER — APPOINTMENT (OUTPATIENT)
Dept: NEUROLOGY | Facility: CLINIC | Age: 38
End: 2023-06-30
Payer: MEDICAID

## 2023-06-30 VITALS
BODY MASS INDEX: 29.09 KG/M2 | WEIGHT: 181 LBS | HEART RATE: 79 BPM | DIASTOLIC BLOOD PRESSURE: 89 MMHG | HEIGHT: 66 IN | SYSTOLIC BLOOD PRESSURE: 135 MMHG

## 2023-06-30 PROCEDURE — 99214 OFFICE O/P EST MOD 30 MIN: CPT

## 2023-06-30 NOTE — PHYSICAL EXAM
[Person] : oriented to person [Place] : oriented to place [Time] : oriented to time [Concentration Intact] : normal concentrating ability [Visual Intact] : visual attention was ~T not ~L decreased [Naming Objects] : no difficulty naming common objects [Repeating Phrases] : no difficulty repeating a phrase [Writing A Sentence] : no difficulty writing a sentence [Fluency] : fluency intact [Comprehension] : comprehension intact [Reading] : reading intact [Past History] : adequate knowledge of personal past history [Cranial Nerves Optic (II)] : visual acuity intact bilaterally,  visual fields full to confrontation, pupils equal round and reactive to light [Cranial Nerves Oculomotor (III)] : extraocular motion intact [Cranial Nerves Trigeminal (V)] : facial sensation intact symmetrically [Cranial Nerves Vestibulocochlear (VIII)] : hearing was intact bilaterally [Cranial Nerves Facial (VII)] : face symmetrical [Cranial Nerves Glossopharyngeal (IX)] : tongue and palate midline [Cranial Nerves Accessory (XI - Cranial And Spinal)] : head turning and shoulder shrug symmetric [Cranial Nerves Hypoglossal (XII)] : there was no tongue deviation with protrusion [Motor Strength] : muscle strength was normal in all four extremities [No Muscle Atrophy] : normal bulk in all four extremities [Sensation Tactile Decrease] : light touch was intact [Balance] : balance was intact [2+] : Ankle jerk left 2+ [General Appearance - Alert] : alert [General Appearance - In No Acute Distress] : in no acute distress [Oriented To Time, Place, And Person] : oriented to person, place, and time [Impaired Insight] : insight and judgment were intact [Affect] : the affect was normal [Motor Strength Upper Extremities Bilaterally] : strength was normal in both upper extremities [Motor Strength Lower Extremities Bilaterally] : strength was normal in both lower extremities [Past-pointing] : there was no past-pointing [Tremor] : no tremor present [Plantar Reflex Right Only] : normal on the right [Plantar Reflex Left Only] : normal on the left [PERRL With Normal Accommodation] : pupils were equal in size, round, reactive to light, with normal accommodation [Extraocular Movements] : extraocular movements were intact [Neck Appearance] : the appearance of the neck was normal [No Spinal Tenderness] : no spinal tenderness [Abnormal Walk] : normal gait [Involuntary Movements] : no involuntary movements were seen [Motor Tone] : muscle strength and tone were normal

## 2023-06-30 NOTE — HISTORY OF PRESENT ILLNESS
[FreeTextEntry1] : Original Presentation : It's a pleasure to see Ms. SHIELA ALVARENGA In the office today. She is a 38 year old woman  who presents to the office today for the evaluation of An acute episode of transient  right eye blurriness, associated with mild headache and involuntary head movement.  She reports that this happened while she was driving 2 months ago, and it only lasted for few seconds, but she did have another episode recently while she was talking to her . She did go to the hospital and supposedly had a CT scan of the head which was  normal.  She had a similar episode in 2021  although she did not have the involuntary head movement, and was seen by a neurologist at [facility].  Workup done at that time was said to be normal and she was given the diagnosis of migraine syndrome.  She was prescribed sumatriptan which she never got according to her. She has a history of pancreatic cancer in 2018,  status post Whipple's procedure\par \par MRI Brain 6.23.23 :  Small number subcortical white matter FLAIR hyperintensities of undetermined etiology. \par Differential considerations include sequela of migraine headaches,  microvascular inflammatory or infectious causes. In view of the history of neoplasia, a contrast scan may be considered clinically. Otherwise, normal brain study. \par EEG 6.22.23 : normal \par \par Today : Today I had the pleasure of seeing Ms. Alvarenga in our office for follow up.  The patients previous history and physical findings have been reviewed. \par  \par Ms Alvarenga remains under our care for transient vision changes, involuntary head movements and headaches. Today we reviewed her MRI of the brain and EEG, results noted above. At her previous visit she was prescribed Sumatriptan 100mg PRN for migraines. Since her previous visit on 5.11.23 she denies new episodes of headache, vision changes or head movements and has not had to take the Sumatriptan. We discussed seeing her eyes doctor for a routine evaluation and she states she will make an appointment as she has not had her eyes checked in over a year.

## 2023-06-30 NOTE — ASSESSMENT
[FreeTextEntry1] : 38 year old female with hx of pancreatic CA under our care for headaches,  visual disturbance/ involuntary head movement. She has not had any additional episodes of these symptoms since her initial visit. We reviewed her MRI of the brain and EEg and discussed seeing her eye doctor for evaluation. She will follow up in 2 months and if she has any recurrent episodes we will obtain an MRI of the brain with contrast. \par \par I have personally reviewed with the PA, this patient’s history and physical exam findings, as documented above. I have discussed the relevant areas of concern, having direct implications to the presenting problems and illnesses, and have personally examined all pertinent findings which impact on the prior neurological treatment. \par \par Tati Gruber MS, PA-C\par Nael Hodges DO\par  \par

## 2023-07-29 NOTE — PATIENT PROFILE ADULT. - PRO ANTICIPATED DISCH DISP
[facility]                                              CARDIOLOGY AT [place] [address]                                             Telephone: [phone]. Fax:[phone]                                                       CARDIOLOGY CONSULTATION NOTE                                                                                             History obtained by: Patient and medical record  Community Cardiologist: none   obtained: Yes [ x ] No [  ][id number]  Reason for Consultation: syncope / SVT  Available out pt records reviewed: Yes [  ] No [  ]    Chief complaint:    Patient is a 75y old  Female PMH hypothyroid. Presented with syncope after eating rice in a rush to go to work in the morning. Was found passed out on ground by her niece. According to family collateral this has never happened before. She is known to chock on food. Patient endorses she felt like her blood pressure was low and then she passed out and woke up to her niece.  She doesn't recollect much else from the incident. Niece denies she was SOB or having anginal symptoms when she found her. Found to have SAH/SDH. Denies Cp, palpitations, weight loss, fevers          CARDIAC TESTING   ECHO:  < from: [facility] or F/U (07.28.23 @ 20:23) >  PHYSICIAN INTERPRETATION:  Left Ventricle: The left ventricular internal cavity size is normal. Left   ventricular wall thickness is normal.  Global LV systolic function was hyperdynamic. Left ventricular ejection   fraction, by visual estimation, is >75%. Spectral Doppler shows normal   pattern of LV diastolic filling.  Right Ventricle: Normal right ventricular size and function.  Left Atrium: Normal left atrial size.  Right Atrium: Normal right atrial size.  Pericardium: There is no evidence of pericardial effusion.  Mitral Valve: Structurally normal mitral valve, with normal leaflet   excursion. The mitral valve is normal in structure. Trace mitral valve   regurgitation is seen.  Tricuspid Valve: The tricuspid valve is normal in structure. Mild   tricuspid regurgitation is visualized. Estimated pulmonary artery   systolic pressure is 36.6 mmHg assuming a right atrial pressure of 3   mmHg, which is consistent with borderline pulmonary hypertension.  Aortic Valve: Normal trileaflet aortic valve with normal opening. Mild to   moderate aortic valve regurgitation is seen.  Pulmonic Valve: Mild pulmonic valve regurgitation.  Aorta: The aortic root is normal in size and structure.  Venous: The inferior vena cava was normal sized, with respiratory size   variation greater than 50%.    < end of copied text >    STRESS:    CATH:     ELECTROPHYSIOLOGY:     PAST MEDICAL HISTORY  Hypothyroidism        PAST SURGICAL HISTORY  No significant past surgical history        SOCIAL HISTORY:  Denies smoking/alcohol/drugs  CIGARETTES:     ALCOHOL:  DRUGS:    FAMILY HISTORY:  No pertinent family history in first degree relatives      Family History of Cardiovascular Disease:  Yes [  ] No [x  ]  Coronary Artery Disease in first degree relative: Yes [  ] No [x  ]  Sudden Cardiac Death in First degree relative: Yes [  ] No [ x ]    HOME MEDICATIONS:  levothyroxine 25 mcg (0.025 mg) oral tablet: 1 tab(s) orally once a day (28 Jul 2023 10:02)      CURRENT CARDIAC MEDICATIONS:      CURRENT OTHER MEDICATIONS:  acetaminophen   IVPB .. 1000 milliGRAM(s) IV Intermittent every 6 hours, Stop order after: 4 Doses PRN Mild Pain (1 - 3), Moderate Pain (4 - 6), Severe Pain (7 - 10)  enoxaparin Injectable 40 milliGRAM(s) SubCutaneous every 24 hours  lactated ringers Bolus 500 milliLiter(s) IV Bolus once, Stop order after: 1 Doses  levothyroxine 25 MICROGram(s) Oral daily      ALLERGIES:   No Known Allergies      REVIEW OF SYMPTOMS:   CONSTITUTIONAL: No fever, no chills, no weight loss, no weight gain, no fatigue   ENMT:  No vertigo; No sinus or throat pain  NECK: No pain or stiffness  CARDIOVASCULAR: No chest pain, no dyspnea, no syncope/presyncope, no palpitations, no dizziness, no Orthopnea, no Paroxsymal nocturnal dyspnea  RESPIRATORY: no Shortness of breath, no cough, no wheezing  : No dysuria, no hematuria   GI: No dark color stool, no nausea, no diarrhea, no constipation, no abdominal pain   NEURO: No headache, no slurred speech   MUSCULOSKELETAL: No joint pain or swelling; No muscle, back, or extremity pain  PSYCH: No agitation, no anxiety.    ALL OTHER REVIEW OF SYSTEMS ARE NEGATIVE.    VITAL SIGNS:  T(C): 37.3 (07-29-23 @ 15:39), Max: 37.3 (07-29-23 @ 15:39)  T(F): 99.1 (07-29-23 @ 15:39), Max: 99.1 (07-29-23 @ 15:39)  HR: 66 (07-29-23 @ 15:39) (60 - 75)  BP: 150/71 (07-29-23 @ 15:39) (91/56 - 150/71)  RR: 18 (07-29-23 @ 15:39) (13 - 24)  SpO2: 94% (07-29-23 @ 15:39) (94% - 100%)    INTAKE AND OUTPUT:     07-28 @ 07:01  -  07-29 @ 07:00  --------------------------------------------------------  IN: 1800 mL / OUT: 1650 mL / NET: 150 mL    07-29 @ 07:01  -  07-29 @ 16:33  --------------------------------------------------------  IN: 840 mL / OUT: 800 mL / NET: 40 mL        PHYSICAL EXAM:  Constitutional: Comfortable . No acute distress.   CNS: A&Ox3. No focal deficits.   Respiratory: CTAB, unlabored   Cardiovascular: RRR normal s1 s2. No murmur. No rubs or gallop.  Gastrointestinal: Soft, non-tender. +Bowel sounds.   Extremities: 2+ Peripheral Pulses, No clubbing, cyanosis, or edema  Psychiatric: Calm . no agitation.   Skin: Warm and dry, no ulcers on extremities     LABS:  ( 28 Jul 2023 09:01 )  Troponin T  <0.01,  CPK  X    , CKMB  X    , BNP X                                  10.8   7.54  )-----------( 197      ( 29 Jul 2023 03:15 )             32.7     07-29    143  |  110<H>  |  8.2  ----------------------------<  101<H>  3.9   |  22.0  |  0.39<L>    Ca    8.1<L>      29 Jul 2023 03:15  Phos  2.6     07-29  Mg     2.5     07-29    TPro  8.1  /  Alb  3.9  /  TBili  0.6  /  DBili  x   /  AST  49<H>  /  ALT  20  /  AlkPhos  90  07-28    PT/INR - ( 28 Jul 2023 09:44 )   PT: 11.7 sec;   INR: 1.06 ratio         PTT - ( 28 Jul 2023 09:44 )  PTT:28.6 sec  Urinalysis Basic - ( 29 Jul 2023 03:15 )    Color: x / Appearance: x / SG: x / pH: x  Gluc: 101 mg/dL / Ketone: x  / Bili: x / Urobili: x   Blood: x / Protein: x / Nitrite: x   Leuk Esterase: x / RBC: x / WBC x   Sq Epi: x / Non Sq Epi: x / Bacteria: x              INTERPRETATION OF TELEMETRY: SR    ECG: SR TWI AVR / V1  Prior ECG: Yes [ x ] No [  ]    RADIOLOGY & ADDITIONAL STUDIES:     CT scan:    < from: CT Head No Cont (07.29.23 @ 11:37) >  Impression: Decreasing left frontal subarachnoid hemorrhage compared with   7/28/2023 with residual left temporal parietal subarachnoid hemorrhage no   new hemorrhage.    < end of copied text >     home w/ home health

## 2023-08-22 NOTE — H&P PST ADULT - WEIGHT IN LBS
165.3 Azathioprine Pregnancy And Lactation Text: This medication is Pregnancy Category D and isn't considered safe during pregnancy. It is unknown if this medication is excreted in breast milk.

## 2023-08-23 ENCOUNTER — APPOINTMENT (OUTPATIENT)
Dept: OPHTHALMOLOGY | Facility: CLINIC | Age: 38
End: 2023-08-23
Payer: MEDICAID

## 2023-08-23 ENCOUNTER — OUTPATIENT (OUTPATIENT)
Dept: OUTPATIENT SERVICES | Facility: HOSPITAL | Age: 38
LOS: 1 days | End: 2023-08-23
Payer: MEDICAID

## 2023-08-23 DIAGNOSIS — H53.8 OTHER VISUAL DISTURBANCES: ICD-10-CM

## 2023-08-23 DIAGNOSIS — Z85.07 PERSONAL HISTORY OF MALIGNANT NEOPLASM OF PANCREAS: Chronic | ICD-10-CM

## 2023-08-23 PROCEDURE — 92133 CPTRZD OPH DX IMG PST SGM ON: CPT

## 2023-08-23 PROCEDURE — 92014 COMPRE OPH EXAM EST PT 1/>: CPT

## 2023-09-01 ENCOUNTER — APPOINTMENT (OUTPATIENT)
Dept: NEUROLOGY | Facility: CLINIC | Age: 38
End: 2023-09-01

## 2023-09-05 DIAGNOSIS — H53.19 OTHER SUBJECTIVE VISUAL DISTURBANCES: ICD-10-CM

## 2023-09-05 DIAGNOSIS — D31.01 BENIGN NEOPLASM OF RIGHT CONJUNCTIVA: ICD-10-CM

## 2023-09-05 DIAGNOSIS — H00.12 CHALAZION RIGHT LOWER EYELID: ICD-10-CM

## 2023-10-01 PROBLEM — K86.89 MASS OF PANCREAS: Status: ACTIVE | Noted: 2018-01-12

## 2023-10-06 ENCOUNTER — APPOINTMENT (OUTPATIENT)
Dept: NEUROLOGY | Facility: CLINIC | Age: 38
End: 2023-10-06
Payer: MEDICAID

## 2023-10-06 VITALS
HEART RATE: 54 BPM | WEIGHT: 181 LBS | BODY MASS INDEX: 29.09 KG/M2 | DIASTOLIC BLOOD PRESSURE: 74 MMHG | SYSTOLIC BLOOD PRESSURE: 110 MMHG | HEIGHT: 66 IN

## 2023-10-06 DIAGNOSIS — R42 DIZZINESS AND GIDDINESS: ICD-10-CM

## 2023-10-06 PROCEDURE — 99215 OFFICE O/P EST HI 40 MIN: CPT

## 2023-10-10 ENCOUNTER — LABORATORY RESULT (OUTPATIENT)
Age: 38
End: 2023-10-10

## 2023-10-10 ENCOUNTER — APPOINTMENT (OUTPATIENT)
Dept: HEMATOLOGY ONCOLOGY | Facility: CLINIC | Age: 38
End: 2023-10-10
Payer: MEDICAID

## 2023-10-10 ENCOUNTER — OUTPATIENT (OUTPATIENT)
Dept: OUTPATIENT SERVICES | Facility: HOSPITAL | Age: 38
LOS: 1 days | End: 2023-10-10
Payer: MEDICAID

## 2023-10-10 VITALS
OXYGEN SATURATION: 98 % | TEMPERATURE: 98.6 F | BODY MASS INDEX: 28.12 KG/M2 | RESPIRATION RATE: 14 BRPM | HEART RATE: 55 BPM | WEIGHT: 175 LBS | SYSTOLIC BLOOD PRESSURE: 111 MMHG | HEIGHT: 66 IN | DIASTOLIC BLOOD PRESSURE: 62 MMHG

## 2023-10-10 DIAGNOSIS — K86.89 OTHER SPECIFIED DISEASES OF PANCREAS: ICD-10-CM

## 2023-10-10 DIAGNOSIS — D64.9 ANEMIA, UNSPECIFIED: ICD-10-CM

## 2023-10-10 DIAGNOSIS — Z85.07 PERSONAL HISTORY OF MALIGNANT NEOPLASM OF PANCREAS: Chronic | ICD-10-CM

## 2023-10-10 LAB
HCT VFR BLD CALC: 35.6 %
HGB BLD-MCNC: 11.9 G/DL
MCHC RBC-ENTMCNC: 29.1 PG
MCHC RBC-ENTMCNC: 33.4 G/DL
MCV RBC AUTO: 87 FL
PLATELET # BLD AUTO: 235 K/UL
PMV BLD: 11.1 FL
RBC # BLD: 4.09 M/UL
RBC # FLD: 13.3 %
WBC # FLD AUTO: 6.51 K/UL

## 2023-10-10 PROCEDURE — 85027 COMPLETE CBC AUTOMATED: CPT

## 2023-10-10 PROCEDURE — 36415 COLL VENOUS BLD VENIPUNCTURE: CPT

## 2023-10-10 PROCEDURE — 82728 ASSAY OF FERRITIN: CPT

## 2023-10-10 PROCEDURE — 80053 COMPREHEN METABOLIC PANEL: CPT

## 2023-10-10 PROCEDURE — 99213 OFFICE O/P EST LOW 20 MIN: CPT

## 2023-10-10 PROCEDURE — 86301 IMMUNOASSAY TUMOR CA 19-9: CPT

## 2023-10-11 LAB
ALBUMIN SERPL ELPH-MCNC: 4.2 G/DL
ALP BLD-CCNC: 95 U/L
ALT SERPL-CCNC: 25 U/L
ANION GAP SERPL CALC-SCNC: 10 MMOL/L
AST SERPL-CCNC: 21 U/L
BILIRUB SERPL-MCNC: 0.6 MG/DL
BUN SERPL-MCNC: 12 MG/DL
CALCIUM SERPL-MCNC: 8.9 MG/DL
CANCER AG19-9 SERPL-ACNC: 15 U/ML
CHLORIDE SERPL-SCNC: 106 MMOL/L
CO2 SERPL-SCNC: 23 MMOL/L
CREAT SERPL-MCNC: 0.9 MG/DL
EGFR: 84 ML/MIN/1.73M2
FERRITIN SERPL-MCNC: 22 NG/ML
GLUCOSE SERPL-MCNC: 82 MG/DL
POTASSIUM SERPL-SCNC: 4.3 MMOL/L
PROT SERPL-MCNC: 7.2 G/DL
SODIUM SERPL-SCNC: 139 MMOL/L

## 2023-10-13 ENCOUNTER — OUTPATIENT (OUTPATIENT)
Dept: OUTPATIENT SERVICES | Facility: HOSPITAL | Age: 38
LOS: 1 days | End: 2023-10-13
Payer: MEDICAID

## 2023-10-13 ENCOUNTER — APPOINTMENT (OUTPATIENT)
Dept: OPHTHALMOLOGY | Facility: CLINIC | Age: 38
End: 2023-10-13
Payer: MEDICAID

## 2023-10-13 DIAGNOSIS — Z85.07 PERSONAL HISTORY OF MALIGNANT NEOPLASM OF PANCREAS: Chronic | ICD-10-CM

## 2023-10-13 DIAGNOSIS — H53.8 OTHER VISUAL DISTURBANCES: ICD-10-CM

## 2023-10-13 PROCEDURE — 92012 INTRM OPH EXAM EST PATIENT: CPT

## 2023-10-13 PROCEDURE — 92134 CPTRZ OPH DX IMG PST SGM RTA: CPT

## 2023-10-19 ENCOUNTER — APPOINTMENT (OUTPATIENT)
Dept: INFUSION THERAPY | Facility: CLINIC | Age: 38
End: 2023-10-19

## 2023-10-23 DIAGNOSIS — H04.129 DRY EYE SYNDROME OF UNSPECIFIED LACRIMAL GLAND: ICD-10-CM

## 2023-10-23 DIAGNOSIS — H53.19 OTHER SUBJECTIVE VISUAL DISTURBANCES: ICD-10-CM

## 2023-10-26 ENCOUNTER — APPOINTMENT (OUTPATIENT)
Dept: INFUSION THERAPY | Facility: CLINIC | Age: 38
End: 2023-10-26

## 2023-11-02 ENCOUNTER — APPOINTMENT (OUTPATIENT)
Dept: INFUSION THERAPY | Facility: CLINIC | Age: 38
End: 2023-11-02

## 2023-11-05 NOTE — ASU PATIENT PROFILE, ADULT - FALLEN IN THE PAST
Patient : Bernadette Mon Age: 67 year old Sex: female   MRN: [id number] Encounter Date: 11/5/2023    History     Chief Complaint   Patient presents with   • Shortness of Breath       HPI    Bernadette Mon is a 67 year old presenting to the emergency department from nursing home via EMS for evaluation of altered mental status.  Upon further history taking after calling nursing home, nursing home nurse states that the patient normally opens her eyes without difficulty but noticed that today she was opening her eyes less and appeared to have cold sweats and chills.  There were no change in her vital signs or her vent settings.  However given the patient's decrease in eye opening and cold sweats EMS was called.  Patient was afebrile.  She is trach to vent from a nontraumatic intracranial hemorrhage.      Allergies   Allergen Reactions   • Penicillins Other (See Comments)       Current Facility-Administered Medications   Medication   • lidocaine 2% urethral (UROJET) 2 % jelly 10 mL   • VANCOMYCIN - PHARMACIST MONITORED Misc       Past Medical History:   Diagnosis Date   • Tracheostomy in place (CMD)     Initial tracheostomy tube placed Unknown       History reviewed. No pertinent surgical history.    No family history on file.    Social History     Tobacco Use   • Smoking status: Unknown     Passive exposure: Never   Vaping Use   • Vaping Use: Unknown   Substance Use Topics   • Alcohol use: Not Currently   • Drug use: Not Currently       Review of Systems     Review of Systems   Unable to perform ROS: Patient nonverbal       Physical Exam     ED Triage Vitals [11/05/23 1020]   ED Triage Vitals Group      Temp 96.9 °F (36.1 °C)      Heart Rate (!) 103      Resp (!) 23      BP (!) 138/105      SpO2 98 %      EtCO2 mmHg       Height 5' 6\" (1.676 m)      Weight 198 lb 6.6 oz (90 kg)      Weight Scale Used Scale in bed      BMI (Calculated) 32.02      IBW/kg (Calculated) 59.3       Physical Exam  Constitutional:        Appearance: She is not ill-appearing or diaphoretic.   Eyes:      General: No scleral icterus.     Conjunctiva/sclera: Conjunctivae normal.   Neck:      Comments: Trach in place with no obvious bleeding or discharge.  Cardiovascular:      Rate and Rhythm: Regular rhythm. Tachycardia present.      Pulses: Normal pulses.      Heart sounds: Normal heart sounds.   Pulmonary:      Comments: Patient trach to vent with mechanical breath sounds present.  Abdominal:      General: There is no distension.      Palpations: Abdomen is soft.   Skin:     General: Skin is warm and dry.   Neurological:      Comments: Patient nonverbal and without movement of any extremities which is her baseline.           Procedures     Procedures    Lab Results     Results for orders placed or performed during the hospital encounter of 11/05/23   Comprehensive Metabolic Panel   Result Value Ref Range    Fasting Status      Sodium 137 135 - 145 mmol/L    Potassium 5.3 (H) 3.4 - 5.1 mmol/L    Chloride 103 97 - 110 mmol/L    Carbon Dioxide 25 21 - 32 mmol/L    Anion Gap 14 7 - 19 mmol/L    Glucose 240 (H) 70 - 99 mg/dL    BUN 35 (H) 6 - 20 mg/dL    Creatinine 1.96 (H) 0.51 - 0.95 mg/dL    Glomerular Filtration Rate 28 (L) >=60    BUN/Cr 18 7 - 25    Calcium 10.3 (H) 8.4 - 10.2 mg/dL    Bilirubin, Total 0.4 0.2 - 1.0 mg/dL    GOT/AST 16 <=37 Units/L    GPT/ALT 20 <64 Units/L    Alkaline Phosphatase 158 (H) 45 - 117 Units/L    Albumin 2.5 (L) 3.6 - 5.1 g/dL    Protein, Total 10.0 (H) 6.4 - 8.2 g/dL    Globulin 7.5 (H) 2.0 - 4.0 g/dL    A/G Ratio 0.3 (L) 1.0 - 2.4   CBC with Automated Differential (performable only)   Result Value Ref Range    WBC 28.5 (H) 4.2 - 11.0 K/mcL    RBC 4.47 4.00 - 5.20 mil/mcL    HGB 12.5 12.0 - 15.5 g/dL    HCT 40.3 36.0 - 46.5 %    MCV 90.2 78.0 - 100.0 fl    MCH 28.0 26.0 - 34.0 pg    MCHC 31.0 (L) 32.0 - 36.5 g/dL    RDW-CV 15.4 (H) 11.0 - 15.0 %    RDW-SD 51.1 (H) 39.0 - 50.0 fL     (H) 140 - 450 K/mcL    NRBC 0  <=0 /100 WBC    Neutrophil, Percent 89 %    Lymphocytes, Percent 4 %    Mono, Percent 5 %    Eosinophils, Percent 0 %    Basophils, Percent 1 %    Immature Granulocytes 1 %    Absolute Neutrophils 25.6 (H) 1.8 - 7.7 K/mcL    Absolute Lymphocytes 1.0 1.0 - 4.0 K/mcL    Absolute Monocytes 1.5 (H) 0.3 - 0.9 K/mcL    Absolute Eosinophils  0.0 0.0 - 0.5 K/mcL    Absolute Basophils 0.1 0.0 - 0.3 K/mcL    Absolute Immature Granulocytes 0.2 0.0 - 0.2 K/mcL   Blood Gas, Venous   Result Value Ref Range    pH, Venous 7.35 7.35 - 7.45 Units    pCO2, Venous 45 38 - 51 mm Hg    pO2, Venous 37 35 - 42 mm Hg    HCO3, Venous 24 22 - 28 mmol/L    Base Excess/ Deficit, Venous -1 -2 - 2 mmol/L    O2 Saturation, Venous 59 (L) 60 - 80 %    Oxyhemoglobin, Venous 58 (L) 60 - 80 %    Hemoglobin, Blood Gas 13.1 12.0 - 15.5 g/dL   COVID/Flu/RSV panel   Result Value Ref Range    Rapid SARS-COV-2 by PCR Not Detected Not Detected / Detected / Presumptive Positive / Inhibitors present    Influenza A by PCR Not Detected Not Detected    Influenza B by PCR Not Detected Not Detected    RSV BY PCR Not Detected Not Detected    Isolation Guidelines      Procedural Comment     Lactic Acid Venous With Reflex   Result Value Ref Range    Lactate, Venous 5.7 (HH) 0.0 - 2.0 mmol/L   TROPONIN I, HIGH SENSITIVITY   Result Value Ref Range    Troponin I, High Sensitivity 21 <52 ng/L   Urinalysis With Microscopy & Culture If Indicated   Result Value Ref Range    COLOR, URINALYSIS Red     APPEARANCE, URINALYSIS Turbid     GLUCOSE, URINALYSIS Negative Negative mg/dL    BILIRUBIN, URINALYSIS Unable to interpret due to interfering substances (A) Negative    KETONES, URINALYSIS Unable to interpret due to interfering substances (A) Negative mg/dL    SPECIFIC GRAVITY, URINALYSIS 1.025 1.005 - 1.030    OCCULT BLOOD, URINALYSIS Large (A) Negative    PH, URINALYSIS 7.5 (H) 5.0 - 7.0    PROTEIN, URINALYSIS Unable to interpret due to interfering substances (A) Negative mg/dL     UROBILINOGEN, URINALYSIS Unable to interpret due to interfering substances (A) 0.2, 1.0 mg/dL    NITRITE, URINALYSIS Unable to interpret due to interfering substances (A) Negative    LEUKOCYTE ESTERASE, URINALYSIS Unable to interpret due to interfering substances (A) Negative    SQUAMOUS EPITHELIAL, URINALYSIS 1 to 5 None Seen, 1 to 5 /hpf    ERYTHROCYTES, URINALYSIS >100 (A) None Seen, 1 to 2 /hpf    LEUKOCYTES, URINALYSIS 26 to 100 (A) None Seen, 1 to 5 /hpf    BACTERIA, URINALYSIS Moderate (A) None Seen /hpf    HYALINE CASTS, URINALYSIS None Seen None Seen, 1 to 5 /lpf   Lactic Acid, Venous   Result Value Ref Range    Lactate, Venous 3.2 (HH) 0.0 - 2.0 mmol/L   Blood Culture    Specimen: Blood, Venous   Result Value Ref Range    Culture, Blood or Bone Marrow No Growth <24 hours    Blood Culture    Specimen: Blood   Result Value Ref Range    Culture, Blood or Bone Marrow No Growth <24 hours    GLUCOSE, BEDSIDE - POINT OF CARE   Result Value Ref Range    GLUCOSE, BEDSIDE - POINT OF CARE 170 (H) 70 - 99 mg/dL   GLUCOSE, BEDSIDE - POINT OF CARE   Result Value Ref Range    GLUCOSE, BEDSIDE - POINT OF CARE 162 (H) 70 - 99 mg/dL       EKG     See ED course    Radiology Results     Imaging Results          XR CHEST PA OR AP 1 VIEW (Final result)  Result time 11/05/23 12:52:06    Final result                 Impression:      Stable lingular and right basilar platelike atelectasis. No new focal  alveolar lobar consolidation          Electronically Signed by: BLACK CASTLE M.D.   Signed on: 11/5/2023 12:52 PM   Workstation ID: [id number]             Narrative:    EXAM: XR CHEST AP OR PA    CLINICAL INDICATION: Shortness of breath    COMPARISON: 9/25/2023    FINDINGS: Tracheostomy tube thoracic inlet, stable in position. Stable  platelike lingular atelectasis. Cardiac size within normal limits. Right  basilar atelectasis. No new focal lobar consolidation, pleural effusion,  pneumothorax                                 ED Medications     ED Medication Orders (From admission, onward)    Ordered Start     Status Ordering Provider    11/05/23 1151 11/05/23 1300  meropenem (MERREM) 2 g in sodium chloride 0.9 % 100 mL IVPB  ONCE         Last MAR action: Completed DAMARIS BENAVIDES    11/05/23 1154 11/05/23 1200  sodium chloride (NORMAL SALINE) 0.9 % bolus 2,000 mL  ONCE         Last MAR action: Completed DAMARIS BENAVIDES    11/05/23 1151 11/05/23 1150  VANCOMYCIN - PHARMACIST MONITORED Misc  (Pharmacy to dose and monitor vancomycin therapy)  SEE ADMIN INSTRUCTIONS        See Hyperspace for full Linked Orders Report.    Acknowledged DAMARIS BENAVIDES          ED Course     Vitals:    11/05/23 1327 11/05/23 1530 11/05/23 1551 11/05/23 1800   BP: 133/81 115/83 115/83    BP Location:   LUE - Left upper extremity    Patient Position:   Supine    Pulse: 94 96 89 86   Resp: 15  14    Temp:   98.8 °F (37.1 °C)    TempSrc:   Axillary    SpO2: 99% 98% 98% 98%   Weight:   89.8 kg (198 lb)    Height:   5' 6\" (1.676 m)        ED Course as of 11/05/23 1924   Sun Nov 05, 2023   1110 EKG: Sinus tachycardia rate, left axis deviation, normal intervals, no ST elevations per my read. [DM]   1120 ABG shows normal pH, PCO2 is normal.  Bicarb is normal [DM]   1209 Lactate of 5.7 noted.  Patient receiving 30 cc/kg bolus now.  Additionally given that she was treated with meropenem and vancomycin in her previous admission, will start those antibiotics empirically [DM]   1209 Troponin negative. [DM]      ED Course User Index  [DM] Damaris Benavides       Independent Review Completed in ED course      Consults                ED Consults done in the ED course        MDM                               MDM done in ED Course    Does the Patient have sepsis: Severe Sepsis,    SEVERE SEPSIS ED CHECKLIST  Treatment Order Set Used: No  Initial Lactic Acid Drawn: Yes  Repeat Lactic Acid Ordered: Yes  Repeat Lactic Acid Drawn: Yes  Blood Cultures Drawn: Yes  Antibiotics Given:  Yes         Critical Care       No Critical Care        Disposition       Clinical Impression and Diagnosis  7:23 PM 11/05/23     Diagnosis:   1. Sepsis, due to unspecified organism, unspecified whether acute organ dysfunction present (CMD)           Pt to be admitted   Condition on Admission: Stable              Admit 11/5/2023  1:54 PM  Telemetry Bed?: Yes  Admitting Physician: BALDO THIBODEAUX [[id number]]  Is this a telephone or verbal order?: This is a telephone order from the admitting physician  Transferring Patient to? Only adjust for [facility] [[id number]]                   Leo Tineo  Resident  11/05/23 1924     no

## 2023-11-14 ENCOUNTER — OUTPATIENT (OUTPATIENT)
Dept: OUTPATIENT SERVICES | Facility: HOSPITAL | Age: 38
LOS: 1 days | End: 2023-11-14
Payer: MEDICAID

## 2023-11-14 ENCOUNTER — APPOINTMENT (OUTPATIENT)
Dept: INFUSION THERAPY | Facility: CLINIC | Age: 38
End: 2023-11-14

## 2023-11-14 VITALS
RESPIRATION RATE: 16 BRPM | DIASTOLIC BLOOD PRESSURE: 63 MMHG | SYSTOLIC BLOOD PRESSURE: 102 MMHG | TEMPERATURE: 98.4 F | HEART RATE: 71 BPM

## 2023-11-14 DIAGNOSIS — Z85.07 PERSONAL HISTORY OF MALIGNANT NEOPLASM OF PANCREAS: Chronic | ICD-10-CM

## 2023-11-14 DIAGNOSIS — K86.89 OTHER SPECIFIED DISEASES OF PANCREAS: ICD-10-CM

## 2023-11-14 PROCEDURE — 96365 THER/PROPH/DIAG IV INF INIT: CPT

## 2023-11-14 RX ORDER — IRON SUCROSE 20 MG/ML
200 INJECTION, SOLUTION INTRAVENOUS ONCE
Refills: 0 | Status: COMPLETED | OUTPATIENT
Start: 2023-11-14 | End: 2023-11-14

## 2023-11-14 RX ADMIN — IRON SUCROSE 200 MILLIGRAM(S): 20 INJECTION, SOLUTION INTRAVENOUS at 17:15

## 2023-11-14 RX ADMIN — IRON SUCROSE 220 MILLIGRAM(S): 20 INJECTION, SOLUTION INTRAVENOUS at 17:15

## 2023-11-15 DIAGNOSIS — K86.89 OTHER SPECIFIED DISEASES OF PANCREAS: ICD-10-CM

## 2023-11-16 ENCOUNTER — RESULT REVIEW (OUTPATIENT)
Age: 38
End: 2023-11-16

## 2023-11-16 ENCOUNTER — OUTPATIENT (OUTPATIENT)
Dept: OUTPATIENT SERVICES | Facility: HOSPITAL | Age: 38
LOS: 1 days | End: 2023-11-16
Payer: MEDICAID

## 2023-11-16 DIAGNOSIS — R42 DIZZINESS AND GIDDINESS: ICD-10-CM

## 2023-11-16 DIAGNOSIS — R51.9 HEADACHE, UNSPECIFIED: ICD-10-CM

## 2023-11-16 DIAGNOSIS — Z85.07 PERSONAL HISTORY OF MALIGNANT NEOPLASM OF PANCREAS: Chronic | ICD-10-CM

## 2023-11-16 PROCEDURE — 70552 MRI BRAIN STEM W/DYE: CPT | Mod: 26

## 2023-11-16 PROCEDURE — 70552 MRI BRAIN STEM W/DYE: CPT

## 2023-11-16 PROCEDURE — 70544 MR ANGIOGRAPHY HEAD W/O DYE: CPT | Mod: 26,59

## 2023-11-16 PROCEDURE — 70544 MR ANGIOGRAPHY HEAD W/O DYE: CPT

## 2023-11-16 PROCEDURE — A9579: CPT

## 2023-11-17 DIAGNOSIS — R42 DIZZINESS AND GIDDINESS: ICD-10-CM

## 2023-11-17 DIAGNOSIS — R51.9 HEADACHE, UNSPECIFIED: ICD-10-CM

## 2023-11-21 ENCOUNTER — OUTPATIENT (OUTPATIENT)
Dept: OUTPATIENT SERVICES | Facility: HOSPITAL | Age: 38
LOS: 1 days | End: 2023-11-21
Payer: MEDICAID

## 2023-11-21 ENCOUNTER — APPOINTMENT (OUTPATIENT)
Dept: INFUSION THERAPY | Facility: CLINIC | Age: 38
End: 2023-11-21

## 2023-11-21 VITALS
SYSTOLIC BLOOD PRESSURE: 110 MMHG | DIASTOLIC BLOOD PRESSURE: 69 MMHG | HEART RATE: 73 BPM | RESPIRATION RATE: 13 BRPM | TEMPERATURE: 99 F

## 2023-11-21 DIAGNOSIS — Z85.07 PERSONAL HISTORY OF MALIGNANT NEOPLASM OF PANCREAS: Chronic | ICD-10-CM

## 2023-11-21 DIAGNOSIS — K86.89 OTHER SPECIFIED DISEASES OF PANCREAS: ICD-10-CM

## 2023-11-21 PROCEDURE — 96365 THER/PROPH/DIAG IV INF INIT: CPT

## 2023-11-21 RX ORDER — IRON SUCROSE 20 MG/ML
200 INJECTION, SOLUTION INTRAVENOUS ONCE
Refills: 0 | Status: COMPLETED | OUTPATIENT
Start: 2023-11-21 | End: 2023-11-21

## 2023-11-21 RX ADMIN — IRON SUCROSE 200 MILLIGRAM(S): 20 INJECTION, SOLUTION INTRAVENOUS at 16:05

## 2023-11-21 RX ADMIN — IRON SUCROSE 220 MILLIGRAM(S): 20 INJECTION, SOLUTION INTRAVENOUS at 16:04

## 2023-11-28 ENCOUNTER — APPOINTMENT (OUTPATIENT)
Dept: INFUSION THERAPY | Facility: CLINIC | Age: 38
End: 2023-11-28

## 2023-11-28 ENCOUNTER — OUTPATIENT (OUTPATIENT)
Dept: OUTPATIENT SERVICES | Facility: HOSPITAL | Age: 38
LOS: 1 days | End: 2023-11-28
Payer: MEDICAID

## 2023-11-28 DIAGNOSIS — K86.89 OTHER SPECIFIED DISEASES OF PANCREAS: ICD-10-CM

## 2023-11-28 DIAGNOSIS — Z85.07 PERSONAL HISTORY OF MALIGNANT NEOPLASM OF PANCREAS: Chronic | ICD-10-CM

## 2023-11-28 PROCEDURE — 96365 THER/PROPH/DIAG IV INF INIT: CPT

## 2023-11-28 RX ORDER — IRON SUCROSE 20 MG/ML
200 INJECTION, SOLUTION INTRAVENOUS ONCE
Refills: 0 | Status: COMPLETED | OUTPATIENT
Start: 2023-11-28 | End: 2023-11-28

## 2023-11-28 RX ADMIN — IRON SUCROSE 110 MILLIGRAM(S): 20 INJECTION, SOLUTION INTRAVENOUS at 16:27

## 2023-12-01 ENCOUNTER — APPOINTMENT (OUTPATIENT)
Dept: NEUROLOGY | Facility: CLINIC | Age: 38
End: 2023-12-01
Payer: MEDICAID

## 2023-12-01 VITALS
BODY MASS INDEX: 28.12 KG/M2 | SYSTOLIC BLOOD PRESSURE: 112 MMHG | HEART RATE: 83 BPM | DIASTOLIC BLOOD PRESSURE: 74 MMHG | WEIGHT: 175 LBS | HEIGHT: 66 IN

## 2023-12-01 DIAGNOSIS — H53.8 OTHER VISUAL DISTURBANCES: ICD-10-CM

## 2023-12-01 DIAGNOSIS — R51.9 HEADACHE, UNSPECIFIED: ICD-10-CM

## 2023-12-01 DIAGNOSIS — M54.2 CERVICALGIA: ICD-10-CM

## 2023-12-01 DIAGNOSIS — G43.909 MIGRAINE, UNSPECIFIED, NOT INTRACTABLE, W/OUT STATUS MIGRAINOSUS: ICD-10-CM

## 2023-12-01 PROCEDURE — 99214 OFFICE O/P EST MOD 30 MIN: CPT

## 2023-12-01 RX ORDER — TIZANIDINE HYDROCHLORIDE 4 MG/1
4 CAPSULE ORAL EVERY 8 HOURS
Qty: 90 | Refills: 0 | Status: ACTIVE | COMMUNITY
Start: 2023-12-01 | End: 1900-01-01

## 2024-02-02 ENCOUNTER — APPOINTMENT (OUTPATIENT)
Dept: HEMATOLOGY ONCOLOGY | Facility: CLINIC | Age: 39
End: 2024-02-02

## 2024-02-05 ENCOUNTER — APPOINTMENT (OUTPATIENT)
Dept: NEUROLOGY | Facility: CLINIC | Age: 39
End: 2024-02-05

## 2024-02-06 ENCOUNTER — APPOINTMENT (OUTPATIENT)
Dept: HEMATOLOGY ONCOLOGY | Facility: CLINIC | Age: 39
End: 2024-02-06

## 2024-02-15 ENCOUNTER — OUTPATIENT (OUTPATIENT)
Dept: OUTPATIENT SERVICES | Facility: HOSPITAL | Age: 39
LOS: 1 days | End: 2024-02-15
Payer: MEDICAID

## 2024-02-15 ENCOUNTER — APPOINTMENT (OUTPATIENT)
Dept: HEMATOLOGY ONCOLOGY | Facility: CLINIC | Age: 39
End: 2024-02-15
Payer: MEDICAID

## 2024-02-15 VITALS
DIASTOLIC BLOOD PRESSURE: 61 MMHG | HEART RATE: 61 BPM | HEIGHT: 66 IN | SYSTOLIC BLOOD PRESSURE: 108 MMHG | BODY MASS INDEX: 28.77 KG/M2 | WEIGHT: 179 LBS | RESPIRATION RATE: 16 BRPM

## 2024-02-15 DIAGNOSIS — K86.89 OTHER SPECIFIED DISEASES OF PANCREAS: ICD-10-CM

## 2024-02-15 DIAGNOSIS — Z85.07 PERSONAL HISTORY OF MALIGNANT NEOPLASM OF PANCREAS: Chronic | ICD-10-CM

## 2024-02-15 DIAGNOSIS — C80.1 MALIGNANT (PRIMARY) NEOPLASM, UNSPECIFIED: ICD-10-CM

## 2024-02-15 LAB
BASOPHILS # BLD AUTO: 0.02 K/UL
BASOPHILS NFR BLD AUTO: 0.3 %
EOSINOPHIL # BLD AUTO: 0.23 K/UL
EOSINOPHIL NFR BLD AUTO: 3.5 %
HCT VFR BLD CALC: 38.9 %
HGB BLD-MCNC: 13.2 G/DL
IMM GRANULOCYTES NFR BLD AUTO: 0.5 %
LYMPHOCYTES # BLD AUTO: 1.77 K/UL
LYMPHOCYTES NFR BLD AUTO: 26.6 %
MAN DIFF?: NORMAL
MCHC RBC-ENTMCNC: 30.4 PG
MCHC RBC-ENTMCNC: 33.9 G/DL
MCV RBC AUTO: 89.6 FL
MONOCYTES # BLD AUTO: 0.67 K/UL
MONOCYTES NFR BLD AUTO: 10.1 %
NEUTROPHILS # BLD AUTO: 3.94 K/UL
NEUTROPHILS NFR BLD AUTO: 59 %
PLATELET # BLD AUTO: 280 K/UL
PMV BLD AUTO: 0 /100 WBCS
RBC # BLD: 4.34 M/UL
RBC # FLD: 13.3 %
WBC # FLD AUTO: 6.66 K/UL

## 2024-02-15 PROCEDURE — 99213 OFFICE O/P EST LOW 20 MIN: CPT

## 2024-02-15 PROCEDURE — 80053 COMPREHEN METABOLIC PANEL: CPT

## 2024-02-15 PROCEDURE — 85027 COMPLETE CBC AUTOMATED: CPT

## 2024-02-15 PROCEDURE — 82728 ASSAY OF FERRITIN: CPT

## 2024-02-16 DIAGNOSIS — K86.89 OTHER SPECIFIED DISEASES OF PANCREAS: ICD-10-CM

## 2024-02-16 LAB
ALBUMIN SERPL ELPH-MCNC: 4.5 G/DL
ALP BLD-CCNC: 130 U/L
ALT SERPL-CCNC: 28 U/L
ANION GAP SERPL CALC-SCNC: 12 MMOL/L
AST SERPL-CCNC: 20 U/L
BILIRUB SERPL-MCNC: 0.3 MG/DL
BUN SERPL-MCNC: 10 MG/DL
CALCIUM SERPL-MCNC: 9.2 MG/DL
CHLORIDE SERPL-SCNC: 103 MMOL/L
CO2 SERPL-SCNC: 23 MMOL/L
CREAT SERPL-MCNC: 0.5 MG/DL
EGFR: 124 ML/MIN/1.73M2
FERRITIN SERPL-MCNC: 83 NG/ML
GLUCOSE SERPL-MCNC: 89 MG/DL
POTASSIUM SERPL-SCNC: 4.8 MMOL/L
PROT SERPL-MCNC: 7.4 G/DL
SODIUM SERPL-SCNC: 138 MMOL/L

## 2024-02-18 PROBLEM — C80.1: Status: ACTIVE | Noted: 2018-03-05

## 2024-02-18 NOTE — HISTORY OF PRESENT ILLNESS
[de-identified] : \par  32 yo female , Welsh speaking, born in [place] , interviewed with  # [id number]. Patient with  with no significant  significant medical history . S/p pancreaticoduodenectomy, pylorus-preserving R0 resection of  for pancreatic mass diagnosed when she presented with epigastric and periumbilical swelling for 3 months.  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  surgery complicated with a sepsis - possibly from aspiration pneumonia. \par  type A pancreatic fistula and early resolution of delayed gastric emptying. She continues to have drainage 10 to 20cc daily . She denies fever, nausea, vomiting , abdominal pain or diarrhea. \par  \par  \par  \par   [de-identified] : 06/14/2018 patient returns for follow up , she feels well , no abdominal pain or weight loss. no nausea or vomiting . Blood work was notable for mild normocytic anemia , Ferritin 38 . She denies hematochezia or heavy menses.   08/21/2018 : Patient returns for follow up , she is on iron one daily , Hb improved to 12. She complains of slight increase in epigastric discomfort , no nausea, vomiting , pruritis, change in bowel habits. no weight loss.   06/03/2019 Patient returns for history of pancreatic cancer s/p Whipple's with no evidence of disease. Iron deficiency anemia s/p venofer .She feels well and denies any GI symptoms . no weight loss, nausea vomiting . Recent blood work shows slightly elevated alkaline phosphatase .  01/27/2020 Patient returns with chief complaint of interscapular pain for 2 to 3 weeks , worse with activity and present at night . 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 She reports urinary frequency without dysuria , change in bowel habits , leg weakness or numbness.   02/10/2020 Patient returns for history of pancreatic cancer s/p Whipple's with no evidence of disease. Ferritin of 8 noted on 01/27/2020. On venofer. Her interscapular pain is better with cyclobenzaprine. X ray did not reveal any fractures. She complains of increased urinary frequency. No fevers.  Her menses are regular and normal.   6/11/2020: SHIELA VERMA is a 35 year old female here today for follow up visit for history of EDWIN and pancreatic cancer s/p Whipples. She feels well besdies mild fatigue. She denies any GI symptoms,  weight loss, nausea, vomiting, or hematochezia. she received Venofer 200mg IV x5 doses; last dose was Feb 20, 2020. She complains of urticaria around her areolas which started about a week ago. Last mammogram was May 2019 which showed no evidence for malignancy. CBC reviewed normocytic anemia noted. Will recheck iron studies and ferritin on 6/11/2020 9/17/2020: SHIELA VERMA is a 35 year old female here today for follow up visit for history of EDWIN and pancreatic cancer s/p Whipples. She feels well besides mild fatigue. She denies any GI symptoms,  weight loss, nausea, vomiting, or hematochezia. she received Venofer 200mg IV x5 doses; last dose was Feb 20, 2020. CT C/A/P was completed on 9/8/2020 CT A/P was negative for disease; post surgical changes from Whipple procedure. CT of chest showed :  IMPRESSION: Since January 23, 2018 Nonspecific 2 mm subpleural nodule in the right lower lobe which may represent a focus of scarring/atelectasis Scattered 2 mm nodules in the right upper lobe which may be postinflammatory in etiology. Patient denies cough at this time. She states that she does have mild SOB on exertion.   2/2/2021: Patient presents for follow up. Complains of difficulty breathing for several weeks. Upon further clarification, denies overt shortness of breath or cough. Has nasal congestion. Also complains of posterior neck and paraspinal upper back pain and sensation of warmth for about the same length of time. Denies nausea, vomiting, anorexia. She is asking to have her blood levels checked because of her history of iron deficiency (last received venofer in 2/2020). Her PMD checked labs in 12/2020, and she had hemoglobin of 12 with 16% saturation and ferritin of 25. Patient does not take oral iron.  05/04/2021: SHIELA is here for follow up visit for history of pancreatic cancer s/p Whipple procedure. In addition, she has EDWIN; not currently on iron supplement at this time. She denies weight loss, abdominal pain, increased fatigue, SOB, or hematochezia at this time. She does complain of increased nasal congestion. She was seen by ENT who started her on a course of doxycycline, steroids, and fluticasone. Posterior neck and paraspinal pain has improved. Bone scan was completed in March 2021 which was normal.   11/02/21 Pt returns for f/u today . She reports feeling fine . Pt was admitted to hospital with headache and dizziness . She had MRI that showed : IMPRESSION: 1. No MRI evidence of intracranial metastatic disease. No acute infarct or intracranial hemorrhage. 2. Few scattered small/punctate foci of white matter signal abnormality which are nonspecific in etiology. Pt was seen by neurology and was given sumtriptan for migraines . Pt reports that she takes it on and off and her headaches have resolved . She denies any fever , chills, weight loss , loss of appetite and abdominal pain . Her sister was recently diagnosed with uterine mass ? and is being worked up by Gyn . Pt also reports that her mother had uterine cancer 20 years ago , and recently diagnosed with bladder cancer and had surgery done . Pt reports that she was seen by Gyn last year and everything was fine .   4/26/22- Patient is here for follow up visit. She is doing well with no new c/o abdominal pain, weight loss, OLESYA. Her migraines are better as well. No other new issues   11/8/2022 Patient returns for follow up , she had negative biopsy of mammographically detected left breast lesion . She feels well and denies any abdominal pain , increased in girth , change in bowel habits.   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 Blood work was normal including Hgb . she is scheduled to see neurology . she had similar episode in 2021.   10/10/2023 Patient returns for follow up , last CT scan showed no evidence of disease . Since her last visit she had another episode of pressure in the back of the head lasting for few minutes , no associated visual symptoms this time . She was previously suspected with migraine . MRI of head is negative and is awaiting MRA .   2/15/2024: Patient returns for follow up for iron deficiency secondary to heavy menses and a history of pancreatic cancer (less than 5 years ago s/p Whipple). She feels overall well today, no new complaints at this time. Her last CT scans were negative for disease. She was seen by neurology in December for her blurry visions, MRA unremarkable. She has not had any more episodes of blurry vision since.

## 2024-02-18 NOTE — PHYSICAL EXAM
[Normal] : grossly intact [de-identified] : healed scar of surgery , no tenderness or redness. no palpable masses.  [de-identified] : no focal deficits .

## 2024-02-18 NOTE — ASSESSMENT
[FreeTextEntry1] : 38 year old female   #History of pancreatic cancer, s/p Whipple ~6 years ago - Last CT (April 2023): TARYN  #Iron deficiency anemia, secondary to heavy menses  - Intolerant to PO iron, responsive to IV  - S/P Venofer in November 2023  #Headaches, blurry vision - Following with neurology, MRI and MRA negative   PLAN: --Will check CBC, CMP, ferritin today --Will arrange for additional IV iron if needed  --No additional CT scans at this time  --Continue with age-appropriate screenings --Follow up with neurology as scheduled   RTC in 6 months   Patient was seen and examined and discussed with Dr. Murray who agrees to the above plan of care.

## 2024-02-20 ENCOUNTER — EMERGENCY (EMERGENCY)
Facility: HOSPITAL | Age: 39
LOS: 0 days | Discharge: HOME | End: 2024-02-21
Attending: EMERGENCY MEDICINE
Payer: MEDICAID

## 2024-02-20 VITALS
HEART RATE: 66 BPM | WEIGHT: 179.9 LBS | SYSTOLIC BLOOD PRESSURE: 124 MMHG | RESPIRATION RATE: 20 BRPM | OXYGEN SATURATION: 100 % | TEMPERATURE: 98 F | DIASTOLIC BLOOD PRESSURE: 59 MMHG

## 2024-02-20 DIAGNOSIS — Z85.07 PERSONAL HISTORY OF MALIGNANT NEOPLASM OF PANCREAS: Chronic | ICD-10-CM

## 2024-02-20 DIAGNOSIS — R09.81 NASAL CONGESTION: ICD-10-CM

## 2024-02-20 DIAGNOSIS — J18.9 PNEUMONIA, UNSPECIFIED ORGANISM: ICD-10-CM

## 2024-02-20 DIAGNOSIS — R07.81 PLEURODYNIA: ICD-10-CM

## 2024-02-20 LAB
ALBUMIN SERPL ELPH-MCNC: 4.5 G/DL — SIGNIFICANT CHANGE UP (ref 3.5–5.2)
ALP SERPL-CCNC: 109 U/L — SIGNIFICANT CHANGE UP (ref 30–115)
ALT FLD-CCNC: 24 U/L — SIGNIFICANT CHANGE UP (ref 0–41)
ANION GAP SERPL CALC-SCNC: 11 MMOL/L — SIGNIFICANT CHANGE UP (ref 7–14)
AST SERPL-CCNC: 21 U/L — SIGNIFICANT CHANGE UP (ref 0–41)
BASOPHILS # BLD AUTO: 0.02 K/UL — SIGNIFICANT CHANGE UP (ref 0–0.2)
BASOPHILS NFR BLD AUTO: 0.3 % — SIGNIFICANT CHANGE UP (ref 0–1)
BILIRUB SERPL-MCNC: 0.3 MG/DL — SIGNIFICANT CHANGE UP (ref 0.2–1.2)
BUN SERPL-MCNC: 15 MG/DL — SIGNIFICANT CHANGE UP (ref 10–20)
CALCIUM SERPL-MCNC: 9.2 MG/DL — SIGNIFICANT CHANGE UP (ref 8.4–10.5)
CHLORIDE SERPL-SCNC: 103 MMOL/L — SIGNIFICANT CHANGE UP (ref 98–110)
CO2 SERPL-SCNC: 24 MMOL/L — SIGNIFICANT CHANGE UP (ref 17–32)
CREAT SERPL-MCNC: 0.5 MG/DL — LOW (ref 0.7–1.5)
D DIMER BLD IA.RAPID-MCNC: <150 NG/ML DDU — SIGNIFICANT CHANGE UP
EGFR: 123 ML/MIN/1.73M2 — SIGNIFICANT CHANGE UP
EOSINOPHIL # BLD AUTO: 0.18 K/UL — SIGNIFICANT CHANGE UP (ref 0–0.7)
EOSINOPHIL NFR BLD AUTO: 2.6 % — SIGNIFICANT CHANGE UP (ref 0–8)
GLUCOSE SERPL-MCNC: 106 MG/DL — HIGH (ref 70–99)
HCG SERPL QL: NEGATIVE — SIGNIFICANT CHANGE UP
HCT VFR BLD CALC: 36.4 % — LOW (ref 37–47)
HGB BLD-MCNC: 12.4 G/DL — SIGNIFICANT CHANGE UP (ref 12–16)
IMM GRANULOCYTES NFR BLD AUTO: 0.3 % — SIGNIFICANT CHANGE UP (ref 0.1–0.3)
LYMPHOCYTES # BLD AUTO: 2.72 K/UL — SIGNIFICANT CHANGE UP (ref 1.2–3.4)
LYMPHOCYTES # BLD AUTO: 38.9 % — SIGNIFICANT CHANGE UP (ref 20.5–51.1)
MAGNESIUM SERPL-MCNC: 2 MG/DL — SIGNIFICANT CHANGE UP (ref 1.8–2.4)
MCHC RBC-ENTMCNC: 29.8 PG — SIGNIFICANT CHANGE UP (ref 27–31)
MCHC RBC-ENTMCNC: 34.1 G/DL — SIGNIFICANT CHANGE UP (ref 32–37)
MCV RBC AUTO: 87.5 FL — SIGNIFICANT CHANGE UP (ref 81–99)
MONOCYTES # BLD AUTO: 0.62 K/UL — HIGH (ref 0.1–0.6)
MONOCYTES NFR BLD AUTO: 8.9 % — SIGNIFICANT CHANGE UP (ref 1.7–9.3)
NEUTROPHILS # BLD AUTO: 3.43 K/UL — SIGNIFICANT CHANGE UP (ref 1.4–6.5)
NEUTROPHILS NFR BLD AUTO: 49 % — SIGNIFICANT CHANGE UP (ref 42.2–75.2)
NRBC # BLD: 0 /100 WBCS — SIGNIFICANT CHANGE UP (ref 0–0)
NT-PROBNP SERPL-SCNC: <5 PG/ML — SIGNIFICANT CHANGE UP (ref 0–300)
PLATELET # BLD AUTO: 274 K/UL — SIGNIFICANT CHANGE UP (ref 130–400)
PMV BLD: 10.5 FL — HIGH (ref 7.4–10.4)
POTASSIUM SERPL-MCNC: 4.4 MMOL/L — SIGNIFICANT CHANGE UP (ref 3.5–5)
POTASSIUM SERPL-SCNC: 4.4 MMOL/L — SIGNIFICANT CHANGE UP (ref 3.5–5)
PROT SERPL-MCNC: 7.3 G/DL — SIGNIFICANT CHANGE UP (ref 6–8)
RBC # BLD: 4.16 M/UL — LOW (ref 4.2–5.4)
RBC # FLD: 13.5 % — SIGNIFICANT CHANGE UP (ref 11.5–14.5)
SODIUM SERPL-SCNC: 138 MMOL/L — SIGNIFICANT CHANGE UP (ref 135–146)
TROPONIN T, HIGH SENSITIVITY RESULT: <6 NG/L — SIGNIFICANT CHANGE UP (ref 6–13)
WBC # BLD: 6.99 K/UL — SIGNIFICANT CHANGE UP (ref 4.8–10.8)
WBC # FLD AUTO: 6.99 K/UL — SIGNIFICANT CHANGE UP (ref 4.8–10.8)

## 2024-02-20 PROCEDURE — 93010 ELECTROCARDIOGRAM REPORT: CPT

## 2024-02-20 PROCEDURE — 99285 EMERGENCY DEPT VISIT HI MDM: CPT | Mod: 25

## 2024-02-20 PROCEDURE — 36415 COLL VENOUS BLD VENIPUNCTURE: CPT

## 2024-02-20 PROCEDURE — 84703 CHORIONIC GONADOTROPIN ASSAY: CPT

## 2024-02-20 PROCEDURE — 85379 FIBRIN DEGRADATION QUANT: CPT

## 2024-02-20 PROCEDURE — 80053 COMPREHEN METABOLIC PANEL: CPT

## 2024-02-20 PROCEDURE — 71046 X-RAY EXAM CHEST 2 VIEWS: CPT

## 2024-02-20 PROCEDURE — 83880 ASSAY OF NATRIURETIC PEPTIDE: CPT

## 2024-02-20 PROCEDURE — 83735 ASSAY OF MAGNESIUM: CPT

## 2024-02-20 PROCEDURE — 85025 COMPLETE CBC W/AUTO DIFF WBC: CPT

## 2024-02-20 PROCEDURE — 96374 THER/PROPH/DIAG INJ IV PUSH: CPT

## 2024-02-20 PROCEDURE — 93005 ELECTROCARDIOGRAM TRACING: CPT

## 2024-02-20 PROCEDURE — 71046 X-RAY EXAM CHEST 2 VIEWS: CPT | Mod: 26

## 2024-02-20 PROCEDURE — 99284 EMERGENCY DEPT VISIT MOD MDM: CPT

## 2024-02-20 PROCEDURE — 84484 ASSAY OF TROPONIN QUANT: CPT

## 2024-02-20 RX ORDER — KETOROLAC TROMETHAMINE 30 MG/ML
15 SYRINGE (ML) INJECTION ONCE
Refills: 0 | Status: DISCONTINUED | OUTPATIENT
Start: 2024-02-20 | End: 2024-02-20

## 2024-02-20 RX ADMIN — Medication 15 MILLIGRAM(S): at 22:55

## 2024-02-20 NOTE — ED ADULT TRIAGE NOTE - CHIEF COMPLAINT QUOTE
For the past three days when she breathes in her chest hurts, she can only breathe through her nose. And she has a little pain in her back   - son   Patient denies cough, denies congestion, denies trauma

## 2024-02-20 NOTE — ED PROVIDER NOTE - PATIENT PORTAL LINK FT
You can access the FollowMyHealth Patient Portal offered by [facility] by registering at the following website: [email or web address]. By joining [employer] [employer]’s FollowMyHealth portal, you will also be able to view your health information using other applications (apps) compatible with our system.

## 2024-02-20 NOTE — ED PROVIDER NOTE - PHYSICAL EXAMINATION
Physical Exam    Vital Signs: I have reviewed the initial vital signs.  Constitutional: well-nourished, appears stated age, no acute distress  Eyes: Conjunctiva pink, Sclera clear  Cardiovascular: S1 and S2, regular rate, regular rhythm, well-perfused extremities, radial pulses equal and 2+ b/l.   Respiratory: unlabored respiratory effort, clear to auscultation bilaterally no wheezing, rales and rhonchi. pt is speaking full sentences. no accessory muscle use.   Gastrointestinal: soft, non-tender, nondistended abdomen, no pulsatile mass, no rebound, no guarding  Musculoskeletal: supple neck, no lower extremity edema, no calf tenderness, no midline tenderness, no palpable spinal step offs  Integumentary: warm, dry, no rash  Neurologic: awake, alert, extremities’ motor and sensory functions grossly intact.  Psychiatric: appropriate mood, appropriate affect

## 2024-02-20 NOTE — ED PROVIDER NOTE - NSFOLLOWUPINSTRUCTIONS_ED_ALL_ED_FT
Nonspecific Chest Pain, Adult  Chest pain is an uncomfortable, tight, or painful feeling in the chest. The pain can feel like a crushing, aching, or squeezing pressure. A person can feel a burning or tingling sensation. Chest pain can also be felt in your back, neck, jaw, shoulder, or arm. This pain can be worse when you move, sneeze, or take a deep breath.    Chest pain can be caused by a condition that is life-threatening. This must be treated right away. It can also be caused by something that is not life-threatening. If you have chest pain, it can be hard to know the difference, so it is important to get help right away to make sure that you do not have a serious condition.    Some life-threatening causes of chest pain include:  Heart attack.  A tear in the body's main blood vessel (aortic dissection).  Inflammation around your heart (pericarditis).  A problem in the lungs, such as a blood clot (pulmonary embolism) or a collapsed lung (pneumothorax).  Some non life-threatening causes of chest pain include:  Heartburn.  Anxiety or stress.  Damage to the bones, muscles, and cartilage that make up your chest wall.  Pneumonia or bronchitis.  Shingles infection (varicella-zoster virus).  Your chest pain may come and go. It may also be constant. Your health care provider will do tests and other studies to find the cause of your pain. Treatment will depend on the cause of your chest pain.    Follow these instructions at home:  Medicines    Take over-the-counter and prescription medicines only as told by your health care provider.  If you were prescribed an antibiotic medicine, take it as told by your health care provider. Do not stop taking the antibiotic even if you start to feel better.  Activity    Avoid any activities that cause chest pain.  Do not lift anything that is heavier than 10 lb (4.5 kg), or the limit that you are told, until your health care provider says that it is safe.  Rest as directed by your health care provider.  Return to your normal activities only as told by your health care provider. Ask your health care provider what activities are safe for you.  Lifestyle    A plate along with examples of foods in a healthy diet.  Silhouette of a person sitting on the floor doing yoga.  Do not use any products that contain nicotine or tobacco, such as cigarettes, e-cigarettes, and chewing tobacco. If you need help quitting, ask your health care provider.  Do not drink alcohol.  Make healthy lifestyle changes as recommended. These may include:  Getting regular exercise. Ask your health care provider to suggest some exercises that are safe for you.  Eating a heart-healthy diet. This includes plenty of fresh fruits and vegetables, whole grains, low-fat (lean) protein, and low-fat dairy products. A dietitian can help you find healthy eating options.  Maintaining a healthy weight.  Managing any other health conditions you may have, such as high blood pressure (hypertension) or diabetes.  Reducing stress, such as with yoga or relaxation techniques.  General instructions    Pay attention to any changes in your symptoms.  It is up to you to get the results of any tests that were done. Ask your health care provider, or the department that is doing the tests, when your results will be ready.  Keep all follow-up visits as told by your health care provider. This is important.  You may be asked to go for further testing if your chest pain does not go away.  Contact a health care provider if:  Your chest pain does not go away.  You feel depressed.  You have a fever.  You notice changes in your symptoms or develop new symptoms.  Get help right away if:  Your chest pain gets worse.  You have a cough that gets worse, or you cough up blood.  You have severe pain in your abdomen.  You faint.  You have sudden, unexplained chest discomfort.  You have sudden, unexplained discomfort in your arms, back, neck, or jaw.  You have shortness of breath at any time.  You suddenly start to sweat, or your skin gets clammy.  You feel nausea or you vomit.  You suddenly feel lightheaded or dizzy.  You have severe weakness, or unexplained weakness or fatigue.  Your heart begins to beat quickly, or it feels like it is skipping beats.  These symptoms may represent a serious problem that is an emergency. Do not wait to see if the symptoms will go away. Get medical help right away. Call your local emergency services (911 in the [place] Do not drive yourself to the hospital.    Summary  Chest pain can be caused by a condition that is serious and requires urgent treatment. It may also be caused by something that is not life-threatening.  Your health care provider may do lab tests and other studies to find the cause of your pain.  Follow your health care provider's instructions on taking medicines, making lifestyle changes, and getting emergency treatment if symptoms become worse.  Keep all follow-up visits as told by your health care provider. This includes visits for any further testing if your chest pain does not go away.  This information is not intended to replace advice given to you by your health care provider. Make sure you discuss any questions you have with your health care provider.    Pneumonia    WHAT YOU NEED TO KNOW:    Pneumonia is an infection in your lungs caused by bacteria, viruses, fungi, or parasites. You can become infected if you come in contact with someone who is sick. You can get pneumonia if you recently had surgery or needed a ventilator to help you breathe. Pneumonia can also be caused by accidentally inhaling saliva or small pieces of food. Pneumonia may cause mild symptoms, or it can be severe and life-threatening. The Lungs         DISCHARGE INSTRUCTIONS:    Return to the emergency department if:     You cough up blood.       Your heart beats more than 100 beats in 1 minute.       You are very tired, confused, and cannot think clearly.      You have chest pain or trouble breathing.       Your lips or fingernails turn gray or blue.     Contact your healthcare provider if:     Your symptoms are the same or get worse 48 hours after you start antibiotics.      Your fever is not below 99°F (37.2°C) 48 hours after you start antibiotics.       You have a fever higher than 101°F (38.3°C).       You cannot eat, or you have loss of appetite, nausea, or are vomiting.      You have questions or concerns about your condition or care.    Medicines:     Antibiotics treat pneumonia caused by bacteria.      Acetaminophen decreases pain and fever. It is available without a doctor's order. Ask how much to take and how often to take it. Follow directions. Read the labels of all other medicines you are using to see if they also contain acetaminophen, or ask your doctor or pharmacist. Acetaminophen can cause liver damage if not taken correctly. Do not use more than 4 grams (4,000 milligrams) total of acetaminophen in one day.       NSAIDs, such as ibuprofen, help decrease swelling, pain, and fever. This medicine is available with or without a doctor's order. NSAIDs can cause stomach bleeding or kidney problems in certain people. If you take blood thinner medicine, always ask your healthcare provider if NSAIDs are safe for you. Always read the medicine label and follow directions.      Take your medicine as directed. Contact your healthcare provider if you think your medicine is not helping or if you have side effects. Tell him or her if you are allergic to any medicine. Keep a list of the medicines, vitamins, and herbs you take. Include the amounts, and when and why you take them. Bring the list or the pill bottles to follow-up visits. Carry your medicine list with you in case of an emergency.    Follow up with your healthcare provider as directed: You will need to return for more tests. Write down your questions so you remember to ask them during your visits.     Manage your symptoms:     Rest as needed. Rest often throughout the day. Alternate times of activity with times of rest.      Drink liquids as directed. Ask how much liquid to drink each day and which liquids are best for you. Liquids help thin your mucus, which may make it easier for you to cough it up.       Do not smoke. Avoid secondhand smoke. Smoking increases your risk for pneumonia. Smoking also makes it harder for you to get better after you have had pneumonia. Ask your healthcare provider for information if you need help to quit smoking.       Use a cool mist humidifier. A humidifier will help increase air moisture in your home. This may make it easier for you to breathe and help decrease your cough.       Keep your head elevated. You may be able to breathe better if you lie down with the head of your bed up.     Prevent pneumonia:     Prevent the spread of germs. Wash your hands often with soap and water. Use gel hand cleanser when there is no soap and water available. Do not touch your eyes, nose, or mouth unless you have washed your hands first. Cover your mouth when you cough. Cough into a tissue or your shirtsleeve so you do not spread germs from your hands. If you are sick, stay away from others as much as possible. Handwashing           Limit alcohol. Women should limit alcohol to 1 drink a day. Men should limit alcohol to 2 drinks a day. A drink of alcohol is 12 ounces of beer, 5 ounces of wine, or 1½ ounces of liquor.      Ask about vaccines. You may need a vaccine to help prevent pneumonia. Get an influenza (flu) vaccine every year as soon as it becomes available.          © Copyright "[employer]" 2019 All illustrations and images included in CareNotes are the copyrighted property of [employer] or [employer].

## 2024-02-20 NOTE — ED PROVIDER NOTE - CLINICAL SUMMARY MEDICAL DECISION MAKING FREE TEXT BOX
ED workup unremarkable chest x-ray with slight increased opacity on right side, troponin and D-dimer negative, will DC home with oral antibiotics for possible bronchitis/pneumonia follow-up with PMD/pulmonary as outpatient 1 to 2 weeks, strict return precautions

## 2024-02-20 NOTE — ED PROVIDER NOTE - CARE PROVIDER_API CALL
Isaiah Pollard E  Pulmonary Disease  [address] [place] [id number]  Phone: [phone]  Fax: [phone]  Follow Up Time: 7-10 Days

## 2024-02-20 NOTE — ED PROVIDER NOTE - OBJECTIVE STATEMENT
39-year-old female with no significant past medical history presents to the ED for evaluation of midsternal chest pain radiating to the back that is worse with inspiration.  Patient reports nasal congestion.  Patient denies fever, chills, sore throat, recent travel, recent sick contacts, abdominal pain, nausea, vomiting, diarrhea, constipation, leg pain, leg swelling, recent surgeries, recent hospitalizations, history of cancer, use of hormones, history of blood clots, family history of blood clots or clotting disorders, cigarette smoking, or illicit drug use.

## 2024-02-20 NOTE — ED PROVIDER NOTE - ATTENDING APP SHARED VISIT CONTRIBUTION OF CARE
39-year-old female no past medical history presents with right-sided pleuritic chest pain since yesterday.  Patient states she has had flulike illness with congestion for the last week and since yesterday has had a pain on her right side only when she takes a deep breath.  No fever cough.  No leg pain swelling immobilization hormones hemoptysis.  No body aches chills sore throat or ear pain.  No family history.  Non-smoker.  Patient concerned about her lungs because she states she went outside in the cold on Sunday with wet hair.    On exam, AFVSS, Well appearing, No acute distress, NCAT, EOMI, PERRLA, MMM, Neck supple, LCTAB, RRR nl s1s2 No mrg, Abdomen Soft NTND, AAOx3, No Focal Deficits, No LE edema or calf TTP,    A/P; pleuritic right-sided chest pain, URI symptoms, will do labs chest x-ray EKG D-dimer reeval

## 2024-02-20 NOTE — ED ADULT TRIAGE NOTE - HEIGHT IN FEET
5 [employer] Pregnancy And Lactation Text: This medication is Pregnancy Category X and contraindicated in pregnancy and in women who may become pregnant. It is unknown if this medication is excreted in breast milk.

## 2024-02-23 ENCOUNTER — APPOINTMENT (OUTPATIENT)
Dept: PULMONOLOGY | Facility: CLINIC | Age: 39
End: 2024-02-23
Payer: MEDICAID

## 2024-02-23 ENCOUNTER — OUTPATIENT (OUTPATIENT)
Dept: OUTPATIENT SERVICES | Facility: HOSPITAL | Age: 39
LOS: 1 days | End: 2024-02-23
Payer: MEDICAID

## 2024-02-23 VITALS
WEIGHT: 183 LBS | HEIGHT: 66 IN | TEMPERATURE: 97.1 F | DIASTOLIC BLOOD PRESSURE: 70 MMHG | SYSTOLIC BLOOD PRESSURE: 101 MMHG | HEART RATE: 76 BPM | OXYGEN SATURATION: 99 % | BODY MASS INDEX: 29.41 KG/M2

## 2024-02-23 DIAGNOSIS — J18.9 PNEUMONIA, UNSPECIFIED ORGANISM: ICD-10-CM

## 2024-02-23 DIAGNOSIS — Z00.00 ENCOUNTER FOR GENERAL ADULT MEDICAL EXAMINATION WITHOUT ABNORMAL FINDINGS: ICD-10-CM

## 2024-02-23 DIAGNOSIS — Z85.07 PERSONAL HISTORY OF MALIGNANT NEOPLASM OF PANCREAS: Chronic | ICD-10-CM

## 2024-02-23 PROCEDURE — 99203 OFFICE O/P NEW LOW 30 MIN: CPT

## 2024-02-23 NOTE — END OF VISIT
[] : Resident [FreeTextEntry3] : CXR reviewed  No infiltrates  Finish abx course  CXR in 4 weeks  No symptoms  1 month follow up

## 2024-02-23 NOTE — ASSESSMENT
[FreeTextEntry1] : #Pneumonia, resolving -finish antibiotic course  -primary care f/u repeat cxr in 4weeks   1 month f/u

## 2024-03-05 DIAGNOSIS — J18.9 PNEUMONIA, UNSPECIFIED ORGANISM: ICD-10-CM

## 2024-03-15 ENCOUNTER — APPOINTMENT (OUTPATIENT)
Dept: NEUROLOGY | Facility: CLINIC | Age: 39
End: 2024-03-15

## 2024-06-04 ENCOUNTER — NON-APPOINTMENT (OUTPATIENT)
Age: 39
End: 2024-06-04

## 2024-06-04 NOTE — DISCHARGE NOTE ADULT - MODE OF TRANSPORTATION

## 2024-07-12 ENCOUNTER — APPOINTMENT (OUTPATIENT)
Dept: OPHTHALMOLOGY | Facility: CLINIC | Age: 39
End: 2024-07-12

## 2024-08-08 ENCOUNTER — NON-APPOINTMENT (OUTPATIENT)
Age: 39
End: 2024-08-08

## 2024-08-23 ENCOUNTER — APPOINTMENT (OUTPATIENT)
Dept: PULMONOLOGY | Facility: CLINIC | Age: 39
End: 2024-08-23

## 2024-08-29 ENCOUNTER — OUTPATIENT (OUTPATIENT)
Dept: OUTPATIENT SERVICES | Facility: HOSPITAL | Age: 39
LOS: 1 days | End: 2024-08-29
Payer: MEDICAID

## 2024-08-29 ENCOUNTER — LABORATORY RESULT (OUTPATIENT)
Age: 39
End: 2024-08-29

## 2024-08-29 ENCOUNTER — APPOINTMENT (OUTPATIENT)
Age: 39
End: 2024-08-29
Payer: MEDICAID

## 2024-08-29 VITALS
WEIGHT: 182 LBS | HEIGHT: 66 IN | BODY MASS INDEX: 29.25 KG/M2 | OXYGEN SATURATION: 99 % | SYSTOLIC BLOOD PRESSURE: 111 MMHG | RESPIRATION RATE: 16 BRPM | HEART RATE: 57 BPM | TEMPERATURE: 98.2 F | DIASTOLIC BLOOD PRESSURE: 75 MMHG

## 2024-08-29 DIAGNOSIS — Z85.07 PERSONAL HISTORY OF MALIGNANT NEOPLASM OF PANCREAS: Chronic | ICD-10-CM

## 2024-08-29 DIAGNOSIS — D64.9 ANEMIA, UNSPECIFIED: ICD-10-CM

## 2024-08-29 DIAGNOSIS — E61.1 IRON DEFICIENCY: ICD-10-CM

## 2024-08-29 DIAGNOSIS — K86.89 OTHER SPECIFIED DISEASES OF PANCREAS: ICD-10-CM

## 2024-08-29 LAB
ALBUMIN SERPL ELPH-MCNC: 4.9 G/DL
ALP BLD-CCNC: 109 U/L
ALT SERPL-CCNC: 66 U/L
ANION GAP SERPL CALC-SCNC: 12 MMOL/L
AST SERPL-CCNC: 45 U/L
BILIRUB SERPL-MCNC: 0.7 MG/DL
BUN SERPL-MCNC: 10 MG/DL
CALCIUM SERPL-MCNC: 9.5 MG/DL
CHLORIDE SERPL-SCNC: 100 MMOL/L
CO2 SERPL-SCNC: 22 MMOL/L
CREAT SERPL-MCNC: 0.5 MG/DL
EGFR: 123 ML/MIN/1.73M2
GLUCOSE SERPL-MCNC: 86 MG/DL
HCT VFR BLD CALC: 38.4 %
HGB BLD-MCNC: 13.2 G/DL
IRON SATN MFR SERPL: 40 %
IRON SERPL-MCNC: 147 UG/DL
MCHC RBC-ENTMCNC: 29.6 PG
MCHC RBC-ENTMCNC: 34.4 G/DL
MCV RBC AUTO: 86.1 FL
PLATELET # BLD AUTO: 311 K/UL
PMV BLD: 10.3 FL
POTASSIUM SERPL-SCNC: 4.7 MMOL/L
PROT SERPL-MCNC: 7.7 G/DL
RBC # BLD: 4.46 M/UL
RBC # FLD: 12.6 %
SODIUM SERPL-SCNC: 134 MMOL/L
TIBC SERPL-MCNC: 366 UG/DL
UIBC SERPL-MCNC: 219 UG/DL
WBC # FLD AUTO: 6.86 K/UL

## 2024-08-29 PROCEDURE — 80053 COMPREHEN METABOLIC PANEL: CPT

## 2024-08-29 PROCEDURE — 99213 OFFICE O/P EST LOW 20 MIN: CPT

## 2024-08-29 PROCEDURE — 85027 COMPLETE CBC AUTOMATED: CPT

## 2024-08-29 PROCEDURE — 83550 IRON BINDING TEST: CPT

## 2024-08-29 PROCEDURE — 83540 ASSAY OF IRON: CPT

## 2024-08-29 PROCEDURE — 82728 ASSAY OF FERRITIN: CPT

## 2024-08-29 NOTE — ASSESSMENT
[FreeTextEntry1] : 39 year old female   #History of pancreatic cancer, s/p Whipple >6 years ago - Last CT (April 2023): TARYN  #Iron deficiency anemia, secondary to heavy menses  - Intolerant to PO iron, responsive to IV  - S/P Venofer in November 2023  #Lower extremity pain ?venous insufficiency ?varicose veins  PLAN: --Will check CBC, CMP, ferritin today --Will arrange for additional IV iron if needed  --No additional CT scans at this time  --Continue with age-appropriate screenings --Follow up with vascular  RTC in 6 months   Patient was seen and examined and discussed with Dr. Murray who agrees to the above plan of care.

## 2024-08-29 NOTE — HISTORY OF PRESENT ILLNESS
[de-identified] : \par  32 yo female , Divehi speaking, born in [place] , interviewed with  # [id number]. Patient with  with no significant  significant medical history . S/p pancreaticoduodenectomy, pylorus-preserving R0 resection of  for pancreatic mass diagnosed when she presented with epigastric and periumbilical swelling for 3 months.  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  surgery complicated with a sepsis - possibly from aspiration pneumonia. \par  type A pancreatic fistula and early resolution of delayed gastric emptying. She continues to have drainage 10 to 20cc daily . She denies fever, nausea, vomiting , abdominal pain or diarrhea. \par  \par  \par  \par   [de-identified] : 06/14/2018 patient returns for follow up , she feels well , no abdominal pain or weight loss. no nausea or vomiting . Blood work was notable for mild normocytic anemia , Ferritin 38 . She denies hematochezia or heavy menses.   08/21/2018 : Patient returns for follow up , she is on iron one daily , Hb improved to 12. She complains of slight increase in epigastric discomfort , no nausea, vomiting , pruritis, change in bowel habits. no weight loss.   06/03/2019 Patient returns for history of pancreatic cancer s/p Whipple's with no evidence of disease. Iron deficiency anemia s/p venofer .She feels well and denies any GI symptoms . no weight loss, nausea vomiting . Recent blood work shows slightly elevated alkaline phosphatase .  01/27/2020 Patient returns with chief complaint of interscapular pain for 2 to 3 weeks , worse with activity and present at night . 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 She reports urinary frequency without dysuria , change in bowel habits , leg weakness or numbness.   02/10/2020 Patient returns for history of pancreatic cancer s/p Whipple's with no evidence of disease. Ferritin of 8 noted on 01/27/2020. On venofer. Her interscapular pain is better with cyclobenzaprine. X ray did not reveal any fractures. She complains of increased urinary frequency. No fevers.  Her menses are regular and normal.   6/11/2020: SHIELA VERMA is a 35 year old female here today for follow up visit for history of EDWIN and pancreatic cancer s/p Whipples. She feels well besdies mild fatigue. She denies any GI symptoms,  weight loss, nausea, vomiting, or hematochezia. she received Venofer 200mg IV x5 doses; last dose was Feb 20, 2020. She complains of urticaria around her areolas which started about a week ago. Last mammogram was May 2019 which showed no evidence for malignancy. CBC reviewed normocytic anemia noted. Will recheck iron studies and ferritin on 6/11/2020 9/17/2020: SHIELA VERMA is a 35 year old female here today for follow up visit for history of EDWIN and pancreatic cancer s/p Whipples. She feels well besides mild fatigue. She denies any GI symptoms,  weight loss, nausea, vomiting, or hematochezia. she received Venofer 200mg IV x5 doses; last dose was Feb 20, 2020. CT C/A/P was completed on 9/8/2020 CT A/P was negative for disease; post surgical changes from Whipple procedure. CT of chest showed :  IMPRESSION: Since January 23, 2018 Nonspecific 2 mm subpleural nodule in the right lower lobe which may represent a focus of scarring/atelectasis Scattered 2 mm nodules in the right upper lobe which may be postinflammatory in etiology. Patient denies cough at this time. She states that she does have mild SOB on exertion.   2/2/2021: Patient presents for follow up. Complains of difficulty breathing for several weeks. Upon further clarification, denies overt shortness of breath or cough. Has nasal congestion. Also complains of posterior neck and paraspinal upper back pain and sensation of warmth for about the same length of time. Denies nausea, vomiting, anorexia. She is asking to have her blood levels checked because of her history of iron deficiency (last received venofer in 2/2020). Her PMD checked labs in 12/2020, and she had hemoglobin of 12 with 16% saturation and ferritin of 25. Patient does not take oral iron.  05/04/2021: SHIELA is here for follow up visit for history of pancreatic cancer s/p Whipple procedure. In addition, she has EDWIN; not currently on iron supplement at this time. She denies weight loss, abdominal pain, increased fatigue, SOB, or hematochezia at this time. She does complain of increased nasal congestion. She was seen by ENT who started her on a course of doxycycline, steroids, and fluticasone. Posterior neck and paraspinal pain has improved. Bone scan was completed in March 2021 which was normal.   11/02/21 Pt returns for f/u today . She reports feeling fine . Pt was admitted to hospital with headache and dizziness . She had MRI that showed : IMPRESSION: 1. No MRI evidence of intracranial metastatic disease. No acute infarct or intracranial hemorrhage. 2. Few scattered small/punctate foci of white matter signal abnormality which are nonspecific in etiology. Pt was seen by neurology and was given sumtriptan for migraines . Pt reports that she takes it on and off and her headaches have resolved . She denies any fever , chills, weight loss , loss of appetite and abdominal pain . Her sister was recently diagnosed with uterine mass ? and is being worked up by Gyn . Pt also reports that her mother had uterine cancer 20 years ago , and recently diagnosed with bladder cancer and had surgery done . Pt reports that she was seen by Gyn last year and everything was fine .   4/26/22- Patient is here for follow up visit. She is doing well with no new c/o abdominal pain, weight loss, OLESYA. Her migraines are better as well. No other new issues   11/8/2022 Patient returns for follow up , she had negative biopsy of mammographically detected left breast lesion . She feels well and denies any abdominal pain , increased in girth , change in bowel habits.   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 Blood work was normal including Hgb . she is scheduled to see neurology . she had similar episode in 2021.   10/10/2023 Patient returns for follow up , last CT scan showed no evidence of disease . Since her last visit she had another episode of pressure in the back of the head lasting for few minutes , no associated visual symptoms this time . She was previously suspected with migraine . MRI of head is negative and is awaiting MRA .   2/15/2024: Patient returns for follow up for iron deficiency secondary to heavy menses and a history of pancreatic cancer (less than 5 years ago s/p Whipple). She feels overall well today, no new complaints at this time. Her last CT scans were negative for disease. She was seen by neurology in December for her blurry visions, MRA unremarkable. She has not had any more episodes of blurry vision since.   8/29/24:  Patient returns for follow up for EDWIN secondary to heavy menses, as well as pancreatic cancer, s/p whipple more than 6 years ago. She feels well, denies new abdominal pain, nausea, vomiting or weight loss. Her only complaint is pressure/pain in B/L lower extremities. She was evaluated in clinic, had US done, was told she has some degree if venous insufficiency and was recommended injections?

## 2024-08-29 NOTE — PHYSICAL EXAM
[Normal] : affect appropriate [de-identified] : healed scar of surgery , no tenderness or redness. no palpable masses.

## 2024-08-29 NOTE — HISTORY OF PRESENT ILLNESS
[de-identified] : \par  32 yo female , Nepali speaking, born in [place] , interviewed with  # [id number]. Patient with  with no significant  significant medical history . S/p pancreaticoduodenectomy, pylorus-preserving R0 resection of  for pancreatic mass diagnosed when she presented with epigastric and periumbilical swelling for 3 months.  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  surgery complicated with a sepsis - possibly from aspiration pneumonia. \par  type A pancreatic fistula and early resolution of delayed gastric emptying. She continues to have drainage 10 to 20cc daily . She denies fever, nausea, vomiting , abdominal pain or diarrhea. \par  \par  \par  \par   [de-identified] : 06/14/2018 patient returns for follow up , she feels well , no abdominal pain or weight loss. no nausea or vomiting . Blood work was notable for mild normocytic anemia , Ferritin 38 . She denies hematochezia or heavy menses.   08/21/2018 : Patient returns for follow up , she is on iron one daily , Hb improved to 12. She complains of slight increase in epigastric discomfort , no nausea, vomiting , pruritis, change in bowel habits. no weight loss.   06/03/2019 Patient returns for history of pancreatic cancer s/p Whipple's with no evidence of disease. Iron deficiency anemia s/p venofer .She feels well and denies any GI symptoms . no weight loss, nausea vomiting . Recent blood work shows slightly elevated alkaline phosphatase .  01/27/2020 Patient returns with chief complaint of interscapular pain for 2 to 3 weeks , worse with activity and present at night . 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 She reports urinary frequency without dysuria , change in bowel habits , leg weakness or numbness.   02/10/2020 Patient returns for history of pancreatic cancer s/p Whipple's with no evidence of disease. Ferritin of 8 noted on 01/27/2020. On venofer. Her interscapular pain is better with cyclobenzaprine. X ray did not reveal any fractures. She complains of increased urinary frequency. No fevers.  Her menses are regular and normal.   6/11/2020: SHIELA VERMA is a 35 year old female here today for follow up visit for history of EDWIN and pancreatic cancer s/p Whipples. She feels well besdies mild fatigue. She denies any GI symptoms,  weight loss, nausea, vomiting, or hematochezia. she received Venofer 200mg IV x5 doses; last dose was Feb 20, 2020. She complains of urticaria around her areolas which started about a week ago. Last mammogram was May 2019 which showed no evidence for malignancy. CBC reviewed normocytic anemia noted. Will recheck iron studies and ferritin on 6/11/2020 9/17/2020: SHIELA VERMA is a 35 year old female here today for follow up visit for history of EDWIN and pancreatic cancer s/p Whipples. She feels well besides mild fatigue. She denies any GI symptoms,  weight loss, nausea, vomiting, or hematochezia. she received Venofer 200mg IV x5 doses; last dose was Feb 20, 2020. CT C/A/P was completed on 9/8/2020 CT A/P was negative for disease; post surgical changes from Whipple procedure. CT of chest showed :  IMPRESSION: Since January 23, 2018 Nonspecific 2 mm subpleural nodule in the right lower lobe which may represent a focus of scarring/atelectasis Scattered 2 mm nodules in the right upper lobe which may be postinflammatory in etiology. Patient denies cough at this time. She states that she does have mild SOB on exertion.   2/2/2021: Patient presents for follow up. Complains of difficulty breathing for several weeks. Upon further clarification, denies overt shortness of breath or cough. Has nasal congestion. Also complains of posterior neck and paraspinal upper back pain and sensation of warmth for about the same length of time. Denies nausea, vomiting, anorexia. She is asking to have her blood levels checked because of her history of iron deficiency (last received venofer in 2/2020). Her PMD checked labs in 12/2020, and she had hemoglobin of 12 with 16% saturation and ferritin of 25. Patient does not take oral iron.  05/04/2021: SHIELA is here for follow up visit for history of pancreatic cancer s/p Whipple procedure. In addition, she has EDWIN; not currently on iron supplement at this time. She denies weight loss, abdominal pain, increased fatigue, SOB, or hematochezia at this time. She does complain of increased nasal congestion. She was seen by ENT who started her on a course of doxycycline, steroids, and fluticasone. Posterior neck and paraspinal pain has improved. Bone scan was completed in March 2021 which was normal.   11/02/21 Pt returns for f/u today . She reports feeling fine . Pt was admitted to hospital with headache and dizziness . She had MRI that showed : IMPRESSION: 1. No MRI evidence of intracranial metastatic disease. No acute infarct or intracranial hemorrhage. 2. Few scattered small/punctate foci of white matter signal abnormality which are nonspecific in etiology. Pt was seen by neurology and was given sumtriptan for migraines . Pt reports that she takes it on and off and her headaches have resolved . She denies any fever , chills, weight loss , loss of appetite and abdominal pain . Her sister was recently diagnosed with uterine mass ? and is being worked up by Gyn . Pt also reports that her mother had uterine cancer 20 years ago , and recently diagnosed with bladder cancer and had surgery done . Pt reports that she was seen by Gyn last year and everything was fine .   4/26/22- Patient is here for follow up visit. She is doing well with no new c/o abdominal pain, weight loss, OLESYA. Her migraines are better as well. No other new issues   11/8/2022 Patient returns for follow up , she had negative biopsy of mammographically detected left breast lesion . She feels well and denies any abdominal pain , increased in girth , change in bowel habits.   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 Blood work was normal including Hgb . she is scheduled to see neurology . she had similar episode in 2021.   10/10/2023 Patient returns for follow up , last CT scan showed no evidence of disease . Since her last visit she had another episode of pressure in the back of the head lasting for few minutes , no associated visual symptoms this time . She was previously suspected with migraine . MRI of head is negative and is awaiting MRA .   2/15/2024: Patient returns for follow up for iron deficiency secondary to heavy menses and a history of pancreatic cancer (less than 5 years ago s/p Whipple). She feels overall well today, no new complaints at this time. Her last CT scans were negative for disease. She was seen by neurology in December for her blurry visions, MRA unremarkable. She has not had any more episodes of blurry vision since.   8/29/24:  Patient returns for follow up for EDWIN secondary to heavy menses, as well as pancreatic cancer, s/p whipple more than 6 years ago. She feels well, denies new abdominal pain, nausea, vomiting or weight loss. Her only complaint is pressure/pain in B/L lower extremities. She was evaluated in clinic, had US done, was told she has some degree if venous insufficiency and was recommended injections?

## 2024-08-29 NOTE — PHYSICAL EXAM
[Normal] : affect appropriate [de-identified] : healed scar of surgery , no tenderness or redness. no palpable masses.

## 2024-08-30 DIAGNOSIS — D64.9 ANEMIA, UNSPECIFIED: ICD-10-CM

## 2024-08-30 LAB — FERRITIN SERPL-MCNC: 85 NG/ML

## 2024-09-12 NOTE — PATIENT PROFILE ADULT. - PRO ARRIVE FROM
Please call your physician or go to the emergency department if any of the following occur:   Fever, unrelieved pain, intractable vomiting, inability to urinate  Please follow-up in 3 week for pathology discussion and possible stent removal with Dr. Issa Boyd. Call [phone] for appointment.  Patient may not drive while on narcotic pain medications.  Patient may shower.   Patient may resume a regular diet.     
home

## 2024-09-16 ENCOUNTER — APPOINTMENT (OUTPATIENT)
Dept: OBGYN | Facility: CLINIC | Age: 39
End: 2024-09-16
Payer: MEDICAID

## 2024-09-16 ENCOUNTER — OUTPATIENT (OUTPATIENT)
Dept: OUTPATIENT SERVICES | Facility: HOSPITAL | Age: 39
LOS: 1 days | End: 2024-09-16
Payer: MEDICAID

## 2024-09-16 ENCOUNTER — LABORATORY RESULT (OUTPATIENT)
Age: 39
End: 2024-09-16

## 2024-09-16 VITALS
BODY MASS INDEX: 28.45 KG/M2 | HEART RATE: 55 BPM | SYSTOLIC BLOOD PRESSURE: 103 MMHG | DIASTOLIC BLOOD PRESSURE: 70 MMHG | HEIGHT: 66 IN | WEIGHT: 177 LBS

## 2024-09-16 DIAGNOSIS — Z01.419 ENCOUNTER FOR GYNECOLOGICAL EXAMINATION (GENERAL) (ROUTINE) WITHOUT ABNORMAL FINDINGS: ICD-10-CM

## 2024-09-16 DIAGNOSIS — Z01.419 ENCOUNTER FOR GYNECOLOGICAL EXAMINATION (GENERAL) (ROUTINE) W/OUT ABNORMAL FINDINGS: ICD-10-CM

## 2024-09-16 DIAGNOSIS — Z85.07 PERSONAL HISTORY OF MALIGNANT NEOPLASM OF PANCREAS: Chronic | ICD-10-CM

## 2024-09-16 PROCEDURE — 88142 CYTOPATH C/V THIN LAYER: CPT

## 2024-09-16 PROCEDURE — 87661 TRICHOMONAS VAGINALIS AMPLIF: CPT

## 2024-09-16 PROCEDURE — 99395 PREV VISIT EST AGE 18-39: CPT

## 2024-09-16 PROCEDURE — 87591 N.GONORRHOEAE DNA AMP PROB: CPT

## 2024-09-16 PROCEDURE — 87491 CHLMYD TRACH DNA AMP PROBE: CPT

## 2024-09-16 PROCEDURE — 81513 NFCT DS BV RNA VAG FLU ALG: CPT

## 2024-09-16 PROCEDURE — 87624 HPV HI-RISK TYP POOLED RSLT: CPT

## 2024-09-16 PROCEDURE — 87481 CANDIDA DNA AMP PROBE: CPT

## 2024-09-16 PROCEDURE — T1013: CPT

## 2024-09-16 NOTE — PLAN
[FreeTextEntry1] : Annual Exam Possible UTI vs Candida Vaginitis/Vulvitis. Currently asymptomatic. Pap/hpv done [facility] C&S Vaginitis panel Will call if anything abnormal.

## 2024-09-16 NOTE — PHYSICAL EXAM
[Chaperone Present] : A chaperone was present in the examining room during all aspects of the physical examination [[id number]] : A chaperone was present during the pelvic exam. [Appropriately responsive] : appropriately responsive [Alert] : alert [No Acute Distress] : no acute distress [Soft] : soft [Non-tender] : non-tender [Non-distended] : non-distended [No HSM] : No HSM [No Lesions] : no lesions [No Mass] : no mass [Oriented x3] : oriented x3 [Examination Of The Breasts] : a normal appearance [No Masses] : no breast masses were palpable [Labia Majora] : normal [Labia Minora] : normal [Scant] : There was scant vaginal bleeding [Normal] : normal [Uterine Adnexae] : normal [FreeTextEntry1] : Mild external funal infection on the labia majora.

## 2024-09-16 NOTE — HISTORY OF PRESENT ILLNESS
[FreeTextEntry1] : Patient here annual gynecological examination. She reported having a possible urinary infection around three weeks ago, for which she visited a small clinic and was prescribed antibiotics for a week. However, the clinic later informed her that there was no infection but rather a fungal issue, and they provided her with a treatment. She was given diflucan but hasn't taken it yet. She currently has no symtpoms. She is at the end of her period.  - Past Gynecologic History : Last pap smear in 2022, which was normal. She is not sexually active. 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

## 2024-09-17 ENCOUNTER — OUTPATIENT (OUTPATIENT)
Dept: OUTPATIENT SERVICES | Facility: HOSPITAL | Age: 39
LOS: 1 days | End: 2024-09-17

## 2024-09-17 DIAGNOSIS — Z85.07 PERSONAL HISTORY OF MALIGNANT NEOPLASM OF PANCREAS: Chronic | ICD-10-CM

## 2024-09-17 DIAGNOSIS — Z01.419 ENCOUNTER FOR GYNECOLOGICAL EXAMINATION (GENERAL) (ROUTINE) WITHOUT ABNORMAL FINDINGS: ICD-10-CM

## 2024-09-18 DIAGNOSIS — Z01.419 ENCOUNTER FOR GYNECOLOGICAL EXAMINATION (GENERAL) (ROUTINE) WITHOUT ABNORMAL FINDINGS: ICD-10-CM

## 2024-09-19 LAB
BV BACTERIA RRNA VAG QL NAA+PROBE: NOT DETECTED
C GLABRATA RNA VAG QL NAA+PROBE: NOT DETECTED
C TRACH RRNA SPEC QL NAA+PROBE: NOT DETECTED
CANDIDA RRNA VAG QL PROBE: NOT DETECTED
N GONORRHOEA RRNA SPEC QL NAA+PROBE: NOT DETECTED
T VAGINALIS RRNA SPEC QL NAA+PROBE: NOT DETECTED

## 2024-09-26 LAB — CYTOLOGY CVX/VAG DOC THIN PREP: NORMAL

## 2024-10-30 ENCOUNTER — APPOINTMENT (OUTPATIENT)
Dept: OPHTHALMOLOGY | Facility: CLINIC | Age: 39
End: 2024-10-30

## 2024-12-23 ENCOUNTER — NON-APPOINTMENT (OUTPATIENT)
Age: 39
End: 2024-12-23

## 2025-01-24 ENCOUNTER — APPOINTMENT (OUTPATIENT)
Dept: OPHTHALMOLOGY | Facility: CLINIC | Age: 40
End: 2025-01-24
Payer: MEDICAID

## 2025-01-24 ENCOUNTER — OUTPATIENT (OUTPATIENT)
Dept: OUTPATIENT SERVICES | Facility: HOSPITAL | Age: 40
LOS: 1 days | End: 2025-01-24
Payer: MEDICAID

## 2025-01-24 DIAGNOSIS — H53.8 OTHER VISUAL DISTURBANCES: ICD-10-CM

## 2025-01-24 DIAGNOSIS — Z85.07 PERSONAL HISTORY OF MALIGNANT NEOPLASM OF PANCREAS: Chronic | ICD-10-CM

## 2025-01-24 PROCEDURE — 92012 INTRM OPH EXAM EST PATIENT: CPT

## 2025-02-28 ENCOUNTER — APPOINTMENT (OUTPATIENT)
Dept: VASCULAR SURGERY | Facility: CLINIC | Age: 40
End: 2025-02-28
Payer: MEDICAID

## 2025-02-28 ENCOUNTER — APPOINTMENT (OUTPATIENT)
Dept: OPHTHALMOLOGY | Facility: CLINIC | Age: 40
End: 2025-02-28

## 2025-02-28 VITALS
HEIGHT: 65 IN | BODY MASS INDEX: 30.85 KG/M2 | SYSTOLIC BLOOD PRESSURE: 127 MMHG | WEIGHT: 185.19 LBS | DIASTOLIC BLOOD PRESSURE: 71 MMHG

## 2025-02-28 DIAGNOSIS — I83.893 VARICOSE VEINS OF BILATERAL LOWER EXTREMITIES WITH OTHER COMPLICATIONS: ICD-10-CM

## 2025-02-28 PROCEDURE — 93970 EXTREMITY STUDY: CPT

## 2025-02-28 PROCEDURE — 99203 OFFICE O/P NEW LOW 30 MIN: CPT

## 2025-03-02 ENCOUNTER — NON-APPOINTMENT (OUTPATIENT)
Age: 40
End: 2025-03-02

## 2025-03-14 ENCOUNTER — OUTPATIENT (OUTPATIENT)
Dept: OUTPATIENT SERVICES | Facility: HOSPITAL | Age: 40
LOS: 1 days | End: 2025-03-14
Payer: MEDICAID

## 2025-03-14 ENCOUNTER — APPOINTMENT (OUTPATIENT)
Age: 40
End: 2025-03-14
Payer: MEDICAID

## 2025-03-14 VITALS
HEART RATE: 62 BPM | WEIGHT: 187 LBS | BODY MASS INDEX: 31.16 KG/M2 | TEMPERATURE: 98.1 F | DIASTOLIC BLOOD PRESSURE: 69 MMHG | SYSTOLIC BLOOD PRESSURE: 102 MMHG | OXYGEN SATURATION: 100 % | HEIGHT: 65 IN

## 2025-03-14 DIAGNOSIS — D64.9 ANEMIA, UNSPECIFIED: ICD-10-CM

## 2025-03-14 DIAGNOSIS — E61.1 IRON DEFICIENCY: ICD-10-CM

## 2025-03-14 DIAGNOSIS — Z79.899 OTHER LONG TERM (CURRENT) DRUG THERAPY: ICD-10-CM

## 2025-03-14 DIAGNOSIS — Z85.07 PERSONAL HISTORY OF MALIGNANT NEOPLASM OF PANCREAS: Chronic | ICD-10-CM

## 2025-03-14 PROCEDURE — 82728 ASSAY OF FERRITIN: CPT

## 2025-03-14 PROCEDURE — 80053 COMPREHEN METABOLIC PANEL: CPT

## 2025-03-14 PROCEDURE — 85025 COMPLETE CBC W/AUTO DIFF WBC: CPT

## 2025-03-14 PROCEDURE — 99214 OFFICE O/P EST MOD 30 MIN: CPT

## 2025-03-14 PROCEDURE — 83540 ASSAY OF IRON: CPT

## 2025-03-14 PROCEDURE — 83550 IRON BINDING TEST: CPT

## 2025-03-15 DIAGNOSIS — D64.9 ANEMIA, UNSPECIFIED: ICD-10-CM

## 2025-03-17 PROBLEM — Z79.899 ENCOUNTER FOR MEDICATION MANAGEMENT: Status: ACTIVE | Noted: 2025-03-17

## 2025-03-17 LAB
ALBUMIN SERPL ELPH-MCNC: 4.3 G/DL
ALP BLD-CCNC: 123 U/L
ALT SERPL-CCNC: 34 U/L
ANION GAP SERPL CALC-SCNC: 8 MMOL/L
AST SERPL-CCNC: 29 U/L
AUTO BASOPHILS #: 0.02 K/UL
AUTO BASOPHILS %: 0.3 %
AUTO EOSINOPHILS #: 0.22 K/UL
AUTO EOSINOPHILS %: 3.2 %
AUTO IMMATURE GRANULOCYTES #: 0.02 K/UL
AUTO LYMPHOCYTES #: 2.64 K/UL
AUTO LYMPHOCYTES %: 38.4 %
AUTO MONOCYTES #: 0.5 K/UL
AUTO MONOCYTES %: 7.3 %
AUTO NEUTROPHILS #: 3.47 K/UL
AUTO NEUTROPHILS %: 50.5 %
AUTO NRBC #: 0 K/UL
BILIRUB SERPL-MCNC: 0.3 MG/DL
BUN SERPL-MCNC: 11 MG/DL
CALCIUM SERPL-MCNC: 8.6 MG/DL
CHLORIDE SERPL-SCNC: 107 MMOL/L
CO2 SERPL-SCNC: 23 MMOL/L
CREAT SERPL-MCNC: 0.6 MG/DL
EGFRCR SERPLBLD CKD-EPI 2021: 117 ML/MIN/1.73M2
FERRITIN SERPL-MCNC: 21 NG/ML
GLUCOSE SERPL-MCNC: 87 MG/DL
HCT VFR BLD CALC: 36.1 %
HGB BLD-MCNC: 12.3 G/DL
IMM GRANULOCYTES NFR BLD AUTO: 0.3 %
IRON SATN MFR SERPL: 11 %
IRON SERPL-MCNC: 46 UG/DL
MAN DIFF?: NORMAL
MCHC RBC-ENTMCNC: 28.9 PG
MCHC RBC-ENTMCNC: 34.1 G/DL
MCV RBC AUTO: 84.9 FL
PLATELET # BLD AUTO: 308 K/UL
PMV BLD AUTO: 0 /100 WBCS
PMV BLD: 10.2 FL
POTASSIUM SERPL-SCNC: 4.4 MMOL/L
PROT SERPL-MCNC: 7.3 G/DL
RBC # BLD: 4.25 M/UL
RBC # FLD: 13.2 %
SODIUM SERPL-SCNC: 138 MMOL/L
TIBC SERPL-MCNC: 421 UG/DL
UIBC SERPL-MCNC: 375 UG/DL
WBC # FLD AUTO: 6.87 K/UL

## 2025-03-25 ENCOUNTER — OUTPATIENT (OUTPATIENT)
Dept: OUTPATIENT SERVICES | Facility: HOSPITAL | Age: 40
LOS: 1 days | End: 2025-03-25
Payer: MEDICAID

## 2025-03-25 ENCOUNTER — APPOINTMENT (OUTPATIENT)
Age: 40
End: 2025-03-25

## 2025-03-25 VITALS
TEMPERATURE: 98.9 F | HEART RATE: 52 BPM | DIASTOLIC BLOOD PRESSURE: 68 MMHG | SYSTOLIC BLOOD PRESSURE: 114 MMHG | RESPIRATION RATE: 16 BRPM

## 2025-03-25 DIAGNOSIS — D64.9 ANEMIA, UNSPECIFIED: ICD-10-CM

## 2025-03-25 DIAGNOSIS — Z85.07 PERSONAL HISTORY OF MALIGNANT NEOPLASM OF PANCREAS: Chronic | ICD-10-CM

## 2025-03-25 PROCEDURE — 96365 THER/PROPH/DIAG IV INF INIT: CPT

## 2025-03-25 RX ORDER — IRON SUCROSE 20 MG/ML
200 INJECTION, SOLUTION INTRAVENOUS ONCE
Refills: 0 | Status: COMPLETED | OUTPATIENT
Start: 2025-03-25 | End: 2025-03-25

## 2025-03-25 RX ADMIN — IRON SUCROSE 500 MILLIGRAM(S): 20 INJECTION, SOLUTION INTRAVENOUS at 17:22

## 2025-03-25 RX ADMIN — IRON SUCROSE 200 MILLIGRAM(S): 20 INJECTION, SOLUTION INTRAVENOUS at 17:53

## 2025-04-01 ENCOUNTER — APPOINTMENT (OUTPATIENT)
Age: 40
End: 2025-04-01

## 2025-04-01 ENCOUNTER — OUTPATIENT (OUTPATIENT)
Dept: OUTPATIENT SERVICES | Facility: HOSPITAL | Age: 40
LOS: 1 days | End: 2025-04-01
Payer: MEDICAID

## 2025-04-01 DIAGNOSIS — D64.9 ANEMIA, UNSPECIFIED: ICD-10-CM

## 2025-04-01 DIAGNOSIS — Z85.07 PERSONAL HISTORY OF MALIGNANT NEOPLASM OF PANCREAS: Chronic | ICD-10-CM

## 2025-04-01 PROCEDURE — 96365 THER/PROPH/DIAG IV INF INIT: CPT

## 2025-04-01 RX ORDER — IRON SUCROSE 20 MG/ML
200 INJECTION, SOLUTION INTRAVENOUS ONCE
Refills: 0 | Status: COMPLETED | OUTPATIENT
Start: 2025-04-01 | End: 2025-04-01

## 2025-04-01 RX ADMIN — IRON SUCROSE 200 MILLIGRAM(S): 20 INJECTION, SOLUTION INTRAVENOUS at 17:04

## 2025-04-02 DIAGNOSIS — D64.9 ANEMIA, UNSPECIFIED: ICD-10-CM

## 2025-04-08 ENCOUNTER — OUTPATIENT (OUTPATIENT)
Dept: OUTPATIENT SERVICES | Facility: HOSPITAL | Age: 40
LOS: 1 days | End: 2025-04-08
Payer: MEDICAID

## 2025-04-08 ENCOUNTER — APPOINTMENT (OUTPATIENT)
Age: 40
End: 2025-04-08

## 2025-04-08 DIAGNOSIS — D64.9 ANEMIA, UNSPECIFIED: ICD-10-CM

## 2025-04-08 DIAGNOSIS — Z85.07 PERSONAL HISTORY OF MALIGNANT NEOPLASM OF PANCREAS: Chronic | ICD-10-CM

## 2025-04-08 PROCEDURE — 96365 THER/PROPH/DIAG IV INF INIT: CPT

## 2025-04-08 RX ORDER — IRON SUCROSE 20 MG/ML
200 INJECTION, SOLUTION INTRAVENOUS ONCE
Refills: 0 | Status: COMPLETED | OUTPATIENT
Start: 2025-04-08 | End: 2025-04-08

## 2025-04-08 RX ADMIN — IRON SUCROSE 200 MILLIGRAM(S): 20 INJECTION, SOLUTION INTRAVENOUS at 16:33

## 2025-04-08 RX ADMIN — IRON SUCROSE 200 MILLIGRAM(S): 20 INJECTION, SOLUTION INTRAVENOUS at 17:03

## 2025-04-10 NOTE — ED ADULT TRIAGE NOTE - AS TEMP SITE
oral How Severe Are Your Spot(S)?: moderate What Type Of Note Output Would You Prefer (Optional)?: Standard Output What Is The Reason For Today's Visit?: Annual Full Body Skin Examination with No Concerns What Is The Reason For Today's Visit? (Being Monitored For X): the development of new lesions

## 2025-07-03 ENCOUNTER — NON-APPOINTMENT (OUTPATIENT)
Age: 40
End: 2025-07-03

## 2025-09-12 ENCOUNTER — APPOINTMENT (OUTPATIENT)
Age: 40
End: 2025-09-12

## 2025-09-19 ENCOUNTER — APPOINTMENT (OUTPATIENT)
Age: 40
End: 2025-09-19